# Patient Record
Sex: FEMALE | Race: WHITE | Employment: OTHER | URBAN - METROPOLITAN AREA
[De-identification: names, ages, dates, MRNs, and addresses within clinical notes are randomized per-mention and may not be internally consistent; named-entity substitution may affect disease eponyms.]

---

## 2018-05-08 ENCOUNTER — DOCTOR'S OFFICE (OUTPATIENT)
Dept: URBAN - METROPOLITAN AREA CLINIC 136 | Facility: CLINIC | Age: 78
Setting detail: OPHTHALMOLOGY
End: 2018-05-08
Payer: MEDICARE

## 2018-05-08 ENCOUNTER — RX ONLY (RX ONLY)
Age: 78
End: 2018-05-08

## 2018-05-08 DIAGNOSIS — H43.811: ICD-10-CM

## 2018-05-08 DIAGNOSIS — H35.363: ICD-10-CM

## 2018-05-08 DIAGNOSIS — H00.11: ICD-10-CM

## 2018-05-08 DIAGNOSIS — H25.13: ICD-10-CM

## 2018-05-08 PROCEDURE — 92004 COMPRE OPH EXAM NEW PT 1/>: CPT | Performed by: OPHTHALMOLOGY

## 2018-05-08 PROCEDURE — 92134 CPTRZ OPH DX IMG PST SGM RTA: CPT | Performed by: OPHTHALMOLOGY

## 2018-05-08 ASSESSMENT — CONFRONTATIONAL VISUAL FIELD TEST (CVF)
OS_FINDINGS: FULL
OD_FINDINGS: FULL

## 2018-05-08 ASSESSMENT — LID EXAM ASSESSMENTS
OS_COMMENTS: MGD
OD_COMMENTS: MGD

## 2018-05-09 ASSESSMENT — REFRACTION_MANIFEST
OD_VA2: 20/
OU_VA: 20/
OS_VA1: 20/
OS_VA2: 20/
OU_VA: 20/
OD_VA2: 20/
OD_VA1: 20/
OS_VA1: 20/
OD_VA3: 20/
OS_VA3: 20/
OD_VA1: 20/
OD_VA1: 20/
OS_VA3: 20/
OS_VA2: 20/
OD_VA3: 20/
OS_VA1: 20/
OD_VA2: 20/
OD_VA3: 20/
OS_VA3: 20/
OS_VA2: 20/
OU_VA: 20/

## 2018-05-09 ASSESSMENT — REFRACTION_CURRENTRX
OD_OVR_VA: 20/
OS_OVR_VA: 20/
OS_OVR_VA: 20/
OD_OVR_VA: 20/
OD_OVR_VA: 20/
OS_OVR_VA: 20/

## 2018-05-09 ASSESSMENT — REFRACTION_AUTOREFRACTION
OD_CYLINDER: -0.50
OD_AXIS: 73
OD_SPHERE: +1.00
OS_CYLINDER: -0.50
OS_SPHERE: +0.50
OS_AXIS: 163

## 2018-05-09 ASSESSMENT — VISUAL ACUITY
OS_BCVA: 20/30-1
OD_BCVA: 20/30-2

## 2018-05-09 ASSESSMENT — SPHEQUIV_DERIVED
OD_SPHEQUIV: 0.75
OS_SPHEQUIV: 0.25

## 2019-01-28 ENCOUNTER — ANNUAL EXAM (OUTPATIENT)
Dept: OBGYN CLINIC | Facility: CLINIC | Age: 79
End: 2019-01-28
Payer: COMMERCIAL

## 2019-01-28 VITALS
BODY MASS INDEX: 21.34 KG/M2 | SYSTOLIC BLOOD PRESSURE: 130 MMHG | WEIGHT: 125 LBS | HEIGHT: 64 IN | DIASTOLIC BLOOD PRESSURE: 92 MMHG

## 2019-01-28 DIAGNOSIS — N39.46 MIXED INCONTINENCE: ICD-10-CM

## 2019-01-28 DIAGNOSIS — M85.80 OSTEOPENIA, UNSPECIFIED LOCATION: ICD-10-CM

## 2019-01-28 DIAGNOSIS — Z12.31 ENCOUNTER FOR SCREENING MAMMOGRAM FOR BREAST CANCER: ICD-10-CM

## 2019-01-28 DIAGNOSIS — Z01.419 ENCOUNTER FOR GYNECOLOGICAL EXAMINATION (GENERAL) (ROUTINE) WITHOUT ABNORMAL FINDINGS: Primary | ICD-10-CM

## 2019-01-28 DIAGNOSIS — Z78.0 MENOPAUSE: ICD-10-CM

## 2019-01-28 DIAGNOSIS — N95.2 VAGINAL ATROPHY: ICD-10-CM

## 2019-01-28 PROCEDURE — G0101 CA SCREEN;PELVIC/BREAST EXAM: HCPCS | Performed by: OBSTETRICS & GYNECOLOGY

## 2019-01-28 PROCEDURE — G0145 SCR C/V CYTO,THINLAYER,RESCR: HCPCS | Performed by: OBSTETRICS & GYNECOLOGY

## 2019-01-28 RX ORDER — LISINOPRIL 20 MG/1
40 TABLET ORAL DAILY
Refills: 3 | COMMUNITY
Start: 2018-12-12 | End: 2020-05-07

## 2019-01-28 NOTE — PATIENT INSTRUCTIONS
Vaginal Atrophy   AMBULATORY CARE:   Vaginal atrophy  is a condition that causes thinning, drying, and inflammation of vaginal tissue  This condition is caused by decreased levels of estrogen (a female sex hormone)  Vaginal atrophy can increase your risk for vaginal and urinary tract infections  Vaginal atrophy can worsen over time if not treated  Common signs and symptoms include the following:   · Vaginal dryness, itching, and burning    · Vaginal discharge    · Pain or discomfort during sex    · Light bleeding after sex    · Burning during urination    · Frequent, sudden, strong urges to urinate    · Urinary incontinence (loss of control of your bladder)  Contact your healthcare provider if:   · You have a foul-smelling odor coming from your vagina  · You have a thick, cheese-like discharge from your vagina  · You have itching, swelling, or redness in your vagina  · You have pain or burning when you urinate  · Your urine smells bad  · Your symptoms do not improve, or they get worse  · You have questions or concerns about your condition or care  Treatment:   · Over-the counter vaginal moisturizers  (REPLENS) can help reduce dryness  Your healthcare provider may recommend that you use a vaginal moisturizer several times each week and during sex  Only use creams that are made for vaginal use  Do  not  use petroleum jelly  Lubricants can be used during sex to decrease pain and discomfort  · Estrogen (PREMARIN, ESTRACE, VAGIFEM) may help decrease dryness  It may also lower your risk of vaginal infections if you are going through menopause  It can also help to relieve urinary symptoms  Estrogen may be prescribed in the form of a cream, tablet, or ring  These medicines can be applied or inserted into the vagina  Estrogen can also be prescribed in the form of a pill    Follow up with your healthcare provider as directed:  Write down your questions so you remember to ask them during your visits  © 2017 [facility] Information is for End User's use only and may not be sold, redistributed or otherwise used for commercial purposes  All illustrations and images included in CareNotes® are the copyrighted property of A D A M , Inc  or Timo Eliozndo  The above information is an  only  It is not intended as medical advice for individual conditions or treatments  Talk to your doctor, nurse or pharmacist before following any medical regimen to see if it is safe and effective for you  Kegel Exercises for Women   AMBULATORY CARE:   Kegel exercises  help strengthen your pelvic muscles  Pelvic muscles hold your pelvic organs, such as your bladder and uterus, in place  Kegel exercises help prevent or control problems with urine incontinence (leakage)  Incontinence may be caused by pregnancy, childbirth, or menopause  Contact your healthcare provider if:   · You cannot feel your muscles tighten or relax  · You continue to leak urine  · You have questions or concerns about your condition or care  Use the correct muscles:  Pelvic muscles are the muscles you use to control urine flow  To target these muscles, stop and start the flow of urine several times  This will help you become familiar with how it feels to tighten and relax these muscles  How to do Kegel exercises:   · Empty your bladder  You may lie down, stand up, or sit down to do these exercises  When you first try to do these exercises, it may be easier if you lie down  Tighten or squeeze your pelvic muscles slowly  It may feel like you are trying to hold back urine or gas  Hold this position for 3 seconds  Relax for 3 seconds  Repeat this cycle 10 times  · Do 10 sets of Kegel exercises, at least 3 times a day  Do not hold your breath when you do Kegel exercises  Keep your stomach, back, and leg muscles relaxed  · As your muscles get stronger, you will be able to hold the squeeze longer   Your healthcare provider may ask that you increase your pelvic muscle squeeze to 10 seconds  After you squeeze for 10 seconds, relax for 10 seconds  What else you should know:   · Once you know how to do Kegel exercises, use different positions  You can do these exercises while you lie on the floor, sit at your desk or watch TV, and while you stand  · You may notice improved bladder control within about 6 weeks  · Tighten your pelvic muscles before you sneeze, cough, or lift to prevent urine leakage  Follow up with your healthcare provider as directed:  Write down your questions so you remember to ask them during your visits  © 2017 [facility] [facility] Information is for End User's use only and may not be sold, redistributed or otherwise used for commercial purposes  All illustrations and images included in CareNotes® are the copyrighted property of [employer]  or Timo Elizondo  The above information is an  only  It is not intended as medical advice for individual conditions or treatments  Talk to your doctor, nurse or pharmacist before following any medical regimen to see if it is safe and effective for you

## 2019-01-28 NOTE — PROGRESS NOTES
Assessment        Diagnoses and all orders for this visit:    Encounter for gynecological examination (general) (routine) without abnormal findings    Encounter for screening mammogram for breast cancer  -     Mammo screening bilateral w cad; Future    Menopause  -     DXA bone density spine hip and pelvis; Future    Osteopenia, unspecified location  -     DXA bone density spine hip and pelvis; Future    Vaginal atrophy  -     conjugated estrogens (PREMARIN) vaginal cream; Insert 0 5 g into the vagina 2 (two) times a week    Other orders  -     lisinopril (ZESTRIL) 20 mg tablet; Take 20 mg by mouth daily                  Plan      Breast self exam technique reviewed and patient encouraged to perform self-exam monthly  Educational material distributed  Follow up in 2 years  Follow up as needed  Mammogram   Pap smear  Discussed with patient mixed incontinence  Discussed with patient continuation of Kegel's exercises as well as bladder diet as well as bladder training  She does have vaginal atrophy and I offered her topical estrogen for now  She would like to continue using vaginal moisturizers more consistently and she will then try a small amount of Premarin cream as needed for vaginal atrophy  Discussed with patient also referral to urogynecologist should these measures not help her  Subjective      Waldo Beau is a 70 y o  female who presents for annual exam     Pt complaining of leaking urine, mainly in am after coffee  She has no leaking with laughing or sneezing but does have leaking with lifting heavy  Pt wears panty liner  She goes to the bathroom about 6 times a night  She has no nocturia  She drinks 2 cups of coffee in the am     She does no Kegels' exercises        Last PAP: 2017  Last Mammo: 2018  Colonoscopy:  Due in 3 years  DEXA: none in the past 2 years    Current contraception: tubal ligation        Menstrual History:  OB History      Para Term  AB Living    2 2 2 0 0 2    SAB TAB Ectopic Multiple Live Births    0 0 0 0 2           No LMP recorded  Patient is postmenopausal          Past Medical History:   Diagnosis Date    Osteoarthritis      Past Surgical History:   Procedure Laterality Date    CHOLECYSTECTOMY LAPAROSCOPIC      HIP SURGERY      LAPAROSCOPIC CHOLECYSTECTOMY      LAPAROSCOPY      TUBAL LIGATION       Social History     Social History    Marital status: /Civil Union     Spouse name: N/A    Number of children: N/A    Years of education: N/A     Occupational History    Not on file  Social History Main Topics    Smoking status: Former Smoker    Smokeless tobacco: Never Used    Alcohol use Yes    Drug use: No    Sexual activity: Yes     Birth control/ protection: Female Sterilization     Other Topics Concern    Not on file     Social History Narrative    No narrative on file         Current Outpatient Prescriptions:     conjugated estrogens (PREMARIN) vaginal cream, Insert 0 5 g into the vagina 2 (two) times a week, Disp: 30 g, Rfl: 6    lisinopril (ZESTRIL) 20 mg tablet, Take 20 mg by mouth daily, Disp: , Rfl: 3    Allergies no known allergies      The following portions of the patient's history were reviewed and updated as appropriate: allergies, current medications, past family history, past medical history, past social history, past surgical history and problem list         Review of Systems   Constitutional: Negative  HENT: Negative  Eyes: Negative  Respiratory: Negative  Cardiovascular: Negative  Gastrointestinal: Negative  Endocrine: Negative  Genitourinary:        As noted in HPI   Musculoskeletal: Negative  Skin: Negative  Allergic/Immunologic: Negative  Neurological: Negative  Hematological: Negative  Psychiatric/Behavioral: Negative           Vitals:    01/28/19 1012   BP: 130/92     Weight (last 2 days)     Date/Time   Weight    01/28/19 1012  56 7 (125)            Body mass index is 21 46 kg/m²  Physical Exam   Constitutional: She is oriented to person, place, and time  She appears well-developed and well-nourished  Genitourinary: There is no rash or lesion on the right labia  There is no rash or lesion on the left labia  No bleeding in the vagina  No vaginal discharge found  Right adnexum does not display mass, does not display tenderness and does not display fullness  Left adnexum does not display mass, does not display tenderness and does not display fullness  Cervix does not exhibit motion tenderness, lesion or polyp  Uterus is not enlarged or tender  HENT:   Head: Normocephalic  Neck: No thyromegaly present  Cardiovascular: Normal rate, regular rhythm and normal heart sounds  No murmur heard  Pulmonary/Chest: Effort normal and breath sounds normal  No respiratory distress  She has no wheezes  She has no rales  Right breast exhibits no mass, no nipple discharge, no skin change and no tenderness  Left breast exhibits no mass, no nipple discharge, no skin change and no tenderness  Abdominal: Soft  She exhibits no distension and no mass  There is no tenderness  There is no rebound and no guarding  Musculoskeletal: She exhibits no edema or tenderness  Neurological: She is alert and oriented to person, place, and time  Skin: Skin is warm and dry  Psychiatric: She has a normal mood and affect       Severe vaginal atrophy cervical stenosis

## 2019-01-31 ENCOUNTER — TELEPHONE (OUTPATIENT)
Dept: OBGYN CLINIC | Facility: CLINIC | Age: 79
End: 2019-01-31

## 2019-01-31 LAB
LAB AP GYN PRIMARY INTERPRETATION: NORMAL
Lab: NORMAL

## 2019-01-31 NOTE — TELEPHONE ENCOUNTER
----- Message from Johanna Mejia MD sent at 1/31/2019  2:44 PM EST -----  Please call patient and notify her that her PAP smear was normal

## 2019-03-12 ENCOUNTER — RX ONLY (RX ONLY)
Age: 79
End: 2019-03-12

## 2019-03-12 ENCOUNTER — DOCTOR'S OFFICE (OUTPATIENT)
Dept: URBAN - METROPOLITAN AREA CLINIC 136 | Facility: CLINIC | Age: 79
Setting detail: OPHTHALMOLOGY
End: 2019-03-12
Payer: MEDICARE

## 2019-03-12 DIAGNOSIS — H25.13: ICD-10-CM

## 2019-03-12 DIAGNOSIS — H35.363: ICD-10-CM

## 2019-03-12 DIAGNOSIS — H43.811: ICD-10-CM

## 2019-03-12 DIAGNOSIS — H00.11: ICD-10-CM

## 2019-03-12 PROCEDURE — 92134 CPTRZ OPH DX IMG PST SGM RTA: CPT | Performed by: OPHTHALMOLOGY

## 2019-03-12 PROCEDURE — 92014 COMPRE OPH EXAM EST PT 1/>: CPT | Performed by: OPHTHALMOLOGY

## 2019-03-12 ASSESSMENT — REFRACTION_MANIFEST
OS_VA2: 20/
OD_VA1: 20/
OS_VA1: 20/
OD_VA3: 20/
OS_VA1: 20/
OD_VA3: 20/
OU_VA: 20/
OD_VA2: 20/
OD_VA2: 20/
OS_VA2: 20/
OU_VA: 20/
OD_VA1: 20/
OS_VA3: 20/
OS_VA3: 20/

## 2019-03-12 ASSESSMENT — REFRACTION_AUTOREFRACTION
OS_CYLINDER: -0.50
OD_CYLINDER: -0.50
OS_SPHERE: +0.50
OD_SPHERE: +1.00
OD_AXIS: 73
OS_AXIS: 163

## 2019-03-12 ASSESSMENT — REFRACTION_CURRENTRX
OD_OVR_VA: 20/
OS_OVR_VA: 20/
OD_OVR_VA: 20/
OS_OVR_VA: 20/
OS_OVR_VA: 20/
OD_OVR_VA: 20/

## 2019-03-12 ASSESSMENT — LID EXAM ASSESSMENTS
OD_COMMENTS: MGD
OS_COMMENTS: MGD

## 2019-03-12 ASSESSMENT — SPHEQUIV_DERIVED
OD_SPHEQUIV: 0.75
OS_SPHEQUIV: 0.25

## 2019-03-12 ASSESSMENT — VISUAL ACUITY
OD_BCVA: 20/30-2
OS_BCVA: 20/50-1

## 2019-03-12 ASSESSMENT — CONFRONTATIONAL VISUAL FIELD TEST (CVF)
OD_FINDINGS: FULL
OS_FINDINGS: FULL

## 2019-03-13 PROBLEM — H35.363 DRUSEN; BOTH EYES: Status: ACTIVE | Noted: 2018-05-08

## 2019-03-13 PROBLEM — H43.811 POST VITREOUS DETACHMENT; RIGHT EYE: Status: ACTIVE | Noted: 2018-05-08

## 2019-03-13 PROBLEM — H25.13 CATARACT NUCLEAR SCLEROSIS ; BOTH EYES: Status: ACTIVE | Noted: 2018-05-08

## 2019-03-13 PROBLEM — H00.11 CHALAZION; RIGHT UPPER LID: Status: ACTIVE | Noted: 2018-05-08

## 2019-04-26 ENCOUNTER — APPOINTMENT (OUTPATIENT)
Dept: RADIOLOGY | Facility: CLINIC | Age: 79
End: 2019-04-26
Payer: COMMERCIAL

## 2019-04-26 ENCOUNTER — OFFICE VISIT (OUTPATIENT)
Dept: OBGYN CLINIC | Facility: CLINIC | Age: 79
End: 2019-04-26
Payer: COMMERCIAL

## 2019-04-26 VITALS
HEIGHT: 65 IN | BODY MASS INDEX: 20.33 KG/M2 | SYSTOLIC BLOOD PRESSURE: 141 MMHG | WEIGHT: 122 LBS | DIASTOLIC BLOOD PRESSURE: 90 MMHG | HEART RATE: 81 BPM

## 2019-04-26 DIAGNOSIS — M25.511 RIGHT SHOULDER PAIN, UNSPECIFIED CHRONICITY: ICD-10-CM

## 2019-04-26 DIAGNOSIS — M75.101 RIGHT ROTATOR CUFF TEAR ARTHROPATHY: Primary | ICD-10-CM

## 2019-04-26 DIAGNOSIS — M12.811 RIGHT ROTATOR CUFF TEAR ARTHROPATHY: Primary | ICD-10-CM

## 2019-04-26 PROCEDURE — 73030 X-RAY EXAM OF SHOULDER: CPT

## 2019-04-26 PROCEDURE — 20610 DRAIN/INJ JOINT/BURSA W/O US: CPT | Performed by: ORTHOPAEDIC SURGERY

## 2019-04-26 PROCEDURE — 99203 OFFICE O/P NEW LOW 30 MIN: CPT | Performed by: ORTHOPAEDIC SURGERY

## 2019-04-26 RX ORDER — METHYLPREDNISOLONE ACETATE 40 MG/ML
1 INJECTION, SUSPENSION INTRA-ARTICULAR; INTRALESIONAL; INTRAMUSCULAR; SOFT TISSUE
Status: COMPLETED | OUTPATIENT
Start: 2019-04-26 | End: 2019-04-26

## 2019-04-26 RX ORDER — LIDOCAINE HYDROCHLORIDE 10 MG/ML
3 INJECTION, SOLUTION EPIDURAL; INFILTRATION; INTRACAUDAL; PERINEURAL
Status: COMPLETED | OUTPATIENT
Start: 2019-04-26 | End: 2019-04-26

## 2019-04-26 RX ADMIN — LIDOCAINE HYDROCHLORIDE 3 ML: 10 INJECTION, SOLUTION EPIDURAL; INFILTRATION; INTRACAUDAL; PERINEURAL at 11:05

## 2019-04-26 RX ADMIN — METHYLPREDNISOLONE ACETATE 1 ML: 40 INJECTION, SUSPENSION INTRA-ARTICULAR; INTRALESIONAL; INTRAMUSCULAR; SOFT TISSUE at 11:05

## 2019-04-29 ENCOUNTER — EVALUATION (OUTPATIENT)
Dept: PHYSICAL THERAPY | Facility: CLINIC | Age: 79
End: 2019-04-29
Payer: COMMERCIAL

## 2019-04-29 DIAGNOSIS — M75.101 RIGHT ROTATOR CUFF TEAR ARTHROPATHY: Primary | ICD-10-CM

## 2019-04-29 DIAGNOSIS — M12.811 RIGHT ROTATOR CUFF TEAR ARTHROPATHY: Primary | ICD-10-CM

## 2019-04-29 PROCEDURE — 97161 PT EVAL LOW COMPLEX 20 MIN: CPT | Performed by: PHYSICAL THERAPIST

## 2019-04-29 PROCEDURE — 97112 NEUROMUSCULAR REEDUCATION: CPT | Performed by: PHYSICAL THERAPIST

## 2019-05-01 ENCOUNTER — OFFICE VISIT (OUTPATIENT)
Dept: PHYSICAL THERAPY | Facility: CLINIC | Age: 79
End: 2019-05-01
Payer: COMMERCIAL

## 2019-05-01 DIAGNOSIS — M12.811 RIGHT ROTATOR CUFF TEAR ARTHROPATHY: Primary | ICD-10-CM

## 2019-05-01 DIAGNOSIS — M75.101 RIGHT ROTATOR CUFF TEAR ARTHROPATHY: Primary | ICD-10-CM

## 2019-05-01 PROCEDURE — 97110 THERAPEUTIC EXERCISES: CPT

## 2019-05-01 PROCEDURE — 97112 NEUROMUSCULAR REEDUCATION: CPT

## 2019-05-06 ENCOUNTER — OFFICE VISIT (OUTPATIENT)
Dept: PHYSICAL THERAPY | Facility: CLINIC | Age: 79
End: 2019-05-06
Payer: COMMERCIAL

## 2019-05-06 DIAGNOSIS — M75.101 RIGHT ROTATOR CUFF TEAR ARTHROPATHY: Primary | ICD-10-CM

## 2019-05-06 DIAGNOSIS — M12.811 RIGHT ROTATOR CUFF TEAR ARTHROPATHY: Primary | ICD-10-CM

## 2019-05-06 PROCEDURE — 97112 NEUROMUSCULAR REEDUCATION: CPT

## 2019-05-06 PROCEDURE — 97110 THERAPEUTIC EXERCISES: CPT

## 2019-05-09 ENCOUNTER — OFFICE VISIT (OUTPATIENT)
Dept: PHYSICAL THERAPY | Facility: CLINIC | Age: 79
End: 2019-05-09
Payer: COMMERCIAL

## 2019-05-09 DIAGNOSIS — M12.811 RIGHT ROTATOR CUFF TEAR ARTHROPATHY: Primary | ICD-10-CM

## 2019-05-09 DIAGNOSIS — M75.101 RIGHT ROTATOR CUFF TEAR ARTHROPATHY: Primary | ICD-10-CM

## 2019-05-09 PROCEDURE — 97112 NEUROMUSCULAR REEDUCATION: CPT

## 2019-05-13 ENCOUNTER — OFFICE VISIT (OUTPATIENT)
Dept: PHYSICAL THERAPY | Facility: CLINIC | Age: 79
End: 2019-05-13
Payer: COMMERCIAL

## 2019-05-13 DIAGNOSIS — M75.101 RIGHT ROTATOR CUFF TEAR ARTHROPATHY: Primary | ICD-10-CM

## 2019-05-13 DIAGNOSIS — M12.811 RIGHT ROTATOR CUFF TEAR ARTHROPATHY: Primary | ICD-10-CM

## 2019-05-13 PROCEDURE — 97112 NEUROMUSCULAR REEDUCATION: CPT

## 2019-05-13 PROCEDURE — 97110 THERAPEUTIC EXERCISES: CPT

## 2019-05-16 ENCOUNTER — OFFICE VISIT (OUTPATIENT)
Dept: PHYSICAL THERAPY | Facility: CLINIC | Age: 79
End: 2019-05-16
Payer: COMMERCIAL

## 2019-05-16 DIAGNOSIS — M12.811 RIGHT ROTATOR CUFF TEAR ARTHROPATHY: Primary | ICD-10-CM

## 2019-05-16 DIAGNOSIS — M75.101 RIGHT ROTATOR CUFF TEAR ARTHROPATHY: Primary | ICD-10-CM

## 2019-05-16 PROCEDURE — 97112 NEUROMUSCULAR REEDUCATION: CPT | Performed by: PHYSICAL THERAPIST

## 2019-05-16 PROCEDURE — 97110 THERAPEUTIC EXERCISES: CPT | Performed by: PHYSICAL THERAPIST

## 2019-05-20 ENCOUNTER — EVALUATION (OUTPATIENT)
Dept: PHYSICAL THERAPY | Facility: CLINIC | Age: 79
End: 2019-05-20
Payer: COMMERCIAL

## 2019-05-20 DIAGNOSIS — M75.101 RIGHT ROTATOR CUFF TEAR ARTHROPATHY: Primary | ICD-10-CM

## 2019-05-20 DIAGNOSIS — M12.811 RIGHT ROTATOR CUFF TEAR ARTHROPATHY: Primary | ICD-10-CM

## 2019-05-20 PROCEDURE — 97110 THERAPEUTIC EXERCISES: CPT

## 2019-05-20 PROCEDURE — 97112 NEUROMUSCULAR REEDUCATION: CPT

## 2019-06-14 ENCOUNTER — OFFICE VISIT (OUTPATIENT)
Dept: OBGYN CLINIC | Facility: CLINIC | Age: 79
End: 2019-06-14
Payer: COMMERCIAL

## 2019-06-14 VITALS
DIASTOLIC BLOOD PRESSURE: 80 MMHG | HEIGHT: 65 IN | HEART RATE: 79 BPM | SYSTOLIC BLOOD PRESSURE: 136 MMHG | BODY MASS INDEX: 20.66 KG/M2 | WEIGHT: 124 LBS

## 2019-06-14 DIAGNOSIS — M75.101 RIGHT ROTATOR CUFF TEAR ARTHROPATHY: Primary | ICD-10-CM

## 2019-06-14 DIAGNOSIS — M25.511 CHRONIC RIGHT SHOULDER PAIN: ICD-10-CM

## 2019-06-14 DIAGNOSIS — M12.811 RIGHT ROTATOR CUFF TEAR ARTHROPATHY: Primary | ICD-10-CM

## 2019-06-14 DIAGNOSIS — G89.29 CHRONIC RIGHT SHOULDER PAIN: ICD-10-CM

## 2019-06-14 PROCEDURE — 99213 OFFICE O/P EST LOW 20 MIN: CPT | Performed by: ORTHOPAEDIC SURGERY

## 2019-06-14 RX ORDER — DOXYCYCLINE HYCLATE 100 MG/1
100 TABLET, DELAYED RELEASE ORAL WEEKLY
COMMUNITY
End: 2021-02-23 | Stop reason: ALTCHOICE

## 2019-11-15 ENCOUNTER — OFFICE VISIT (OUTPATIENT)
Dept: OBGYN CLINIC | Facility: CLINIC | Age: 79
End: 2019-11-15
Payer: COMMERCIAL

## 2019-11-15 VITALS
DIASTOLIC BLOOD PRESSURE: 84 MMHG | WEIGHT: 123 LBS | HEART RATE: 81 BPM | HEIGHT: 66 IN | BODY MASS INDEX: 19.77 KG/M2 | SYSTOLIC BLOOD PRESSURE: 151 MMHG

## 2019-11-15 DIAGNOSIS — M19.011 PRIMARY OSTEOARTHRITIS OF RIGHT SHOULDER: Primary | ICD-10-CM

## 2019-11-15 PROCEDURE — 20610 DRAIN/INJ JOINT/BURSA W/O US: CPT | Performed by: ORTHOPAEDIC SURGERY

## 2019-11-15 PROCEDURE — 99213 OFFICE O/P EST LOW 20 MIN: CPT | Performed by: ORTHOPAEDIC SURGERY

## 2019-11-15 RX ORDER — LIDOCAINE HYDROCHLORIDE 10 MG/ML
3 INJECTION, SOLUTION INFILTRATION; PERINEURAL
Status: COMPLETED | OUTPATIENT
Start: 2019-11-15 | End: 2019-11-15

## 2019-11-15 RX ORDER — LISINOPRIL 40 MG/1
40 TABLET ORAL DAILY
Refills: 1 | COMMUNITY
Start: 2019-09-20 | End: 2020-12-18 | Stop reason: SDUPTHER

## 2019-11-15 RX ORDER — METHYLPREDNISOLONE ACETATE 40 MG/ML
1 INJECTION, SUSPENSION INTRA-ARTICULAR; INTRALESIONAL; INTRAMUSCULAR; SOFT TISSUE
Status: COMPLETED | OUTPATIENT
Start: 2019-11-15 | End: 2019-11-15

## 2019-11-15 RX ADMIN — LIDOCAINE HYDROCHLORIDE 3 ML: 10 INJECTION, SOLUTION INFILTRATION; PERINEURAL at 10:14

## 2019-11-15 RX ADMIN — METHYLPREDNISOLONE ACETATE 1 ML: 40 INJECTION, SUSPENSION INTRA-ARTICULAR; INTRALESIONAL; INTRAMUSCULAR; SOFT TISSUE at 10:14

## 2019-11-15 NOTE — PROGRESS NOTES
Ortho Sports Medicine Shoulder Follow Up Visit     Assesment:   70year old female with right shoulder rotator cuff arthropathy  Repeat cortisone injection done today    Plan:    Conservative treatment:    Ice to shoulder 1-2 times daily, for 20 minutes at a time  Home exercise program for shoulder, including ROM and strenthening  Instructions given to patient of what exercises to perform  Imaging: All imaging from today was reviewed by myself and explained to the patient  Injection:    The risks and benefits of the injection (which include but are not limited to: infection, bleeding,damage to nerve/artery, need for further intervention), as well as the risks and benefits of all alternative treatments were explained and understood  The patient elected to proceed with injection  The procedure was done with aseptic technique, and the patient tolerated the procedure well with no complications  A corticosteroid injection of the subacromial space was performed  The patient should take 1-2 days off of activity, with gradual return to activity as tolerated  Ice to the shoulder 1-2 times daily for 20 minutes, for next 24-48 hrs  Surgery:     No surgery is recommended at this point, continue with conservative treatment plan as noted  Possible referral for to a joint replacement specialist for reverse total shoulder in the future if conservative treatments fail  Follow up:    Return if symptoms worsen or fail to improve  Chief Complaint   Patient presents with    Right Shoulder - Follow-up     History of Present Illness: The patient is returns for follow up of right shoulder  Since the prior visit, She reports significant improvement  Pain is improved by rest, ice, NSAIDS, physical therapy and injection  Pain is aggravated by overhead activity  Symptoms include clicking and catching  The patient denies weakness         The patient has tried rest, ice, NSAIDS, physical therapy and injection  I have reviewed the past medical, surgical, social and family history, medications and allergies as documented in the EMR  Review of systems: ROS is negative other than that noted in the HPI  Constitutional: Negative for fatigue and fever  Physical Exam:    Blood pressure 151/84, pulse 81, height 5' 5 5" (1 664 m), weight 55 8 kg (123 lb)      General/Constitutional: NAD, well developed, well nourished  HENT: Normocephalic, atraumatic  CV: Intact distal pulses, regular rate  Resp: No respiratory distress or labored breathing  Lymphatic: No lymphadenopathy palpated  Neuro: Alert and Oriented x 3, no focal deficits  Psych: Normal mood, normal affect, normal judgement, normal behavior  Skin: Warm, dry, no rashes, no erythema      Shoulder focused exam:       RIGHT LEFT    Scapula Atrophy Negative Negative     Winging Negative Negative     Protraction Negative Negative    Rotator cuff SS 5/5 5/5     IS 5/5 5/5     SubS 5/5 5/5    ROM     170     ER0 60 60     ER90 90    90     IR90 T6    T6     IRb T6    T6    TTP: AC Negative Negative     Biceps Negative Negative     Coracoid Negative Negative    Special Tests: O'Briens Positive Negative     Brandt-shear Negative Negative     Cross body Adduction Negative Negative     Speeds  Negative Negative     Db's Negative Negative     Whipple Positive Negative       Neer Negative Negative     Combs Negative Negative    Instability: Apprehension & relocation not tested not tested     Load & shift not tested not tested    Other: Crank Negative Negative             Large joint arthrocentesis: R subacromial bursa  Date/Time: 11/15/2019 10:14 AM  Consent given by: patient and parent  Site marked: site marked  Timeout: Immediately prior to procedure a time out was called to verify the correct patient, procedure, equipment, support staff and site/side marked as required   Supporting Documentation  Indications: pain and joint swelling   Procedure Details  Location: shoulder - R subacromial bursa  Needle size: 22 G  Ultrasound guidance: no  Approach: posterolateral  Medications administered: 3 mL lidocaine 1 %; 1 mL methylPREDNISolone acetate 40 mg/mL    Patient tolerance: patient tolerated the procedure well with no immediate complications  Dressing:  Sterile dressing applied        UE NV Exam: +2 Radial pulses bilaterally  Sensation intact to light touch C5-T1 bilaterally, Radial/median/ulnar nerve motor intact    Cervical ROM is full without pain, numbness or tingling    Shoulder Imaging    Xrays of the RIGHT shoulder from the prior visit were reviewed again with the patient today    Scribe Attestation    I,:   Kieran Ugarte am acting as a scribe while in the presence of the attending physician :        I,:   Ryan Reyes, DO personally performed the services described in this documentation    as scribed in my presence :

## 2020-05-04 ENCOUNTER — HOSPITAL ENCOUNTER (OUTPATIENT)
Facility: HOSPITAL | Age: 80
Setting detail: SURGERY ADMIT
End: 2020-05-04
Attending: ORTHOPAEDIC SURGERY | Admitting: ORTHOPAEDIC SURGERY
Payer: COMMERCIAL

## 2020-05-06 ENCOUNTER — TRANSCRIBE ORDERS (OUTPATIENT)
Dept: ADMINISTRATIVE | Facility: HOSPITAL | Age: 80
End: 2020-05-06

## 2020-05-06 ENCOUNTER — HOSPITAL ENCOUNTER (OUTPATIENT)
Dept: RADIOLOGY | Facility: HOSPITAL | Age: 80
Discharge: HOME | End: 2020-05-06
Payer: COMMERCIAL

## 2020-05-06 ENCOUNTER — APPOINTMENT (OUTPATIENT)
Dept: LAB | Facility: HOSPITAL | Age: 80
End: 2020-05-06
Payer: COMMERCIAL

## 2020-05-06 DIAGNOSIS — M19.011 ARTHRITIS OF RIGHT SHOULDER REGION: ICD-10-CM

## 2020-05-06 DIAGNOSIS — M25.511 RIGHT SHOULDER PAIN, UNSPECIFIED CHRONICITY: ICD-10-CM

## 2020-05-06 DIAGNOSIS — M25.511 RIGHT SHOULDER PAIN, UNSPECIFIED CHRONICITY: Primary | ICD-10-CM

## 2020-05-06 LAB
ALBUMIN SERPL BCP-MCNC: 4 G/DL (ref 3.5–5)
ALP SERPL-CCNC: 78 U/L (ref 46–116)
ALT SERPL W P-5'-P-CCNC: 26 U/L (ref 12–78)
ANION GAP SERPL CALCULATED.3IONS-SCNC: 9 MMOL/L (ref 4–13)
APTT PPP: 26 SECONDS (ref 23–37)
AST SERPL W P-5'-P-CCNC: 19 U/L (ref 5–45)
BACTERIA UR QL AUTO: ABNORMAL /HPF
BASOPHILS # BLD AUTO: 0.03 THOUSANDS/ΜL (ref 0–0.1)
BASOPHILS NFR BLD AUTO: 1 % (ref 0–1)
BILIRUB SERPL-MCNC: 0.48 MG/DL (ref 0.2–1)
BILIRUB UR QL STRIP: NEGATIVE
BUN SERPL-MCNC: 18 MG/DL (ref 5–25)
CALCIUM SERPL-MCNC: 9.3 MG/DL (ref 8.3–10.1)
CHLORIDE SERPL-SCNC: 100 MMOL/L (ref 100–108)
CLARITY UR: CLEAR
CO2 SERPL-SCNC: 26 MMOL/L (ref 21–32)
COLOR UR: YELLOW
CREAT SERPL-MCNC: 0.54 MG/DL (ref 0.6–1.3)
CRP SERPL QL: <3 MG/L
EOSINOPHIL # BLD AUTO: 0.11 THOUSAND/ΜL (ref 0–0.61)
EOSINOPHIL NFR BLD AUTO: 2 % (ref 0–6)
ERYTHROCYTE [DISTWIDTH] IN BLOOD BY AUTOMATED COUNT: 12.4 % (ref 11.6–15.1)
EST. AVERAGE GLUCOSE BLD GHB EST-MCNC: 105 MG/DL
FERRITIN SERPL-MCNC: 61 NG/ML (ref 8–388)
GFR SERPL CREATININE-BSD FRML MDRD: 91 ML/MIN/1.73SQ M
GLUCOSE P FAST SERPL-MCNC: 95 MG/DL (ref 65–99)
GLUCOSE UR STRIP-MCNC: NEGATIVE MG/DL
HBA1C MFR BLD: 5.3 %
HCT VFR BLD AUTO: 40.9 % (ref 34.8–46.1)
HGB BLD-MCNC: 13.6 G/DL (ref 11.5–15.4)
HGB UR QL STRIP.AUTO: ABNORMAL
HYALINE CASTS #/AREA URNS LPF: ABNORMAL /LPF
IMM GRANULOCYTES # BLD AUTO: 0.01 THOUSAND/UL (ref 0–0.2)
IMM GRANULOCYTES NFR BLD AUTO: 0 % (ref 0–2)
INR PPP: 0.94 (ref 0.84–1.19)
IRON SATN MFR SERPL: 21 %
IRON SERPL-MCNC: 85 UG/DL (ref 50–170)
KETONES UR STRIP-MCNC: NEGATIVE MG/DL
LEUKOCYTE ESTERASE UR QL STRIP: ABNORMAL
LYMPHOCYTES # BLD AUTO: 1.54 THOUSANDS/ΜL (ref 0.6–4.47)
LYMPHOCYTES NFR BLD AUTO: 34 % (ref 14–44)
MCH RBC QN AUTO: 31.4 PG (ref 26.8–34.3)
MCHC RBC AUTO-ENTMCNC: 33.3 G/DL (ref 31.4–37.4)
MCV RBC AUTO: 95 FL (ref 82–98)
MONOCYTES # BLD AUTO: 0.5 THOUSAND/ΜL (ref 0.17–1.22)
MONOCYTES NFR BLD AUTO: 11 % (ref 4–12)
NEUTROPHILS # BLD AUTO: 2.31 THOUSANDS/ΜL (ref 1.85–7.62)
NEUTS SEG NFR BLD AUTO: 52 % (ref 43–75)
NITRITE UR QL STRIP: NEGATIVE
NON-SQ EPI CELLS URNS QL MICRO: ABNORMAL /HPF
NRBC BLD AUTO-RTO: 0 /100 WBCS
PH UR STRIP.AUTO: 6.5 [PH]
PLATELET # BLD AUTO: 347 THOUSANDS/UL (ref 149–390)
PMV BLD AUTO: 9.1 FL (ref 8.9–12.7)
POTASSIUM SERPL-SCNC: 4.2 MMOL/L (ref 3.5–5.3)
PROT SERPL-MCNC: 6.8 G/DL (ref 6.4–8.2)
PROT UR STRIP-MCNC: NEGATIVE MG/DL
PROTHROMBIN TIME: 12.2 SECONDS (ref 11.6–14.5)
RBC # BLD AUTO: 4.33 MILLION/UL (ref 3.81–5.12)
RBC #/AREA URNS AUTO: ABNORMAL /HPF
SODIUM SERPL-SCNC: 135 MMOL/L (ref 136–145)
SP GR UR STRIP.AUTO: 1.02 (ref 1–1.03)
TIBC SERPL-MCNC: 401 UG/DL (ref 250–450)
UROBILINOGEN UR QL STRIP.AUTO: 0.2 E.U./DL
WBC # BLD AUTO: 4.5 THOUSAND/UL (ref 4.31–10.16)
WBC #/AREA URNS AUTO: ABNORMAL /HPF

## 2020-05-06 PROCEDURE — 82728 ASSAY OF FERRITIN: CPT

## 2020-05-06 PROCEDURE — 80053 COMPREHEN METABOLIC PANEL: CPT

## 2020-05-06 PROCEDURE — 83550 IRON BINDING TEST: CPT

## 2020-05-06 PROCEDURE — 81001 URINALYSIS AUTO W/SCOPE: CPT | Performed by: ORTHOPAEDIC SURGERY

## 2020-05-06 PROCEDURE — 85730 THROMBOPLASTIN TIME PARTIAL: CPT

## 2020-05-06 PROCEDURE — 85025 COMPLETE CBC W/AUTO DIFF WBC: CPT

## 2020-05-06 PROCEDURE — 83540 ASSAY OF IRON: CPT

## 2020-05-06 PROCEDURE — 36415 COLL VENOUS BLD VENIPUNCTURE: CPT

## 2020-05-06 PROCEDURE — 71046 X-RAY EXAM CHEST 2 VIEWS: CPT

## 2020-05-06 PROCEDURE — 85610 PROTHROMBIN TIME: CPT

## 2020-05-06 PROCEDURE — 86140 C-REACTIVE PROTEIN: CPT

## 2020-05-06 PROCEDURE — 83036 HEMOGLOBIN GLYCOSYLATED A1C: CPT

## 2020-05-07 VITALS — BODY MASS INDEX: 19.77 KG/M2 | WEIGHT: 123 LBS | HEIGHT: 66 IN

## 2020-05-07 RX ORDER — CHOLECALCIFEROL (VITAMIN D3) 50 MCG
2000 TABLET ORAL DAILY
COMMUNITY

## 2020-05-07 RX ORDER — NAPROXEN SODIUM 220 MG
220 TABLET ORAL
COMMUNITY
End: 2020-08-18

## 2020-05-08 DIAGNOSIS — Z01.810 PREOP CARDIOVASCULAR EXAM: Primary | ICD-10-CM

## 2020-05-13 DIAGNOSIS — Z01.810 PREOP CARDIOVASCULAR EXAM: ICD-10-CM

## 2020-05-13 PROCEDURE — U0003 INFECTIOUS AGENT DETECTION BY NUCLEIC ACID (DNA OR RNA); SEVERE ACUTE RESPIRATORY SYNDROME CORONAVIRUS 2 (SARS-COV-2) (CORONAVIRUS DISEASE [COVID-19]), AMPLIFIED PROBE TECHNIQUE, MAKING USE OF HIGH THROUGHPUT TECHNOLOGIES AS DESCRIBED BY CMS-2020-01-R: HCPCS

## 2020-05-15 ENCOUNTER — APPOINTMENT (OUTPATIENT)
Dept: LAB | Facility: HOSPITAL | Age: 80
End: 2020-05-15
Attending: INTERNAL MEDICINE
Payer: COMMERCIAL

## 2020-05-15 ENCOUNTER — TRANSCRIBE ORDERS (OUTPATIENT)
Dept: ADMINISTRATIVE | Facility: HOSPITAL | Age: 80
End: 2020-05-15

## 2020-05-15 DIAGNOSIS — R31.9 HEMATURIA SYNDROME: Primary | ICD-10-CM

## 2020-05-15 LAB
BACTERIA UR QL AUTO: ABNORMAL /HPF
BILIRUB UR QL STRIP: NEGATIVE
CLARITY UR: ABNORMAL
COLOR UR: YELLOW
GLUCOSE UR STRIP-MCNC: NEGATIVE MG/DL
HGB UR QL STRIP.AUTO: ABNORMAL
HYALINE CASTS #/AREA URNS LPF: ABNORMAL /LPF
KETONES UR STRIP-MCNC: NEGATIVE MG/DL
LEUKOCYTE ESTERASE UR QL STRIP: NEGATIVE
NITRITE UR QL STRIP: NEGATIVE
NON-SQ EPI CELLS URNS QL MICRO: ABNORMAL /HPF
PH UR STRIP.AUTO: 7.5 [PH]
PROT UR STRIP-MCNC: NEGATIVE MG/DL
RBC #/AREA URNS AUTO: ABNORMAL /HPF
SARS-COV-2 RNA SPEC QL NAA+PROBE: NOT DETECTED
SP GR UR STRIP.AUTO: 1.02 (ref 1–1.03)
UROBILINOGEN UR QL STRIP.AUTO: 0.2 E.U./DL
WBC #/AREA URNS AUTO: ABNORMAL /HPF

## 2020-05-15 PROCEDURE — 81001 URINALYSIS AUTO W/SCOPE: CPT | Performed by: INTERNAL MEDICINE

## 2020-05-18 ENCOUNTER — CLINICAL SUPPORT (OUTPATIENT)
Dept: URGENT CARE | Facility: CLINIC | Age: 80
End: 2020-05-18
Payer: COMMERCIAL

## 2020-05-18 ENCOUNTER — TRANSCRIBE ORDERS (OUTPATIENT)
Dept: URGENT CARE | Facility: CLINIC | Age: 80
End: 2020-05-18

## 2020-05-18 DIAGNOSIS — Z01.810 PREOP CARDIOVASCULAR EXAM: Primary | ICD-10-CM

## 2020-05-18 DIAGNOSIS — M25.511 RIGHT SHOULDER PAIN, UNSPECIFIED CHRONICITY: Primary | ICD-10-CM

## 2020-05-18 LAB
ATRIAL RATE: 87 BPM
P AXIS: -13 DEGREES
PR INTERVAL: 146 MS
QRS AXIS: 5 DEGREES
QRSD INTERVAL: 82 MS
QT INTERVAL: 368 MS
QTC INTERVAL: 442 MS
T WAVE AXIS: 5 DEGREES
VENTRICULAR RATE: 87 BPM

## 2020-05-18 PROCEDURE — 93005 ELECTROCARDIOGRAM TRACING: CPT

## 2020-05-18 PROCEDURE — 93010 ELECTROCARDIOGRAM REPORT: CPT | Performed by: INTERNAL MEDICINE

## 2020-05-19 RX ORDER — SODIUM CHLORIDE 9 MG/ML
125 INJECTION, SOLUTION INTRAVENOUS CONTINUOUS
Status: CANCELLED | OUTPATIENT
Start: 2020-05-19

## 2020-05-19 RX ORDER — FENTANYL CITRATE/PF 50 MCG/ML
25 SYRINGE (ML) INJECTION
Status: CANCELLED | OUTPATIENT
Start: 2020-05-19

## 2020-05-19 RX ORDER — ONDANSETRON 2 MG/ML
4 INJECTION INTRAMUSCULAR; INTRAVENOUS ONCE AS NEEDED
Status: CANCELLED | OUTPATIENT
Start: 2020-05-19

## 2020-05-28 DIAGNOSIS — N95.2 VAGINAL ATROPHY: Primary | ICD-10-CM

## 2020-07-21 DIAGNOSIS — Z01.818 PREOP TESTING: Primary | ICD-10-CM

## 2020-07-27 ENCOUNTER — OFFICE VISIT (OUTPATIENT)
Dept: FAMILY MEDICINE CLINIC | Facility: CLINIC | Age: 80
End: 2020-07-27
Payer: COMMERCIAL

## 2020-07-27 VITALS
HEART RATE: 79 BPM | SYSTOLIC BLOOD PRESSURE: 140 MMHG | WEIGHT: 122.4 LBS | RESPIRATION RATE: 16 BRPM | HEIGHT: 63 IN | BODY MASS INDEX: 21.69 KG/M2 | TEMPERATURE: 97.6 F | DIASTOLIC BLOOD PRESSURE: 78 MMHG

## 2020-07-27 DIAGNOSIS — M25.511 RIGHT SHOULDER PAIN, UNSPECIFIED CHRONICITY: ICD-10-CM

## 2020-07-27 DIAGNOSIS — Z13.820 SCREENING FOR OSTEOPOROSIS: ICD-10-CM

## 2020-07-27 DIAGNOSIS — E04.1 THYROID NODULE: ICD-10-CM

## 2020-07-27 DIAGNOSIS — I10 ESSENTIAL HYPERTENSION: Primary | ICD-10-CM

## 2020-07-27 DIAGNOSIS — Z78.0 POSTMENOPAUSAL: ICD-10-CM

## 2020-07-27 PROCEDURE — 99203 OFFICE O/P NEW LOW 30 MIN: CPT | Performed by: FAMILY MEDICINE

## 2020-07-27 PROCEDURE — 1160F RVW MEDS BY RX/DR IN RCRD: CPT | Performed by: FAMILY MEDICINE

## 2020-07-27 PROCEDURE — 3008F BODY MASS INDEX DOCD: CPT | Performed by: FAMILY MEDICINE

## 2020-07-27 PROCEDURE — 3288F FALL RISK ASSESSMENT DOCD: CPT | Performed by: FAMILY MEDICINE

## 2020-07-27 PROCEDURE — 1036F TOBACCO NON-USER: CPT | Performed by: FAMILY MEDICINE

## 2020-07-27 PROCEDURE — 1101F PT FALLS ASSESS-DOCD LE1/YR: CPT | Performed by: FAMILY MEDICINE

## 2020-07-27 NOTE — PROGRESS NOTES
Assessment/Plan:    1  Essential hypertension  - stable, will continue patient on Lisinopril 40 mg daily  - Lipid Panel with Direct LDL reflex; Future    2  Right shoulder pain  - following with Ortho    3  Thyroid nodule  - due for TSH and thyroid US this fall  - following with Endocrine    4  Microscopic hematuria  - following with Urology    5  Screening for osteoporosis  - will continue calcium/ vitamin D supplements, discussed weight bearing exercises, due for DEXA scan  - DXA bone density spine hip and pelvis; Future    Follow up in 3-4 months or sooner as needed  Will obtain old records  The patient understands and agrees with the treatment plan  Subjective:   Chief Complaint   Patient presents with   [facility] Establish Care    Shoulder Pain     R      Patient ID: Tim Marquez is a 68 y o  female with history of hypertension who presents today for her initial visit as a new patient to be established  Patient is following with Ortho Dr Renetta Shannon at [facility] [place] for right shoulder pain, she was scheduled for shoulder replacement surgery on 05/20/20 which was cancelled due to abnormal labs/ microscopic hematuria  She was seen by [facility] Dr Gerhardt Hedger and had CT abdomen/ pelvis done on 06/25/2020 which was negative  Patient also reports history of thyroid nodules and negative FNA in 2013, she is following with Endocrine Dr Mellisa Cerda at [facility]     She had her last colonoscopy 7 years ago which was negative and advised repeat in 10 years  Her last mammogram in 10/2019 was negative  Patient is UTD on Pneumovax and Prevnar 13, she had her Shingrix #1 4 months ago and is scheduled for her 2nd dose this Thursday         The following portions of the patient's history were reviewed and updated as appropriate: allergies, current medications, past family history, past medical history, past social history, past surgical history and problem list     Past Medical History:   Diagnosis Date    Disease of thyroid gland     thyroid nodules    Dry eye     Hypertension     Osteoarthritis     Wears partial dentures     upper     Past Surgical History:   Procedure Laterality Date    APPENDECTOMY      CHOLECYSTECTOMY LAPAROSCOPIC      HIP SURGERY      JOINT REPLACEMENT Right     hip    LAPAROSCOPIC CHOLECYSTECTOMY      LAPAROSCOPY      TONSILLECTOMY      TUBAL LIGATION      WISDOM TOOTH EXTRACTION       Family History   Problem Relation Age of Onset    Hypertension Mother     Coronary artery disease Father     Hypertension Father     No Known Problems Brother     Alcohol abuse Neg Hx     Substance Abuse Neg Hx     Mental illness Neg Hx      Social History     Socioeconomic History    Marital status: /Civil Union     Spouse name: Not on file    Number of children: Not on file    Years of education: Not on file    Highest education level: Not on file   Occupational History    Not on file   Social Needs    Financial resource strain: Not on file    Food insecurity:     Worry: Not on file     Inability: Not on file    Transportation needs:     Medical: Not on file     Non-medical: Not on file   Tobacco Use    Smoking status: Former Smoker     Last attempt to quit:      Years since quittin 5    Smokeless tobacco: Never Used   Substance and Sexual Activity    Alcohol use: Yes     Frequency: 2-4 times a month     Drinks per session: 1 or 2     Comment:  Occ    Drug use: No    Sexual activity: Yes     Birth control/protection: Female Sterilization   Lifestyle    Physical activity:     Days per week: Not on file     Minutes per session: Not on file    Stress: Not on file   Relationships    Social connections:     Talks on phone: Not on file     Gets together: Not on file     Attends [employer] service: Not on file     Active member of club or organization: Not on file     Attends meetings of clubs or organizations: Not on file     Relationship status: Not on file    Intimate partner violence:     Fear of current or ex partner: Not on file     Emotionally abused: Not on file     Physically abused: Not on file     Forced sexual activity: Not on file   Other Topics Concern    Not on file   Social History Narrative    Not on file       Current Outpatient Medications:     Cholecalciferol (VITAMIN D) 50 MCG (2000 UT) tablet, Take 2,000 Units by mouth daily, Disp: , Rfl:     conjugated estrogens (PREMARIN) vaginal cream, Insert 0 5 g into the vagina 2 (two) times a week, Disp: 30 g, Rfl: 6    doxycycline (DORYX) 100 MG EC tablet, Take 100 mg by mouth once a week , Disp: , Rfl:     lisinopril (ZESTRIL) 40 mg tablet, Take 40 mg by mouth daily, Disp: , Rfl: 1    Multiple Vitamins-Minerals (MULTIVITAMIN WOMEN PO), Take by mouth, Disp: , Rfl:     naproxen sodium (Aleve) 220 MG tablet, Take 220 mg by mouth daily at bedtime, Disp: , Rfl:     Review of Systems   Constitutional: Negative for appetite change, chills, fatigue, fever and unexpected weight change  HENT: Negative for congestion, rhinorrhea, sore throat and trouble swallowing  Respiratory: Negative for cough, shortness of breath and wheezing  Cardiovascular: Negative for chest pain, palpitations and leg swelling  Gastrointestinal: Negative for abdominal pain, blood in stool, constipation, diarrhea, nausea and vomiting  Genitourinary: Negative for difficulty urinating, dysuria, flank pain, frequency, hematuria, pelvic pain and urgency  Musculoskeletal: Negative for myalgias  Right shoulder pain   Neurological: Negative for dizziness, syncope, weakness, numbness and headaches  Hematological: Negative for adenopathy  Psychiatric/Behavioral: Negative for dysphoric mood and sleep disturbance  The patient is not nervous/anxious            Objective:    Vitals:    07/27/20 1546   BP: 144/84   BP Location: Left arm   Patient Position: Sitting   Cuff Size: Standard   Pulse: 79   Resp: 16   Temp: 97 6 °F (36 4 °C) Weight: 55 5 kg (122 lb 6 4 oz)   Height: 5' 3" (1 6 m)        Physical Exam   Constitutional: She is oriented to person, place, and time  She appears well-developed and well-nourished  No distress  HENT:   Nose: Nose normal    Mouth/Throat: Oropharynx is clear and moist    Eyes: Pupils are equal, round, and reactive to light  EOM are normal    Neck: Neck supple  No thyromegaly present  Cardiovascular: Normal rate, regular rhythm and normal heart sounds  No murmur heard  Pulmonary/Chest: Effort normal  No respiratory distress  She has no wheezes  She has no rhonchi  She has no rales  Abdominal: Soft  She exhibits no distension and no mass  There is no tenderness  Musculoskeletal: She exhibits no edema  Lymphadenopathy:     She has no cervical adenopathy  Neurological: She is alert and oriented to person, place, and time  Psychiatric: She has a normal mood and affect   Her behavior is normal  Thought content normal         Lab Results   Component Value Date    WBC 4 50 05/06/2020    HGB 13 6 05/06/2020    HCT 40 9 05/06/2020    MCV 95 05/06/2020     05/06/2020     Lab Results   Component Value Date    SODIUM 135 (L) 05/06/2020    K 4 2 05/06/2020     05/06/2020    CO2 26 05/06/2020    AGAP 9 05/06/2020    BUN 18 05/06/2020    CREATININE 0 54 (L) 05/06/2020    GLUF 95 05/06/2020    CALCIUM 9 3 05/06/2020    AST 19 05/06/2020    ALT 26 05/06/2020    ALKPHOS 78 05/06/2020    TP 6 8 05/06/2020    TBILI 0 48 05/06/2020    EGFR 91 05/06/2020

## 2020-08-11 ENCOUNTER — TRANSCRIBE ORDERS (OUTPATIENT)
Dept: ADMINISTRATIVE | Facility: HOSPITAL | Age: 80
End: 2020-08-11

## 2020-08-11 ENCOUNTER — APPOINTMENT (OUTPATIENT)
Dept: LAB | Facility: HOSPITAL | Age: 80
End: 2020-08-11
Payer: COMMERCIAL

## 2020-08-11 DIAGNOSIS — M19.011 ARTHRITIS OF RIGHT SHOULDER REGION: ICD-10-CM

## 2020-08-11 DIAGNOSIS — M25.511 RIGHT SHOULDER PAIN, UNSPECIFIED CHRONICITY: Primary | ICD-10-CM

## 2020-08-11 DIAGNOSIS — M25.511 RIGHT SHOULDER PAIN, UNSPECIFIED CHRONICITY: ICD-10-CM

## 2020-08-11 LAB
ALBUMIN SERPL BCP-MCNC: 3.7 G/DL (ref 3.5–5)
ALP SERPL-CCNC: 81 U/L (ref 46–116)
ALT SERPL W P-5'-P-CCNC: 23 U/L (ref 12–78)
ANION GAP SERPL CALCULATED.3IONS-SCNC: 4 MMOL/L (ref 4–13)
APTT PPP: 26 SECONDS (ref 23–37)
AST SERPL W P-5'-P-CCNC: 16 U/L (ref 5–45)
BACTERIA UR QL AUTO: ABNORMAL /HPF
BASOPHILS # BLD AUTO: 0.03 THOUSANDS/ΜL (ref 0–0.1)
BASOPHILS NFR BLD AUTO: 1 % (ref 0–1)
BILIRUB SERPL-MCNC: 0.66 MG/DL (ref 0.2–1)
BILIRUB UR QL STRIP: NEGATIVE
BUN SERPL-MCNC: 16 MG/DL (ref 5–25)
CALCIUM SERPL-MCNC: 9 MG/DL (ref 8.3–10.1)
CHLORIDE SERPL-SCNC: 101 MMOL/L (ref 100–108)
CLARITY UR: CLEAR
CO2 SERPL-SCNC: 27 MMOL/L (ref 21–32)
COLOR UR: YELLOW
CREAT SERPL-MCNC: 0.54 MG/DL (ref 0.6–1.3)
CRP SERPL QL: <3 MG/L
EOSINOPHIL # BLD AUTO: 0.1 THOUSAND/ΜL (ref 0–0.61)
EOSINOPHIL NFR BLD AUTO: 2 % (ref 0–6)
ERYTHROCYTE [DISTWIDTH] IN BLOOD BY AUTOMATED COUNT: 12.8 % (ref 11.6–15.1)
EST. AVERAGE GLUCOSE BLD GHB EST-MCNC: 108 MG/DL
FERRITIN SERPL-MCNC: 67 NG/ML (ref 8–388)
GFR SERPL CREATININE-BSD FRML MDRD: 91 ML/MIN/1.73SQ M
GLUCOSE P FAST SERPL-MCNC: 98 MG/DL (ref 65–99)
GLUCOSE UR STRIP-MCNC: NEGATIVE MG/DL
HBA1C MFR BLD: 5.4 %
HCT VFR BLD AUTO: 39.6 % (ref 34.8–46.1)
HGB BLD-MCNC: 13.5 G/DL (ref 11.5–15.4)
HGB UR QL STRIP.AUTO: ABNORMAL
IMM GRANULOCYTES # BLD AUTO: 0.01 THOUSAND/UL (ref 0–0.2)
IMM GRANULOCYTES NFR BLD AUTO: 0 % (ref 0–2)
INR PPP: 0.93 (ref 0.84–1.19)
IRON SATN MFR SERPL: 40 %
IRON SERPL-MCNC: 154 UG/DL (ref 50–170)
KETONES UR STRIP-MCNC: NEGATIVE MG/DL
LEUKOCYTE ESTERASE UR QL STRIP: NEGATIVE
LYMPHOCYTES # BLD AUTO: 2.02 THOUSANDS/ΜL (ref 0.6–4.47)
LYMPHOCYTES NFR BLD AUTO: 40 % (ref 14–44)
MCH RBC QN AUTO: 32.4 PG (ref 26.8–34.3)
MCHC RBC AUTO-ENTMCNC: 34.1 G/DL (ref 31.4–37.4)
MCV RBC AUTO: 95 FL (ref 82–98)
MONOCYTES # BLD AUTO: 0.47 THOUSAND/ΜL (ref 0.17–1.22)
MONOCYTES NFR BLD AUTO: 9 % (ref 4–12)
NEUTROPHILS # BLD AUTO: 2.46 THOUSANDS/ΜL (ref 1.85–7.62)
NEUTS SEG NFR BLD AUTO: 48 % (ref 43–75)
NITRITE UR QL STRIP: NEGATIVE
NON-SQ EPI CELLS URNS QL MICRO: ABNORMAL /HPF
NRBC BLD AUTO-RTO: 0 /100 WBCS
PH UR STRIP.AUTO: 7 [PH]
PLATELET # BLD AUTO: 302 THOUSANDS/UL (ref 149–390)
PMV BLD AUTO: 8.9 FL (ref 8.9–12.7)
POTASSIUM SERPL-SCNC: 4.6 MMOL/L (ref 3.5–5.3)
PROT SERPL-MCNC: 6.9 G/DL (ref 6.4–8.2)
PROT UR STRIP-MCNC: NEGATIVE MG/DL
PROTHROMBIN TIME: 12.4 SECONDS (ref 11.6–14.5)
RBC # BLD AUTO: 4.17 MILLION/UL (ref 3.81–5.12)
RBC #/AREA URNS AUTO: ABNORMAL /HPF
SODIUM SERPL-SCNC: 132 MMOL/L (ref 136–145)
SP GR UR STRIP.AUTO: 1.01 (ref 1–1.03)
TIBC SERPL-MCNC: 386 UG/DL (ref 250–450)
UROBILINOGEN UR QL STRIP.AUTO: 0.2 E.U./DL
WBC # BLD AUTO: 5.09 THOUSAND/UL (ref 4.31–10.16)
WBC #/AREA URNS AUTO: ABNORMAL /HPF

## 2020-08-11 PROCEDURE — 81001 URINALYSIS AUTO W/SCOPE: CPT | Performed by: ORTHOPAEDIC SURGERY

## 2020-08-11 PROCEDURE — 83550 IRON BINDING TEST: CPT

## 2020-08-11 PROCEDURE — 86140 C-REACTIVE PROTEIN: CPT

## 2020-08-11 PROCEDURE — 83036 HEMOGLOBIN GLYCOSYLATED A1C: CPT

## 2020-08-11 PROCEDURE — 83540 ASSAY OF IRON: CPT

## 2020-08-11 PROCEDURE — 85730 THROMBOPLASTIN TIME PARTIAL: CPT

## 2020-08-11 PROCEDURE — 82728 ASSAY OF FERRITIN: CPT

## 2020-08-11 PROCEDURE — 36415 COLL VENOUS BLD VENIPUNCTURE: CPT

## 2020-08-11 PROCEDURE — 85025 COMPLETE CBC W/AUTO DIFF WBC: CPT

## 2020-08-11 PROCEDURE — 85610 PROTHROMBIN TIME: CPT

## 2020-08-11 PROCEDURE — 80053 COMPREHEN METABOLIC PANEL: CPT

## 2020-08-17 DIAGNOSIS — Z01.818 PREOP TESTING: ICD-10-CM

## 2020-08-17 PROCEDURE — U0003 INFECTIOUS AGENT DETECTION BY NUCLEIC ACID (DNA OR RNA); SEVERE ACUTE RESPIRATORY SYNDROME CORONAVIRUS 2 (SARS-COV-2) (CORONAVIRUS DISEASE [COVID-19]), AMPLIFIED PROBE TECHNIQUE, MAKING USE OF HIGH THROUGHPUT TECHNOLOGIES AS DESCRIBED BY CMS-2020-01-R: HCPCS

## 2020-08-18 ENCOUNTER — OFFICE VISIT (OUTPATIENT)
Dept: FAMILY MEDICINE CLINIC | Facility: CLINIC | Age: 80
End: 2020-08-18
Payer: COMMERCIAL

## 2020-08-18 VITALS
WEIGHT: 118.8 LBS | TEMPERATURE: 98.2 F | SYSTOLIC BLOOD PRESSURE: 136 MMHG | HEART RATE: 99 BPM | RESPIRATION RATE: 16 BRPM | HEIGHT: 63 IN | DIASTOLIC BLOOD PRESSURE: 80 MMHG | BODY MASS INDEX: 21.05 KG/M2

## 2020-08-18 DIAGNOSIS — M19.011 PRIMARY OSTEOARTHRITIS OF RIGHT SHOULDER: ICD-10-CM

## 2020-08-18 DIAGNOSIS — Z01.818 VISIT FOR PRE-OPERATIVE EXAMINATION: Primary | ICD-10-CM

## 2020-08-18 DIAGNOSIS — E87.1 HYPONATREMIA: ICD-10-CM

## 2020-08-18 DIAGNOSIS — I10 ESSENTIAL HYPERTENSION: ICD-10-CM

## 2020-08-18 DIAGNOSIS — M25.511 RIGHT SHOULDER PAIN, UNSPECIFIED CHRONICITY: ICD-10-CM

## 2020-08-18 DIAGNOSIS — E04.1 THYROID NODULE: ICD-10-CM

## 2020-08-18 LAB — SARS-COV-2 RNA SPEC QL NAA+PROBE: NOT DETECTED

## 2020-08-18 PROCEDURE — 3075F SYST BP GE 130 - 139MM HG: CPT | Performed by: FAMILY MEDICINE

## 2020-08-18 PROCEDURE — 1170F FXNL STATUS ASSESSED: CPT | Performed by: FAMILY MEDICINE

## 2020-08-18 PROCEDURE — 3725F SCREEN DEPRESSION PERFORMED: CPT | Performed by: FAMILY MEDICINE

## 2020-08-18 PROCEDURE — 1125F AMNT PAIN NOTED PAIN PRSNT: CPT | Performed by: FAMILY MEDICINE

## 2020-08-18 PROCEDURE — 3079F DIAST BP 80-89 MM HG: CPT | Performed by: FAMILY MEDICINE

## 2020-08-18 PROCEDURE — 1160F RVW MEDS BY RX/DR IN RCRD: CPT | Performed by: FAMILY MEDICINE

## 2020-08-18 PROCEDURE — 99214 OFFICE O/P EST MOD 30 MIN: CPT | Performed by: FAMILY MEDICINE

## 2020-08-18 PROCEDURE — 1036F TOBACCO NON-USER: CPT | Performed by: FAMILY MEDICINE

## 2020-08-18 PROCEDURE — 3008F BODY MASS INDEX DOCD: CPT | Performed by: FAMILY MEDICINE

## 2020-08-18 NOTE — PROGRESS NOTES
FAMILY [facility] PRE-OPERATIVE EVALUATION  [facility] PHYSICIAN [facility]           NAME: Nasir Silverman  AGE: 68 y o  SEX: female  : 1940     DATE: 2020    Family Practice Pre-Operative Evaluation      Chief Complaint: Pre-operative Evaluation     Surgery: right shoulder arthroplasty  Anticipated Date of Surgery: 2020  Referring Provider: Dr Rajendra Ordonez       History of Present Illness: Nasir Silverman is a 68 y o  female who presents to the office today for a preoperative consultation at the request of surgeon, Dr Rajendra Ordonez, who plans on performing right shoulder arthroplasty on 2020  Planned anesthesia is general  Patient has a bleeding risk of: no recent abnormal bleeding  Patient does not have objections to receiving blood products if needed  Current anti-platelet/anti-coagulation medications that the patient is prescribed includes: none  Assessment of Chronic Conditions:   1  Essential hypertension  - stable on Lisinopril 40 mg daily    2  Thyroid nodule  - will obtain TSH and thyroid US     3   Hyponatremia  - mild, will recheck BMP In 3 months    Assessment of Cardiac Risk:  · Denies unstable or severe angina or MI in the last 6 weeks or history of stent placement in the last year   · Denies decompensated heart failure (e g  New onset heart failure, NYHA functional class IV heart failure, or worsening existing heart failure)  · Denies significant arrhythmias such as high grade AV block, symptomatic ventricular arrhythmia, newly recognized ventricular tachycardia, supraventricular tachycardia with resting heart rate >100, or symptomatic bradycardia  · Denies severe heart valve disease including aortic stenosis or symptomatic mitral stenosis     Exercise Capacity:  · Able to walk 4 blocks without symptoms?: Yes  · Able to walk 2 flights without symptoms?: Yes    Prior Anesthesia Reactions: No     Personal history of venous thromboembolic disease? No    History of steroid use for >2 weeks within last year? No         Review of Systems:     Review of Systems   Constitutional: Negative for appetite change, chills, fatigue, fever and unexpected weight change  HENT: Negative for congestion, ear pain, rhinorrhea, sore throat and trouble swallowing  Eyes: Negative for pain, discharge, redness, itching and visual disturbance  Respiratory: Negative for cough, shortness of breath and wheezing  Cardiovascular: Negative for chest pain, palpitations and leg swelling  Gastrointestinal: Negative for abdominal pain, blood in stool, constipation, diarrhea, nausea and vomiting  Genitourinary: Negative for difficulty urinating, dysuria, flank pain, frequency, hematuria, pelvic pain and urgency  Musculoskeletal: Negative for myalgias  Right shoulder pain   Skin: Negative for rash  Neurological: Negative for dizziness, syncope, weakness, numbness and headaches  Hematological: Negative for adenopathy  Psychiatric/Behavioral: Negative for dysphoric mood and sleep disturbance  The patient is not nervous/anxious  Current Problem List:     There is no problem list on file for this patient        Allergies:     No Known Allergies    Current Medications:       Current Outpatient Medications:     Cholecalciferol (VITAMIN D) 50 MCG (2000 UT) tablet, Take 2,000 Units by mouth daily, Disp: , Rfl:     conjugated estrogens (PREMARIN) vaginal cream, Insert 0 5 g into the vagina 2 (two) times a week, Disp: 30 g, Rfl: 6    doxycycline (DORYX) 100 MG EC tablet, Take 100 mg by mouth once a week , Disp: , Rfl:     lisinopril (ZESTRIL) 40 mg tablet, Take 40 mg by mouth daily, Disp: , Rfl: 1    Multiple Vitamins-Minerals (MULTIVITAMIN WOMEN PO), Take by mouth, Disp: , Rfl:     Past Medical History:       Past Medical History:   Diagnosis Date    Disease of thyroid gland     thyroid nodules    Dry eye     Hypertension     Osteoarthritis     Wears partial dentures     upper        Past Surgical History:   Procedure Laterality Date    APPENDECTOMY      CHOLECYSTECTOMY LAPAROSCOPIC      HIP SURGERY      JOINT REPLACEMENT Right     hip    LAPAROSCOPIC CHOLECYSTECTOMY      LAPAROSCOPY      TONSILLECTOMY      TUBAL LIGATION      WISDOM TOOTH EXTRACTION          Family History   Problem Relation Age of Onset    Hypertension Mother     Coronary artery disease Father     Hypertension Father     No Known Problems Brother     Alcohol abuse Neg Hx     Substance Abuse Neg Hx     Mental illness Neg Hx         Social History     Socioeconomic History    Marital status: /Civil Union     Spouse name: Not on file    Number of children: Not on file    Years of education: Not on file    Highest education level: Not on file   Occupational History    Not on file   Social Needs    Financial resource strain: Not on file    Food insecurity     Worry: Not on file     Inability: Not on file   [employer] needs     Medical: Not on file     Non-medical: Not on file   Tobacco Use    Smoking status: Former Smoker     Last attempt to quit:      Years since quittin 6    Smokeless tobacco: Never Used   Substance and Sexual Activity    Alcohol use: Yes     Frequency: 2-4 times a month     Drinks per session: 1 or 2     Comment:  Occ    Drug use: No    Sexual activity: Yes     Birth control/protection: Female Sterilization   Lifestyle    Physical activity     Days per week: Not on file     Minutes per session: Not on file    Stress: Not on file   Relationships    Social connections     Talks on phone: Not on file     Gets together: Not on file     Attends Latter-day service: Not on file     Active member of club or organization: Not on file     Attends meetings of clubs or organizations: Not on file     Relationship status: Not on file    Intimate partner violence     Fear of current or ex partner: Not on file     Emotionally abused: Not on file     Physically abused: Not on file     Forced sexual activity: Not on file   Other Topics Concern    Not on file   Social History Narrative    Not on file        Physical Exam:     /80 (BP Location: Left arm, Patient Position: Sitting, Cuff Size: Standard)   Pulse 99   Temp 98 2 °F (36 8 °C)   Resp 16   Ht 5' 3" (1 6 m)   Wt 53 9 kg (118 lb 12 8 oz)   BMI 21 04 kg/m²     Physical Exam  Constitutional:       General: She is not in acute distress  Appearance: Normal appearance  She is well-developed  HENT:      Right Ear: Tympanic membrane, ear canal and external ear normal       Left Ear: Tympanic membrane, ear canal and external ear normal       Nose: Nose normal       Mouth/Throat:      Mouth: Mucous membranes are moist       Pharynx: Oropharynx is clear  Eyes:      Extraocular Movements: Extraocular movements intact  Conjunctiva/sclera: Conjunctivae normal       Pupils: Pupils are equal, round, and reactive to light  Neck:      Musculoskeletal: Neck supple  Thyroid: No thyromegaly  Cardiovascular:      Rate and Rhythm: Normal rate and regular rhythm  Heart sounds: Normal heart sounds  No murmur  Pulmonary:      Effort: Pulmonary effort is normal  No respiratory distress  Breath sounds: No wheezing, rhonchi or rales  Abdominal:      General: There is no distension  Palpations: Abdomen is soft  There is no mass  Tenderness: There is no abdominal tenderness  Musculoskeletal:      Right lower leg: No edema  Left lower leg: No edema  Lymphadenopathy:      Cervical: No cervical adenopathy  Neurological:      General: No focal deficit present  Mental Status: She is alert and oriented to person, place, and time  Cranial Nerves: No cranial nerve deficit  Psychiatric:         Mood and Affect: Mood normal          Behavior: Behavior normal          Thought Content:  Thought content normal           Data:     Pre-operative work-up    Laboratory Results: I have personally reviewed the pertinent laboratory results/reports      EKG: I have personally reviewed pertinent reports  Chest x-ray: I have personally reviewed pertinent reports  Assessment & Recommendations:     1  Visit for pre-operative examination     2  Right shoulder pain, unspecified chronicity     3  Primary osteoarthritis of right shoulder     4  Essential hypertension  Lipid Panel with Direct LDL reflex    Comprehensive metabolic panel   5  Hyponatremia  Comprehensive metabolic panel   6  Thyroid nodule  TSH, 3rd generation with Free T4 reflex       Pre-Op Evaluation Assessment  68 y o  female with planned surgery: right shoulder arthroplasty   Known risk factors for perioperative complications: None  Current medications which may produce withdrawal symptoms if withheld perioperatively: none    Pre-Op Evaluation Plan  1  Further preoperative workup as follows:   - None; no further preoperative work-up is required    2  Medication Management/Recommendations:   - Patient has been instructed to avoid herbs or non-directed vitamins the week prior to surgery to ensure no drug interactions with perioperative surgical and anesthetic medications  - Patient should continue antihypertensive medications up through and including the day of surgery  - Patient has been instructed to avoid aspirin containing medications or non-steroidal anti-inflammatory drugs for the week preceding surgery  3  Prophylaxis for cardiac events with perioperative beta-blockers: not indicated  4  Patient requires further consultation with: None    Clearance  Patient is CLEARED for surgery without any additional cardiac testing       Dimitris Holder MD  [place] [address]  [address] [address]  Phone#  [phone]  Fax#  [phone]

## 2020-08-18 NOTE — PROGRESS NOTES
Assessment and Plan:     Problem List Items Addressed This Visit     None           Preventive health issues were discussed with patient, and age appropriate screening tests were ordered as noted in patient's After Visit Summary  Personalized health advice and appropriate referrals for health education or preventive services given if needed, as noted in patient's After Visit Summary  History of Present Illness:     Patient presents for Medicare Annual Wellness visit    Patient Care Team:  Garfield Mcclelland MD as PCP - General (Family Medicine)     Problem List:     There is no problem list on file for this patient       Past Medical and Surgical History:     Past Medical History:   Diagnosis Date    Disease of thyroid gland     thyroid nodules    Dry eye     Hypertension     Osteoarthritis     Wears partial dentures     upper     Past Surgical History:   Procedure Laterality Date    APPENDECTOMY      CHOLECYSTECTOMY LAPAROSCOPIC      HIP SURGERY      JOINT REPLACEMENT Right     hip    LAPAROSCOPIC CHOLECYSTECTOMY      LAPAROSCOPY      TONSILLECTOMY      TUBAL LIGATION      WISDOM TOOTH EXTRACTION        Family History:     Family History   Problem Relation Age of Onset    Hypertension Mother     Coronary artery disease Father     Hypertension Father     No Known Problems Brother     Alcohol abuse Neg Hx     Substance Abuse Neg Hx     Mental illness Neg Hx       Social History:        Social History     Socioeconomic History    Marital status: /Civil Union     Spouse name: None    Number of children: None    Years of education: None    Highest education level: None   Occupational History    None   Social Needs    Financial resource strain: None    Food insecurity     Worry: None     Inability: None    Transportation needs     Medical: None     Non-medical: None   Tobacco Use    Smoking status: Former Smoker     Last attempt to quit:      Years since quittin 6    Smokeless tobacco: Never Used   Substance and Sexual Activity    Alcohol use: Yes     Frequency: 2-4 times a month     Drinks per session: 1 or 2     Comment: Occ    Drug use: No    Sexual activity: Yes     Birth control/protection: Female Sterilization   Lifestyle    Physical activity     Days per week: None     Minutes per session: None    Stress: None   Relationships    Social connections     Talks on phone: None     Gets together: None     Attends Taoism service: None     Active member of club or organization: None     Attends meetings of clubs or organizations: None     Relationship status: None    Intimate partner violence     Fear of current or ex partner: None     Emotionally abused: None     Physically abused: None     Forced sexual activity: None   Other Topics Concern    None   Social History Narrative    None      Medications and Allergies:     Current Outpatient Medications   Medication Sig Dispense Refill    Cholecalciferol (VITAMIN D) 50 MCG (2000 UT) tablet Take 2,000 Units by mouth daily      conjugated estrogens (PREMARIN) vaginal cream Insert 0 5 g into the vagina 2 (two) times a week 30 g 6    doxycycline (DORYX) 100 MG EC tablet Take 100 mg by mouth once a week       lisinopril (ZESTRIL) 40 mg tablet Take 40 mg by mouth daily  1    Multiple Vitamins-Minerals (MULTIVITAMIN WOMEN PO) Take by mouth      naproxen sodium (Aleve) 220 MG tablet Take 220 mg by mouth daily at bedtime       No current facility-administered medications for this visit        No Known Allergies   Immunizations:     Immunization History   Administered Date(s) Administered    INFLUENZA 09/24/2014, 10/02/2017    Influenza Split High Dose Preservative Free IM 10/11/2018, 09/30/2019    Zoster Vaccine Recombinant 07/28/2020      Health Maintenance:         Topic Date Due    DXA SCAN  1940    Cervical Cancer Screening  01/28/2021         Topic Date Due    DTaP,Tdap,and Td Vaccines (1 - Tdap) 04/16/1963  Pneumococcal Vaccine: 65+ Years (1 of 1 - PPSV23) 04/16/2007    Influenza Vaccine  07/01/2020      Medicare Health Risk Assessment:     /80 (BP Location: Left arm, Patient Position: Sitting, Cuff Size: Standard)   Pulse 99   Temp 98 2 °F (36 8 °C)   Resp 16   Ht 5' 3" (1 6 m)   Wt 53 9 kg (118 lb 12 8 oz)   BMI 21 04 kg/m²          Health Risk Assessment:   Patient rates overall health as fair  Patient feels that their physical health rating is slightly worse  Eyesight was rated as same  Hearing was rated as slightly worse  Patient feels that their emotional and mental health rating is same  Pain experienced in the last 7 days has been a lot  Patient's pain rating has been 8/10  Patient states that she has experienced no weight loss or gain in last 6 months  Depression Screening:   PHQ-2 Score: 0      Fall Risk Screening: In the past year, patient has experienced: no history of falling in past year      Urinary Incontinence Screening:   Patient has leaked urine accidently in the last six months  Home Safety:  Patient does not have trouble with stairs inside or outside of their home  Patient has working smoke alarms and has working carbon monoxide detector  Home safety hazards include: loose rugs on the floor  No stairs    Nutrition:   Current diet is Regular and Limited junk food  Medications:   Patient is currently taking over-the-counter supplements  OTC medications include: see medication list  Patient is able to manage medications  Activities of Daily Living (ADLs)/Instrumental Activities of Daily Living (IADLs):   Walk and transfer into and out of bed and chair?: Yes  Dress and groom yourself?: Yes    Bathe or shower yourself?: Yes    Feed yourself?  Yes  Do your laundry/housekeeping?: Yes  Manage your money, pay your bills and track your expenses?: Yes  Make your own meals?: Yes    Do your own shopping?: Yes    Previous Hospitalizations:   Any hospitalizations or ED visits within the last 12 months?: No      Advance Care Planning:   Living will: Yes    Advanced directive: Yes      PREVENTIVE SCREENINGS        Diabetes Screening:     General: Screening Current      Cervical Cancer Screening:    General: Screening Not Indicated      Lung Cancer Screening:     General: Screening Not Indicated      Patricia Landers MD

## 2020-08-18 NOTE — PATIENT INSTRUCTIONS
Medicare Preventive Visit Patient Instructions  Thank you for completing your Welcome to Medicare Visit or Medicare Annual Wellness Visit today  Your next wellness visit will be due in one year (8/18/2021)  The screening/preventive services that you may require over the next 5-10 years are detailed below  Some tests may not apply to you based off risk factors and/or age  Screening tests ordered at today's visit but not completed yet may show as past due  Also, please note that scanned in results may not display below  Preventive Screenings:  Service Recommendations Previous Testing/Comments   Colorectal Cancer Screening  * Colonoscopy    * Fecal Occult Blood Test (FOBT)/Fecal Immunochemical Test (FIT)  * Fecal DNA/Cologuard Test  * Flexible Sigmoidoscopy Age: 54-65 years old   Colonoscopy: every 10 years (may be performed more frequently if at higher risk)  OR  FOBT/FIT: every 1 year  OR  Cologuard: every 3 years  OR  Sigmoidoscopy: every 5 years  Screening may be recommended earlier than age 48 if at higher risk for colorectal cancer  Also, an individualized decision between you and your healthcare provider will decide whether screening between the ages of 74-80 would be appropriate  Colonoscopy: Not on file  FOBT/FIT: Not on file  Cologuard: Not on file  Sigmoidoscopy: Not on file         Breast Cancer Screening Age: 36 years old  Frequency: every 1-2 years  Not required if history of left and right mastectomy Mammogram: Not on file       Cervical Cancer Screening Between the ages of 21-29, pap smear recommended once every 3 years  Between the ages of 33-67, can perform pap smear with HPV co-testing every 5 years     Recommendations may differ for women with a history of total hysterectomy, cervical cancer, or abnormal pap smears in past  Pap Smear: 01/28/2019    Screening Not Indicated   Hepatitis C Screening Once for adults born between 1945 and 1965  More frequently in patients at high risk for Hepatitis C Hep C Antibody: Not on file       Diabetes Screening 1-2 times per year if you're at risk for diabetes or have pre-diabetes Fasting glucose: 98 mg/dL   A1C: 5 4 %    Screening Current   Cholesterol Screening Once every 5 years if you don't have a lipid disorder  May order more often based on risk factors  Lipid panel: Not on file         Other Preventive Screenings Covered by Medicare:  1  Abdominal Aortic Aneurysm (AAA) Screening: covered once if your at risk  You're considered to be at risk if you have a family history of AAA  2  Lung Cancer Screening: covers low dose CT scan once per year if you meet all of the following conditions: (1) Age 50-69; (2) No signs or symptoms of lung cancer; (3) Current smoker or have quit smoking within the last 15 years; (4) You have a tobacco smoking history of at least 30 pack years (packs per day multiplied by number of years you smoked); (5) You get a written order from a healthcare provider  3  Glaucoma Screening: covered annually if you're considered high risk: (1) You have diabetes OR (2) Family history of glaucoma OR (3)  aged 48 and older OR (3)  American aged 72 and older  3  Osteoporosis Screening: covered every 2 years if you meet one of the following conditions: (1) You're estrogen deficient and at risk for osteoporosis based off medical history and other findings; (2) Have a vertebral abnormality; (3) On glucocorticoid therapy for more than 3 months; (4) Have primary hyperparathyroidism; (5) On osteoporosis medications and need to assess response to drug therapy  · Last bone density test (DXA Scan): Not on file  5  HIV Screening: covered annually if you're between the age of 12-76  Also covered annually if you are younger than 13 and older than 72 with risk factors for HIV infection  For pregnant patients, it is covered up to 3 times per pregnancy      Immunizations:  Immunization Recommendations   Influenza Vaccine Annual influenza vaccination during flu season is recommended for all persons aged >= 6 months who do not have contraindications   Pneumococcal Vaccine (Prevnar and Pneumovax)  * Prevnar = PCV13  * Pneumovax = PPSV23   Adults 25-60 years old: 1-3 doses may be recommended based on certain risk factors  Adults 72 years old: Prevnar (PCV13) vaccine recommended followed by Pneumovax (PPSV23) vaccine  If already received PPSV23 since turning 65, then PCV13 recommended at least one year after PPSV23 dose  Hepatitis B Vaccine 3 dose series if at intermediate or high risk (ex: diabetes, end stage renal disease, liver disease)   Tetanus (Td) Vaccine - COST NOT COVERED BY MEDICARE PART B Following completion of primary series, a booster dose should be given every 10 years to maintain immunity against tetanus  Td may also be given as tetanus wound prophylaxis  Tdap Vaccine - COST NOT COVERED BY MEDICARE PART B Recommended at least once for all adults  For pregnant patients, recommended with each pregnancy  Shingles Vaccine (Shingrix) - COST NOT COVERED BY MEDICARE PART B  2 shot series recommended in those aged 48 and above     Health Maintenance Due:      Topic Date Due    DXA SCAN  1940    Cervical Cancer Screening  01/28/2021     Immunizations Due:      Topic Date Due    DTaP,Tdap,and Td Vaccines (1 - Tdap) 04/16/1963    Pneumococcal Vaccine: 65+ Years (1 of 1 - PPSV23) 04/16/2007    Influenza Vaccine  07/01/2020     Advance Directives   What are advance directives? Advance directives are legal documents that state your wishes and plans for medical care  These plans are made ahead of time in case you lose your ability to make decisions for yourself  Advance directives can apply to any medical decision, such as the treatments you want, and if you want to donate organs  What are the types of advance directives? There are many types of advance directives, and each state has rules about how to use them   You may choose a combination of any of the following:  · Living will: This is a written record of the treatment you want  You can also choose which treatments you do not want, which to limit, and which to stop at a certain time  This includes surgery, medicine, IV fluid, and tube feedings  · Durable power of  for healthcare [place] [facility]): This is a written record that states who you want to make healthcare choices for you when you are unable to make them for yourself  This person, called a proxy, is usually a family member or a friend  You may choose more than 1 proxy  · Do not resuscitate (DNR) order:  A DNR order is used in case your heart stops beating or you stop breathing  It is a request not to have certain forms of treatment, such as CPR  A DNR order may be included in other types of advance directives  · Medical directive: This covers the care that you want if you are in a coma, near death, or unable to make decisions for yourself  You can list the treatments you want for each condition  Treatment may include pain medicine, surgery, blood transfusions, dialysis, IV or tube feedings, and a ventilator (breathing machine)  · Values history: This document has questions about your views, beliefs, and how you feel and think about life  This information can help others choose the care that you would choose  Why are advance directives important? An advance directive helps you control your care  Although spoken wishes may be used, it is better to have your wishes written down  Spoken wishes can be misunderstood, or not followed  Treatments may be given even if you do not want them  An advance directive may make it easier for your family to make difficult choices about your care  Urinary Incontinence   Urinary incontinence (UI)  is when you lose control of your bladder  UI develops because your bladder cannot store or empty urine properly   The 3 most common types of UI are stress incontinence, urge incontinence, or both   Medicines:   · May be given to help strengthen your bladder control  Report any side effects of medication to your healthcare provider  Do pelvic muscle exercises often:  Your pelvic muscles help you stop urinating  Squeeze these muscles tight for 5 seconds, then relax for 5 seconds  Gradually work up to squeezing for 10 seconds  Do 3 sets of 15 repetitions a day, or as directed  This will help strengthen your pelvic muscles and improve bladder control  Train your bladder:  Go to the bathroom at set times, such as every 2 hours, even if you do not feel the urge to go  You can also try to hold your urine when you feel the urge to go  For example, hold your urine for 5 minutes when you feel the urge to go  As that becomes easier, hold your urine for 10 minutes  Self-care:   · Keep a UI record  Write down how often you leak urine and how much you leak  Make a note of what you were doing when you leaked urine  · Drink liquids as directed  You may need to limit the amount of liquid you drink to help control your urine leakage  Do not drink any liquid right before you go to bed  Limit or do not have drinks that contain caffeine or alcohol  · Prevent constipation  Eat a variety of high-fiber foods  Good examples are high-fiber cereals, beans, vegetables, and whole-grain breads  Walking is the best way to trigger your intestines to have a bowel movement  · Exercise regularly and maintain a healthy weight  Weight loss and exercise will decrease pressure on your bladder and help you control your leakage  · Use a catheter as directed  to help empty your bladder  A catheter is a tiny, plastic tube that is put into your bladder to drain your urine  · Go to behavior therapy as directed  Behavior therapy may be used to help you learn to control your urge to urinate         © Copyright [employer] 2018 Information is for End User's use only and may not be sold, redistributed or otherwise used for commercial purposes   All illustrations and images included in CareNotes® are the copyrighted property of A D A M , Inc  or [facility] [place]

## 2020-08-24 ENCOUNTER — ANESTHESIA EVENT (OUTPATIENT)
Dept: PERIOP | Facility: HOSPITAL | Age: 80
DRG: 483 | End: 2020-08-24
Payer: COMMERCIAL

## 2020-08-25 ENCOUNTER — HOSPITAL ENCOUNTER (INPATIENT)
Facility: HOSPITAL | Age: 80
LOS: 1 days | Discharge: HOME | DRG: 483 | End: 2020-08-26
Attending: ORTHOPAEDIC SURGERY | Admitting: ORTHOPAEDIC SURGERY
Payer: COMMERCIAL

## 2020-08-25 ENCOUNTER — ANESTHESIA (OUTPATIENT)
Dept: PERIOP | Facility: HOSPITAL | Age: 80
DRG: 483 | End: 2020-08-25
Payer: COMMERCIAL

## 2020-08-25 ENCOUNTER — APPOINTMENT (INPATIENT)
Dept: RADIOLOGY | Facility: HOSPITAL | Age: 80
DRG: 483 | End: 2020-08-25
Payer: COMMERCIAL

## 2020-08-25 DIAGNOSIS — M12.811 ROTATOR CUFF TEAR ARTHROPATHY OF RIGHT SHOULDER: Primary | ICD-10-CM

## 2020-08-25 DIAGNOSIS — M75.101 ROTATOR CUFF TEAR ARTHROPATHY OF RIGHT SHOULDER: Primary | ICD-10-CM

## 2020-08-25 LAB
ABO GROUP BLD: NORMAL
ABO GROUP BLD: NORMAL
BLD GP AB SCN SERPL QL: NEGATIVE
RH BLD: NEGATIVE
RH BLD: NEGATIVE
SPECIMEN EXPIRATION DATE: NORMAL

## 2020-08-25 PROCEDURE — C1776 JOINT DEVICE (IMPLANTABLE): HCPCS | Performed by: ORTHOPAEDIC SURGERY

## 2020-08-25 PROCEDURE — 86850 RBC ANTIBODY SCREEN: CPT | Performed by: ORTHOPAEDIC SURGERY

## 2020-08-25 PROCEDURE — C1713 ANCHOR/SCREW BN/BN,TIS/BN: HCPCS | Performed by: ORTHOPAEDIC SURGERY

## 2020-08-25 PROCEDURE — 86900 BLOOD TYPING SEROLOGIC ABO: CPT | Performed by: ORTHOPAEDIC SURGERY

## 2020-08-25 PROCEDURE — 0RRJ00Z REPLACEMENT OF RIGHT SHOULDER JOINT WITH REVERSE BALL AND SOCKET SYNTHETIC SUBSTITUTE, OPEN APPROACH: ICD-10-PCS | Performed by: ORTHOPAEDIC SURGERY

## 2020-08-25 PROCEDURE — 73020 X-RAY EXAM OF SHOULDER: CPT

## 2020-08-25 PROCEDURE — 86901 BLOOD TYPING SEROLOGIC RH(D): CPT | Performed by: ORTHOPAEDIC SURGERY

## 2020-08-25 DEVICE — 36 +3 / 33 COMBO HUMERAL INSERT
Type: IMPLANTABLE DEVICE | Site: SHOULDER | Status: FUNCTIONAL
Brand: ARTHREX®

## 2020-08-25 DEVICE — BASEPLATE 24MM MONOBLOCK POST: Type: IMPLANTABLE DEVICE | Site: SHOULDER | Status: FUNCTIONAL

## 2020-08-25 DEVICE — IMPLANTABLE DEVICE: Type: IMPLANTABLE DEVICE | Site: SHOULDER | Status: FUNCTIONAL

## 2020-08-25 DEVICE — 5.5X16MM PERIPHERAL SCREW, LOCKING
Type: IMPLANTABLE DEVICE | Site: SHOULDER | Status: FUNCTIONAL
Brand: ARTHREX®

## 2020-08-25 DEVICE — 5.5X32MM PERIPHERAL SCREW, LOCKING
Type: IMPLANTABLE DEVICE | Site: SHOULDER | Status: FUNCTIONAL
Brand: ARTHREX®

## 2020-08-25 DEVICE — UNIVERS REVERS SUTURE CUP, 36 (NEUTRAL)
Type: IMPLANTABLE DEVICE | Site: SHOULDER | Status: FUNCTIONAL
Brand: ARTHREX®

## 2020-08-25 DEVICE — 33 +4 LAT/24 GLENOSPHERE
Type: IMPLANTABLE DEVICE | Site: SHOULDER | Status: FUNCTIONAL
Brand: ARTHREX®

## 2020-08-25 RX ORDER — MIDAZOLAM HYDROCHLORIDE 2 MG/2ML
INJECTION, SOLUTION INTRAMUSCULAR; INTRAVENOUS AS NEEDED
Status: DISCONTINUED | OUTPATIENT
Start: 2020-08-25 | End: 2020-08-25

## 2020-08-25 RX ORDER — OXYCODONE HYDROCHLORIDE 5 MG/1
5 TABLET ORAL EVERY 4 HOURS PRN
Status: DISCONTINUED | OUTPATIENT
Start: 2020-08-25 | End: 2020-08-26 | Stop reason: HOSPADM

## 2020-08-25 RX ORDER — ONDANSETRON 2 MG/ML
4 INJECTION INTRAMUSCULAR; INTRAVENOUS ONCE AS NEEDED
Status: DISCONTINUED | OUTPATIENT
Start: 2020-08-25 | End: 2020-08-25 | Stop reason: HOSPADM

## 2020-08-25 RX ORDER — SENNOSIDES 8.6 MG
1 TABLET ORAL DAILY
Status: DISCONTINUED | OUTPATIENT
Start: 2020-08-25 | End: 2020-08-26 | Stop reason: HOSPADM

## 2020-08-25 RX ORDER — MAGNESIUM HYDROXIDE 1200 MG/15ML
LIQUID ORAL AS NEEDED
Status: DISCONTINUED | OUTPATIENT
Start: 2020-08-25 | End: 2020-08-25 | Stop reason: HOSPADM

## 2020-08-25 RX ORDER — ONDANSETRON 2 MG/ML
4 INJECTION INTRAMUSCULAR; INTRAVENOUS EVERY 6 HOURS PRN
Status: DISCONTINUED | OUTPATIENT
Start: 2020-08-25 | End: 2020-08-26 | Stop reason: HOSPADM

## 2020-08-25 RX ORDER — ACETAMINOPHEN 325 MG/1
650 TABLET ORAL EVERY 6 HOURS PRN
Status: DISCONTINUED | OUTPATIENT
Start: 2020-08-25 | End: 2020-08-26 | Stop reason: HOSPADM

## 2020-08-25 RX ORDER — SODIUM CHLORIDE, SODIUM LACTATE, POTASSIUM CHLORIDE, CALCIUM CHLORIDE 600; 310; 30; 20 MG/100ML; MG/100ML; MG/100ML; MG/100ML
100 INJECTION, SOLUTION INTRAVENOUS CONTINUOUS
Status: DISCONTINUED | OUTPATIENT
Start: 2020-08-25 | End: 2020-08-26 | Stop reason: HOSPADM

## 2020-08-25 RX ORDER — CEFAZOLIN SODIUM 2 G/50ML
2000 SOLUTION INTRAVENOUS ONCE
Status: COMPLETED | OUTPATIENT
Start: 2020-08-25 | End: 2020-08-25

## 2020-08-25 RX ORDER — SENNOSIDES 8.6 MG
1 TABLET ORAL DAILY
Qty: 120 EACH | Refills: 0
Start: 2020-08-26 | End: 2021-01-26

## 2020-08-25 RX ORDER — FENTANYL CITRATE/PF 50 MCG/ML
25 SYRINGE (ML) INJECTION
Status: DISCONTINUED | OUTPATIENT
Start: 2020-08-25 | End: 2020-08-25 | Stop reason: HOSPADM

## 2020-08-25 RX ORDER — METOCLOPRAMIDE HYDROCHLORIDE 5 MG/ML
INJECTION INTRAMUSCULAR; INTRAVENOUS AS NEEDED
Status: DISCONTINUED | OUTPATIENT
Start: 2020-08-25 | End: 2020-08-25

## 2020-08-25 RX ORDER — CEFAZOLIN SODIUM 1 G/50ML
1000 SOLUTION INTRAVENOUS EVERY 8 HOURS
Status: COMPLETED | OUTPATIENT
Start: 2020-08-25 | End: 2020-08-26

## 2020-08-25 RX ORDER — DEXAMETHASONE SODIUM PHOSPHATE 4 MG/ML
INJECTION, SOLUTION INTRA-ARTICULAR; INTRALESIONAL; INTRAMUSCULAR; INTRAVENOUS; SOFT TISSUE AS NEEDED
Status: DISCONTINUED | OUTPATIENT
Start: 2020-08-25 | End: 2020-08-25

## 2020-08-25 RX ORDER — ASPIRIN 325 MG
325 TABLET ORAL DAILY
Status: DISCONTINUED | OUTPATIENT
Start: 2020-08-25 | End: 2020-08-26 | Stop reason: HOSPADM

## 2020-08-25 RX ORDER — LISINOPRIL 20 MG/1
40 TABLET ORAL DAILY
Status: DISCONTINUED | OUTPATIENT
Start: 2020-08-26 | End: 2020-08-26 | Stop reason: HOSPADM

## 2020-08-25 RX ORDER — OXYCODONE HYDROCHLORIDE 10 MG/1
10 TABLET ORAL EVERY 6 HOURS PRN
Status: DISCONTINUED | OUTPATIENT
Start: 2020-08-25 | End: 2020-08-26 | Stop reason: HOSPADM

## 2020-08-25 RX ORDER — DOCUSATE SODIUM 100 MG/1
100 CAPSULE, LIQUID FILLED ORAL 2 TIMES DAILY
Status: DISCONTINUED | OUTPATIENT
Start: 2020-08-25 | End: 2020-08-26 | Stop reason: HOSPADM

## 2020-08-25 RX ORDER — OXYCODONE HYDROCHLORIDE 5 MG/1
5 TABLET ORAL EVERY 4 HOURS PRN
Qty: 30 TABLET | Refills: 0
Start: 2020-08-25 | End: 2020-09-04

## 2020-08-25 RX ORDER — PROPOFOL 10 MG/ML
INJECTION, EMULSION INTRAVENOUS AS NEEDED
Status: DISCONTINUED | OUTPATIENT
Start: 2020-08-25 | End: 2020-08-25

## 2020-08-25 RX ORDER — COVID-19 ANTIGEN TEST
1 KIT MISCELLANEOUS 2 TIMES DAILY
COMMUNITY
End: 2020-08-26 | Stop reason: HOSPADM

## 2020-08-25 RX ORDER — LIDOCAINE HYDROCHLORIDE 20 MG/ML
INJECTION, SOLUTION EPIDURAL; INFILTRATION; INTRACAUDAL; PERINEURAL AS NEEDED
Status: DISCONTINUED | OUTPATIENT
Start: 2020-08-25 | End: 2020-08-25

## 2020-08-25 RX ORDER — ACETAMINOPHEN 325 MG/1
650 TABLET ORAL EVERY 6 HOURS PRN
Qty: 30 TABLET | Refills: 0
Start: 2020-08-25 | End: 2021-01-26

## 2020-08-25 RX ORDER — HYDROMORPHONE HCL/PF 1 MG/ML
0.5 SYRINGE (ML) INJECTION EVERY 2 HOUR PRN
Status: DISCONTINUED | OUTPATIENT
Start: 2020-08-25 | End: 2020-08-26

## 2020-08-25 RX ORDER — ROPIVACAINE HYDROCHLORIDE 5 MG/ML
INJECTION, SOLUTION EPIDURAL; INFILTRATION; PERINEURAL AS NEEDED
Status: DISCONTINUED | OUTPATIENT
Start: 2020-08-25 | End: 2020-08-25

## 2020-08-25 RX ORDER — FENTANYL CITRATE 50 UG/ML
INJECTION, SOLUTION INTRAMUSCULAR; INTRAVENOUS AS NEEDED
Status: DISCONTINUED | OUTPATIENT
Start: 2020-08-25 | End: 2020-08-25

## 2020-08-25 RX ORDER — DOCUSATE SODIUM 100 MG/1
100 CAPSULE, LIQUID FILLED ORAL 2 TIMES DAILY
Qty: 10 CAPSULE | Refills: 0
Start: 2020-08-25 | End: 2021-01-26

## 2020-08-25 RX ORDER — SODIUM CHLORIDE 9 MG/ML
125 INJECTION, SOLUTION INTRAVENOUS CONTINUOUS
Status: DISCONTINUED | OUTPATIENT
Start: 2020-08-25 | End: 2020-08-26 | Stop reason: HOSPADM

## 2020-08-25 RX ORDER — ASPIRIN 325 MG
325 TABLET ORAL DAILY
Refills: 0
Start: 2020-08-26 | End: 2021-01-26

## 2020-08-25 RX ORDER — GLYCOPYRROLATE 0.2 MG/ML
INJECTION INTRAMUSCULAR; INTRAVENOUS AS NEEDED
Status: DISCONTINUED | OUTPATIENT
Start: 2020-08-25 | End: 2020-08-25

## 2020-08-25 RX ORDER — ONDANSETRON 2 MG/ML
INJECTION INTRAMUSCULAR; INTRAVENOUS AS NEEDED
Status: DISCONTINUED | OUTPATIENT
Start: 2020-08-25 | End: 2020-08-25

## 2020-08-25 RX ORDER — KETOROLAC TROMETHAMINE 30 MG/ML
15 INJECTION, SOLUTION INTRAMUSCULAR; INTRAVENOUS EVERY 6 HOURS PRN
Status: DISCONTINUED | OUTPATIENT
Start: 2020-08-25 | End: 2020-08-26 | Stop reason: HOSPADM

## 2020-08-25 RX ADMIN — CEFAZOLIN SODIUM 2000 MG: 2 SOLUTION INTRAVENOUS at 11:19

## 2020-08-25 RX ADMIN — MIDAZOLAM 2 MG: 1 INJECTION INTRAMUSCULAR; INTRAVENOUS at 11:06

## 2020-08-25 RX ADMIN — GLYCOPYRROLATE 0.1 MG: 0.2 INJECTION, SOLUTION INTRAMUSCULAR; INTRAVENOUS at 11:38

## 2020-08-25 RX ADMIN — OXYCODONE HYDROCHLORIDE 10 MG: 10 TABLET ORAL at 22:45

## 2020-08-25 RX ADMIN — ROPIVACAINE HYDROCHLORIDE 30 ML: 5 INJECTION, SOLUTION EPIDURAL; INFILTRATION; PERINEURAL at 11:10

## 2020-08-25 RX ADMIN — FENTANYL CITRATE 100 MCG: 50 INJECTION, SOLUTION INTRAMUSCULAR; INTRAVENOUS at 11:06

## 2020-08-25 RX ADMIN — ASPIRIN 325 MG ORAL TABLET 325 MG: 325 PILL ORAL at 15:55

## 2020-08-25 RX ADMIN — SODIUM CHLORIDE, SODIUM LACTATE, POTASSIUM CHLORIDE, AND CALCIUM CHLORIDE 100 ML/HR: .6; .31; .03; .02 INJECTION, SOLUTION INTRAVENOUS at 15:55

## 2020-08-25 RX ADMIN — SENNOSIDES 8.6 MG: 8.6 TABLET, FILM COATED ORAL at 15:55

## 2020-08-25 RX ADMIN — PROPOFOL 120 MG: 10 INJECTION, EMULSION INTRAVENOUS at 11:40

## 2020-08-25 RX ADMIN — SODIUM CHLORIDE 125 ML/HR: 0.9 INJECTION, SOLUTION INTRAVENOUS at 09:48

## 2020-08-25 RX ADMIN — LIDOCAINE HYDROCHLORIDE 40 MG: 20 INJECTION, SOLUTION EPIDURAL; INFILTRATION; INTRACAUDAL at 11:40

## 2020-08-25 RX ADMIN — DEXAMETHASONE SODIUM PHOSPHATE 4 MG: 4 INJECTION, SOLUTION INTRAMUSCULAR; INTRAVENOUS at 12:11

## 2020-08-25 RX ADMIN — CEFAZOLIN SODIUM 1000 MG: 1 SOLUTION INTRAVENOUS at 19:35

## 2020-08-25 RX ADMIN — METOCLOPRAMIDE 5 MG: 5 INJECTION, SOLUTION INTRAMUSCULAR; INTRAVENOUS at 11:38

## 2020-08-25 RX ADMIN — ONDANSETRON 4 MG: 2 INJECTION INTRAMUSCULAR; INTRAVENOUS at 13:13

## 2020-08-25 RX ADMIN — SODIUM CHLORIDE: 0.9 INJECTION, SOLUTION INTRAVENOUS at 13:11

## 2020-08-25 RX ADMIN — DOCUSATE SODIUM 100 MG: 100 CAPSULE, LIQUID FILLED ORAL at 17:17

## 2020-08-25 NOTE — ANESTHESIA PROCEDURE NOTES
Peripheral Block    Patient location during procedure: holding area  Start time: 8/25/2020 11:06 AM  Reason for block: at surgeon's request and post-op pain management  Staffing  Anesthesiologist: Lindsey Nayak,   Performed: anesthesiologist   Preanesthetic Checklist  Completed: patient identified, site marked, surgical consent, pre-op evaluation, timeout performed, IV checked, risks and benefits discussed and monitors and equipment checked  Peripheral Block  Patient position: supine  Prep: ChloraPrep  Patient monitoring: heart rate, continuous pulse ox and frequent blood pressure checks  Block type: interscalene  Laterality: right  Injection technique: single-shot  Procedures: ultrasound guided, Ultrasound guidance required for the procedure to increase accuracy and safety of medication placement and decrease risk of complications   and nerve stimulator  Ultrasound permanent image saved  Needle  Needle type: Stimuplex   Needle gauge: 22 G  Needle length: 5 cm  Needle localization: ultrasound guidance and nerve stimulator  Assessment  Injection assessment: incremental injection, local visualized surrounding nerve on ultrasound, negative aspiration for CSF, negative aspiration for heme and no paresthesia on injection  Paresthesia pain: none  Heart rate change: no  Slow fractionated injection: yes  Post-procedure:  site cleaned  patient tolerated the procedure well with no immediate complications

## 2020-08-25 NOTE — ANESTHESIA POSTPROCEDURE EVALUATION
Post-Op Assessment Note    CV Status:  Stable    Pain management: adequate     Mental Status:  Alert and awake   Hydration Status:  Euvolemic   PONV Controlled:  Controlled   Airway Patency:  Patent      Post Op Vitals Reviewed: Yes      Staff: Anesthesiologist         No complications documented      /70 (08/25/20 1357)    Temp      Pulse 64 (08/25/20 1357)   Resp 14 (08/25/20 1357)    SpO2 98 % (08/25/20 1357)

## 2020-08-25 NOTE — OP NOTE
Right Reverse Total Shoulder Replacement    Patient Name: Scott Padilla  MRN: [id number]  Date of Surgery 8/25/2020    Surgeon: Maribell Francois MD  Assistant: Jt [facility]      Anesthesia: Scalene block plus general anesthesia  Preoperative diagnosis: Right shoulder degenerative joint disease  Unrepairable rotator cuff tear      Postoperative diagnosis: Right shoulder degenerative joint disease  Unrepairable rotator cuff tear    Operative procedure: Right Reverse total shoulder arthroplasty    Implants:   Implant Name Type Inv  Item Serial No   Lot No  LRB No  Used Action   BASEPLATE 77UI MONOBLOCK POST - [id number]  BASEPLATE 52WW MONOBLOCK POST  [employer] [id number] Right 1 Implanted   SCREW PERIPHERAL 4 5 X 16MM NON LCK - [id number]  SCREW PERIPHERAL 4 5 X 16MM NON LCK  [employer] [id number] Right 1 Implanted   SCREW PERIPHERAL 5 5 X 32MM LCK - [id number]  SCREW PERIPHERAL 5 5 X 32MM LCK  [employer] [id number] Right 1 Implanted   Arthrex univers revers modular glenoid system glenosphere, 33+4 LAT/24    [employer] [id number] Right 1 Implanted   SCREW PERIPHERAL 5 5 X 16MM LCK - [id number]  SCREW PERIPHERAL 5 5 X 16MM LCK  [employer] [id number] Right 1 Implanted   SCREW PERIPHERAL 5 5 X 40MM LCK - [id number]  SCREW PERIPHERAL 5 5 X 40MM LCK  [employer] [id number] Right 1 Implanted   CUP SUTURE 36 NEUTRAL UNIVERS REVERS - [id number]  CUP SUTURE 36 NEUTRAL UNIVERS REVERS  [employer] [id number] Right 1 Implanted   STEM HUM SZ 5 UNIVERS REVERS - [id number]  STEM HUM SZ 5 UNIVERS REVERS  [employer] [id number] Right 1 Implanted   UNIVERS MODULAR GLENOID COMBO HUMERAL INSERT 36 +3 / 33    [employer] [id number] Right 1 Implanted       Drains: None    Estimated blood loss: 100 cc  Antibiotics: Given preoperatively    Clinical note: Scott Padilla is a 68 y o  female who presents with severe right shoulder pain and disability  Physical exam investigations was consistent with degenerative joint disease  The patient had been treated nonoperatively and failed to improve  Nonoperative versus operative options reviewed  The patient did understand the situation and did wish to proceed with operative management    Description of procedure: The patient was identified as [place] and the right shoulder as the surgical site  Anesthesia was administered  The patient was placed in modified beachchair position with the right shoulder and upper extremity prepped and draped in usual fashion for shoulder surgery  Utilizing a deltopectoral approach, sharp dissection was carried down through skin  Hemostasis was obtained using electrocautery  The deltoid and cephalic vein were then identified and retracted laterally with the pectoralis muscle retracted medially  The conjoined tendon was identified and retracted medially  The bicipital groove was then identified and incised  The biceps tendon was elevated from the wound and was found to have degenerative changes with partial tearing and inflammation  The biceps tendon was then divided and tenodesed to the pectoralis major utilizing #2 FiberWire  Appropriate retractors were placed and The subscapularis tendon was lifted off the humerus and tagged  Extensive bursitis was excised  A complete tear of the supraspinatus and infraspinous tendinosis identified  End-stage arthritis of the humeral head was noted  The proximal cutting guide was then utilized to create a 30° retroverted cut  Retractors were then placed about the glenoid  The glenoid was found to be in satisfactory condition for placement of the glenosphere  Utilizing the cannulated system, the glenoid was prepared with appropriate soft tissue releases  The baseplate was impacted and 2 locking 2 nonlocking screws were utilized to fix it in place  A 36 mm glenosphere was then selected and impacted and secured with the locking screw    The proximal humerus was machined up to accept the trial stem selecting the 135° angle  A trial reduction was carried out during appropriate soft tissue tension range of motion and stability  The trial humeral stem was then removed and after placement of 2 #5 Ethibond sutures through drill holes and the final stem and cup was impacted into the proximal humerus  The shoulder was reduced for the last time and found to be satisfactory for range of motion and stability  The wound was copiously irrigated with pulse lavage followed by repair of the subscapularis utilizing the previously placed #5 Ethibond sutures  The deltoid and pectoralis was allowed to fall closed  Subcutaneous closure was then carried out utilizing interrupted 2-0 Vicryl sutures followed by 3-0 Monocryl as a subcuticular stitch and Steri-Strips  A soft absorbent bandage and shoulder immobilizer was then applied  The patient was then transferred to a stretcher in the supine position  There were no complications  Throughout the procedure, assistance by Tolu Will PA-C was required  She was required to aid in positioning the patient preoperatively and intraoperatively she was required to manipulate the patient's right upper extremity as well as various retractors and other equipment under my guidance  On completion of procedure, she was required to aid in closure of the wound and application of bandage  She was also required to aid in transfer the patient to recovery  X-ray: An AP view of the right shoulder was obtained in recovery  This demonstrated appropriate positioning of the total shoulder arthroplasty components  There was no evidence loosening or failure  There was air in the soft tissues consistent with immediate postoperative status the radiographs         Naif Morris MD    Date: 8/25/2020  Time: 1:14 PM

## 2020-08-25 NOTE — ANESTHESIA PREPROCEDURE EVALUATION
Procedure:  ARTHROPLASTY SHOULDER REVERSE (Right Shoulder)    Relevant Problems   CARDIO   (+) Hypertension      Other   (+) Disease of thyroid gland   (+) Dry eye   (+) Wears partial dentures        Physical Exam    Airway    Mallampati score: II  TM Distance: >3 FB  Neck ROM: full     Dental   Comment: Partial upper (out),     Cardiovascular  Rhythm: regular, Rate: normal, Cardiovascular exam normal    Pulmonary  Pulmonary exam normal Breath sounds clear to auscultation,     Other Findings        Anesthesia Plan  ASA Score- 2     Anesthesia Type- general with ASA Monitors  Additional Monitors:   Airway Plan:     Comment: ISB preop for postop pain control          Plan Factors-    Chart reviewed  Patient summary reviewed  Patient is not a current smoker  Patient not instructed to abstain from smoking on day of procedure  Patient did not smoke on day of surgery  Induction- intravenous  Postoperative Plan- Plan for postoperative opioid use  Informed Consent- Anesthetic plan and risks discussed with spouse and patient

## 2020-08-25 NOTE — INTERVAL H&P NOTE
H&P reviewed  After examining the patient I find no changes in the patients condition since the H&P had been written      Vitals:    08/25/20 0927   BP: 166/81   Pulse: 98   Resp: 16   Temp: 97 6 °F (36 4 °C)   SpO2: 96%

## 2020-08-26 VITALS
RESPIRATION RATE: 18 BRPM | SYSTOLIC BLOOD PRESSURE: 124 MMHG | DIASTOLIC BLOOD PRESSURE: 60 MMHG | WEIGHT: 119 LBS | OXYGEN SATURATION: 98 % | TEMPERATURE: 98.7 F | HEART RATE: 76 BPM | HEIGHT: 63 IN | BODY MASS INDEX: 21.09 KG/M2

## 2020-08-26 LAB
ANION GAP SERPL CALCULATED.3IONS-SCNC: 7 MMOL/L (ref 4–13)
BUN SERPL-MCNC: 6 MG/DL (ref 5–25)
CALCIUM SERPL-MCNC: 8.5 MG/DL (ref 8.3–10.1)
CHLORIDE SERPL-SCNC: 98 MMOL/L (ref 100–108)
CO2 SERPL-SCNC: 27 MMOL/L (ref 21–32)
CREAT SERPL-MCNC: 0.67 MG/DL (ref 0.6–1.3)
GFR SERPL CREATININE-BSD FRML MDRD: 84 ML/MIN/1.73SQ M
GLUCOSE SERPL-MCNC: 117 MG/DL (ref 65–140)
POTASSIUM SERPL-SCNC: 3.6 MMOL/L (ref 3.5–5.3)
SODIUM SERPL-SCNC: 132 MMOL/L (ref 136–145)

## 2020-08-26 PROCEDURE — 97530 THERAPEUTIC ACTIVITIES: CPT

## 2020-08-26 PROCEDURE — 97166 OT EVAL MOD COMPLEX 45 MIN: CPT

## 2020-08-26 PROCEDURE — 97535 SELF CARE MNGMENT TRAINING: CPT

## 2020-08-26 PROCEDURE — 80048 BASIC METABOLIC PNL TOTAL CA: CPT | Performed by: PHYSICIAN ASSISTANT

## 2020-08-26 PROCEDURE — 97163 PT EVAL HIGH COMPLEX 45 MIN: CPT

## 2020-08-26 RX ORDER — HYDROMORPHONE HCL/PF 1 MG/ML
0.5 SYRINGE (ML) INJECTION EVERY 2 HOUR PRN
Status: DISCONTINUED | OUTPATIENT
Start: 2020-08-26 | End: 2020-08-26 | Stop reason: HOSPADM

## 2020-08-26 RX ADMIN — HYDROMORPHONE HYDROCHLORIDE 0.5 MG: 1 INJECTION, SOLUTION INTRAMUSCULAR; INTRAVENOUS; SUBCUTANEOUS at 05:33

## 2020-08-26 RX ADMIN — ACETAMINOPHEN 650 MG: 325 TABLET ORAL at 08:53

## 2020-08-26 RX ADMIN — ASPIRIN 325 MG ORAL TABLET 325 MG: 325 PILL ORAL at 08:53

## 2020-08-26 RX ADMIN — SODIUM CHLORIDE, SODIUM LACTATE, POTASSIUM CHLORIDE, AND CALCIUM CHLORIDE 100 ML/HR: .6; .31; .03; .02 INJECTION, SOLUTION INTRAVENOUS at 04:18

## 2020-08-26 RX ADMIN — HYDROMORPHONE HYDROCHLORIDE 0.5 MG: 1 INJECTION, SOLUTION INTRAMUSCULAR; INTRAVENOUS; SUBCUTANEOUS at 01:43

## 2020-08-26 RX ADMIN — OXYCODONE HYDROCHLORIDE 10 MG: 10 TABLET ORAL at 09:57

## 2020-08-26 RX ADMIN — LISINOPRIL 40 MG: 20 TABLET ORAL at 08:53

## 2020-08-26 RX ADMIN — CEFAZOLIN SODIUM 1000 MG: 1 SOLUTION INTRAVENOUS at 02:45

## 2020-08-26 RX ADMIN — SENNOSIDES 8.6 MG: 8.6 TABLET, FILM COATED ORAL at 08:53

## 2020-08-26 RX ADMIN — OXYCODONE HYDROCHLORIDE 10 MG: 10 TABLET ORAL at 04:13

## 2020-08-26 RX ADMIN — DOCUSATE SODIUM 100 MG: 100 CAPSULE, LIQUID FILLED ORAL at 08:53

## 2020-08-26 RX ADMIN — ACETAMINOPHEN 650 MG: 325 TABLET ORAL at 14:29

## 2020-08-26 NOTE — SOCIAL WORK
LOS: 1 GMLOS: N/A RISK OF READMISSION SCORE: 6     CM met with pt at bedside to discuss discharge needs  Pt lives in a one level house with her spouse  ADL's are completed independently with no DME use  Pt does own a RW and has a bench in her shower  Pt requesting a BSC to use as bars around her toilet  CM will place an order with [facility]  Pt prefers to start with home therapy  CM will place a referral to [facility]  Spouse will transport home at discharge  No other needs expressed or identified  CM will continue to follow as needed  A post acute care recommendation was made by your care team for [facility]  Discussed [place] of Choice with patient  List of agencies given to patient via in person  patient aware the list is custom filtered for them by preference  and that [facility] post acute providers are designated

## 2020-08-26 NOTE — UTILIZATION REVIEW
Initial Clinical Review    Elective Inpatient surgical procedure    Age/Sex: 68 y o  female     Surgery Date: 8/25/20      Procedure: Right Reverse Total Shoulder Replacement       Anesthesia: General and peripheral block      Operative Findings:     Implants:   Implant Name Type Inv  Item Serial No   Lot No  LRB No  Used Action   BASEPLATE 88ER MONOBLOCK POST - [id number]   BASEPLATE 63FT MONOBLOCK POST   [employer] [id number] Right 1 Implanted   SCREW PERIPHERAL 4 5 X 16MM NON LCK - [id number]   SCREW PERIPHERAL 4 5 X 16MM NON LCK   [employer] [id number] Right 1 Implanted   SCREW PERIPHERAL 5 5 X 32MM LCK - [id number]   SCREW PERIPHERAL 5 5 X 32MM LCK   [employer] [id number] Right 1 Implanted   Arthrex univers revers modular glenoid system glenosphere, 33+4 LAT/24       [employer] [id number] Right 1 Implanted   SCREW PERIPHERAL 5 5 X 16MM LCK - [id number]   SCREW PERIPHERAL 5 5 X 16MM LCK   [employer] [id number] Right 1 Implanted   SCREW PERIPHERAL 5 5 X 40MM LCK - [id number]   SCREW PERIPHERAL 5 5 X 40MM LCK   [employer] [id number] Right 1 Implanted   CUP SUTURE 36 NEUTRAL UNIVERS REVERS - [id number]   CUP SUTURE 36 NEUTRAL UNIVERS REVERS   [employer] [id number] Right 1 Implanted   STEM HUM SZ 5 UNIVERS REVERS - [id number]   STEM HUM SZ 5 UNIVERS REVERS   [employer] [id number] Right 1 Implanted   UNIVERS MODULAR GLENOID COMBO HUMERAL INSERT 36 +3 / 33       [employer] [id number] Right 1 Implanted         8/26 POD#1 RIGHT total Reverse shoulder arthroplasty:  Neurvascular intact Axillary, Radial, Ulnar and Median Nerves  Radial pulse Plus 2  Sling well fitting  Pain will controlled with oral medications  OOB as tolerated  Physical Therapy       Admission Orders: Date/Time/Statement:   Admission Orders (From admission, onward)     Ordered        08/25/20 1154  Inpatient Admission  Once                   Orders Placed This Encounter   Procedures    Inpatient Admission          Standing Status:   Standing Number of Occurrences:   1     Order Specific Question:   Admitting Physician     Answer:   Yvonne Street     Order Specific Question:   Level of Care     Answer:   Med Surg [16]     Order Specific Question:   Estimated length of stay     Answer:   Inpatient Only Surgery     Vital Signs:      Time   Temp   Pulse   Resp   BP   SpO2   Calculated FIO2 (%) - Nasal Cannula   Nasal Cannula O2 Flow Rate (L/min)   O2 Device   Cardiac (WDL)   Patient Position - Orthostatic VS    08/26/20 1115   98 7 °F (37 1 °C)   76   18   124/60   98 %         None (Room air)      Lying    08/26/20 0825   98 3 °F (36 8 °C)   76   18   141/74   99 %         None (Room air)      Lying    08/26/20 0239   97 8 °F (36 6 °C)   75   20   126/60   98 %         None (Room air)      Lying    08/25/20 2300   97 6 °F (36 4 °C)   78   18   142/78   97 %         None (Room air)      Lying    08/25/20 1900   97 7 °F (36 5 °C)   73   18   142/78   97 %         None (Room air)      Lying    08/25/20 1700      66      144/68   96 %                   08/25/20 1633   97 6 °F (36 4 °C)   68      145/78   96 %                   08/25/20 1556            178/84                     08/25/20 1521   97 4 °F (36 3 °C)Abnormal     69   19   186/60  99 %         None (Room air)      Lying    08/25/20 1445      68   18   150/80   96 %         None (Room air)          08/25/20 1427   97 5 °F (36 4 °C)   70   22   153/82   99 %         None (Room air)          08/25/20 1422      74   18   162/79   99 %   28   2 L/min   Nasal cannula          08/25/20 1357      64   14   150/70   98 %   28   2 L/min   Nasal cannula          08/25/20 1342   97 5 °F (36 4 °C)   66   21   132/71   99 %   28   2 L/min   Nasal cannula   WDL       08/25/20 1118      66   16   137/63   100 %   28   2 L/min   Nasal cannula      Lying    08/25/20 1057      81   16   172/83  99 %         None (Room air)      Lying    08/25/20 1005    78      154/76                      08/25/20 0927   97 6 °F (36 4 °C)   98   16   166/81   96 %         None (Room air)                        Results from last 7 days   Lab Units 08/26/20  0424   SODIUM mmol/L 132*   POTASSIUM mmol/L 3 6   CHLORIDE mmol/L 98*   CO2 mmol/L 27   ANION GAP mmol/L 7   BUN mg/dL 6   CREATININE mg/dL 0 67   EGFR ml/min/1 73sq m 84   CALCIUM mg/dL 8 5             Results from last 7 days   Lab Units 08/26/20  0424   GLUCOSE RANDOM mg/dL 117               Diet: Regular      Mobility: OOB      DVT Prophylaxis: SCD      Medications/Pain Control:     Scheduled Medications:  aspirin, 325 mg, Oral, Daily  docusate sodium, 100 mg, Oral, BID  lisinopril, 40 mg, Oral, Daily  senna, 1 tablet, Oral, Daily      Continuous IV Infusions:    lactated ringers, 100 mL/hr, Intravenous, Continuous        PRN Meds:      acetaminophen, 650 mg, Oral, Q6H PRN  HYDROmorphone, 0 5 mg, Intravenous, Q2H PRN  HYDROmorphone, 0 5 mg, Intravenous, Q2H PRN  ketorolac, 15 mg, Intravenous, Q6H PRN  ondansetron, 4 mg, Intravenous, Q6H PRN  oxyCODONE, 10 mg, Oral, Q6H PRN  oxyCODONE, 5 mg, Oral, Q4H PRN            Network Utilization Review Department  [email or web address]  ATTENTION: Please call with any questions or concerns to [phone] and carefully listen to the prompts so that you are directed to the right person  All voicemails are confidential   [facility] all requests for admission clinical reviews, approved or denied determinations and any other requests to dedicated fax number below belonging to the campus where the patient is receiving treatment   List of dedicated fax numbers for the Facilities:  FACILITY NAME UR FAX NUMBER   ADMISSION DENIALS (Administrative/Medical Necessity) [phone]   [address] [place] [address] (Maternity/NICU/Pediatrics) [phone]   [facility] [address] [facility] [address] [facility] [phone]   [address] [facility] [address] [facility] [phone]   [address] [phone]   [address] [facility], [facility]  [phone] [address] [facility] 830 [facility] [phone]

## 2020-08-26 NOTE — PLAN OF CARE
Problem: PAIN - ADULT  Goal: Verbalizes/displays adequate comfort level or baseline comfort level  Description: Interventions:  - Encourage patient to monitor pain and request assistance  - Assess pain using appropriate pain scale  - Administer analgesics based on type and severity of pain and evaluate response  - Implement non-pharmacological measures as appropriate and evaluate response  - Consider cultural and social influences on pain and pain management  - Notify physician/advanced practitioner if interventions unsuccessful or patient reports new pain  8/26/2020 1441 by Nicolas Sesay RN  Outcome: Progressing  8/26/2020 1049 by Nicolas Sesay RN  Outcome: Progressing     Problem: INFECTION - ADULT  Goal: Absence or prevention of progression during hospitalization  Description: INTERVENTIONS:  - Assess and monitor for signs and symptoms of infection  - Monitor lab/diagnostic results  - Monitor all insertion sites, i e  indwelling lines, tubes, and drains  - Monitor endotracheal if appropriate and nasal secretions for changes in amount and color  - [place] appropriate cooling/warming therapies per order  - Administer medications as ordered  - Instruct and encourage patient and family to use good hand hygiene technique  - Identify and instruct in appropriate isolation precautions for identified infection/condition  8/26/2020 1441 by Nicolas Sesay RN  Outcome: Progressing  8/26/2020 1049 by Nicolas Sesay RN  Outcome: Progressing     Problem: SAFETY ADULT  Goal: Patient will remain free of falls  Description: INTERVENTIONS:  - Assess patient frequently for physical needs  -  Identify cognitive and physical deficits and behaviors that affect risk of falls    -  [place] fall precautions as indicated by assessment   - Educate patient/family on patient safety including physical limitations  - Instruct patient to call for assistance with activity based on assessment  - Modify environment to reduce risk of injury  - Consider OT/PT consult to assist with strengthening/mobility  8/26/2020 1441 by China Flores RN  Outcome: Progressing  8/26/2020 1049 by China Flores RN  Outcome: Progressing  Goal: Maintain or return to baseline ADL function  Description: INTERVENTIONS:  -  Assess patient's ability to carry out ADLs; assess patient's baseline for ADL function and identify physical deficits which impact ability to perform ADLs (bathing, care of mouth/teeth, toileting, grooming, dressing, etc )  - Assess/evaluate cause of self-care deficits   - Assess range of motion  - Assess patient's mobility; develop plan if impaired  - Assess patient's need for assistive devices and provide as appropriate  - Encourage maximum independence but intervene and supervise when necessary  - Involve family in performance of ADLs  - Assess for home care needs following discharge   - Consider OT consult to assist with ADL evaluation and planning for discharge  - Provide patient education as appropriate  8/26/2020 1441 by China Flores RN  Outcome: Progressing  8/26/2020 1049 by China Flores RN  Outcome: Progressing  Goal: Maintain or return mobility status to optimal level  Description: INTERVENTIONS:  - Assess patient's baseline mobility status (ambulation, transfers, stairs, etc )    - Identify cognitive and physical deficits and behaviors that affect mobility  - Identify mobility aids required to assist with transfers and/or ambulation (gait belt, sit-to-stand, lift, walker, cane, etc )  - [place] fall precautions as indicated by assessment  - Record patient progress and toleration of activity level on Mobility SBAR; progress patient to next Phase/Stage  - Instruct patient to call for assistance with activity based on assessment  - Consider rehabilitation consult to assist with strengthening/weightbearing, etc   8/26/2020 1441 by China Flores RN  Outcome: Progressing  8/26/2020 1049 by China Flores RN  Outcome: Progressing     Problem: DISCHARGE PLANNING  Goal: Discharge to home or other facility with appropriate resources  Description: INTERVENTIONS:  - Identify barriers to discharge w/patient and caregiver  - Arrange for needed discharge resources and transportation as appropriate  - Identify discharge learning needs (meds, wound care, etc )  - Arrange for interpretive services to assist at discharge as needed  - Refer to Case Management Department for coordinating discharge planning if the patient needs post-hospital services based on physician/advanced practitioner order or complex needs related to functional status, cognitive ability, or social support system  8/26/2020 1441 by Salvador Javed RN  Outcome: Progressing  8/26/2020 1049 by Salvador Javed RN  Outcome: Progressing     Problem: Knowledge Deficit  Goal: Patient/family/caregiver demonstrates understanding of disease process, treatment plan, medications, and discharge instructions  Description: Complete learning assessment and assess knowledge base    Interventions:  - Provide teaching at level of understanding  - Provide teaching via preferred learning methods  8/26/2020 1441 by Salvador Javed RN  Outcome: Progressing  8/26/2020 1049 by Salvador Javed RN  Outcome: Progressing

## 2020-08-26 NOTE — PHYSICAL THERAPY NOTE
PT EVALUATION (09:05-09:28= 23mins)    Pt  Name: Shreyas Paniagua  Pt  Age: 68 y o  MRN: [id number]  LENGTH OF STAY: 1    Patient Active Problem List   Diagnosis    Wears partial dentures    Hypertension    Dry eye    Disease of thyroid gland    Rotator cuff tear arthropathy, right       Admitting Diagnoses:   Primary osteoarthritis, right shoulder [M19 011]  Pain in right shoulder [M25 511]    Past Medical History:   Diagnosis Date    Arthritis     Disease of thyroid gland     thyroid nodules    Dry eye     Hard of hearing     Hematuria     Hypertension     Leaking of urine     Osteoarthritis     Peritonitis ([address] )     in age 44's due to ruptured apendix    Shoulder joint pain     right- reverse arthroplasty today 8/25/2020    Wears glasses     Wears partial dentures     upper       Past Surgical History:   Procedure Laterality Date    APPENDECTOMY      CHOLECYSTECTOMY LAPAROSCOPIC      COLONOSCOPY      HIP SURGERY      JOINT REPLACEMENT Right     hip    LAPAROSCOPIC CHOLECYSTECTOMY      LAPAROSCOPY      ME RECONSTR TOTAL SHOULDER IMPLANT Right 8/25/2020    Procedure: ARTHROPLASTY SHOULDER REVERSE;  Surgeon: Nereyda Sorensen MD;  Location: [facility];  Service: Orthopedics    TONSILLECTOMY      TUBAL LIGATION      WISDOM TOOTH EXTRACTION         Imaging Studies:  XR shoulder right 1 view   Final Result by Lizzie Monge MD (08/26 2013)      Unremarkable appearance of reverse total shoulder arthroplasty  Workstation performed: [id number]              08/26/20 0943   Note Type   Note type Eval/Treat   Pain Assessment   Pain Score 4   Pain Location/Orientation Orientation: Right;Location: Shoulder   Hospital Pain Intervention(s) Medication (See MAR); Cold applied;Repositioned; Ambulation/increased activity; Emotional support; Rest   Home Living   Type of [facility] [address] One level  (1STE)   Bathroom Shower/Tub Walk-in shower   Bathroom Toilet Raised   Bathroom Equipment Built-in shower seat   Home Equipment Walker;Cane  (does not use at baseline)   Prior Function   Level of [place] Independent with ADLs and functional mobility  (w/o AD)   Lives With Spouse   Receives Help From Family   ADL Assistance Independent   Falls in the last 6 months 0   Vocational Retired   Comments (+)    Restrictions/Precautions   Wells Trice Bearing Precautions Per Order Yes   RUE [place] Trice Bearing Per Order NWB   Braces or Orthoses Sling  (w/ abductor pillow)   Other Precautions Multiple lines; Fall Risk;Pain  (TSR protocol)   General   Family/Caregiver Present No   Cognition   Overall Cognitive Status WFL   Arousal/Participation Alert   Orientation Level Oriented X4   Following Commands Follows one step commands without difficulty   RUE Assessment   RUE Assessment   (refer to OT)   LUE Assessment   LUE Assessment   (refer to OT)   RLE Assessment   RLE Assessment WFL  (4/5 grossly)   LLE Assessment   LLE Assessment WFL  (4/5 grossly)   Coordination   Movements are Fluid and Coordinated 1   Sensation WFL   Bed Mobility   Supine to Sit 5  Supervision   Additional items HOB elevated; Increased time required;Verbal cues   Sit to Supine 5  Supervision   Additional items Increased time required;Verbal cues   Transfers   Sit to Stand 5  Supervision   Additional items Armrests; Increased time required;Verbal cues   Stand to Sit 5  Supervision   Additional items Armrests; Increased time required;Verbal cues   Additional Comments cues for techniques & safety   Ambulation/Elevation   Gait pattern Decreased foot clearance; Excessively slow;Narrow SHAHLA  (guarded)   Gait Assistance 4  Minimal assist  (CGAx1)   Additional items Assist x 1;Verbal cues; Tactile cues   Assistive Device None; Other (Comment)  (pt prefers to hold onto IV pole)   Distance 15'x2   Balance   Static Sitting Good   Static Standing Fair +   Ambulatory Fair   Endurance Deficit   Endurance Deficit Yes   Endurance Deficit Description pain; fatigue Activity Tolerance   Activity Tolerance Patient limited by fatigue;Patient limited by pain   Medical Staff Made Aware GIANLUCA Sykes   Nurse Made Aware DENISE Gallego   Assessment   Prognosis Good   Problem List Decreased strength;Decreased range of motion;Decreased endurance; Impaired balance;Decreased mobility;Pain;Orthopedic restrictions   Assessment Pt  68 y  o female admitted for elective R Reverse total shoulder arthroplasty on 8/25/20 2* to R shoulder DJD & unrepairable Rotator cuff tear arthropathy, right  Pt referred to PT for mobility assessment & D/C planning w/ orders  PTA, pt reports being I w/o AD  Per Ortho, (+) TSR protocol; Sling x 4 weeks, PT - Max ER 30 degrees, No Active IR, AAROM FF to 100 degrees  On eval, pt demonstrate dec mobility, balance, endurance & amb  Pt require S for bed mobility & transfers; CGAx1 for amb + cues for techniques  Gait deviations as above, slow & guarded but no gross LOB noted  Pt prefers to hold onto IV pole for support  No dizziness reported t/o session  Pt instructed on TSR protocol, sling application & RUE thera  ex w/ good understanding  Pt anxious t/o session  Please see PT progress note below for details  Nsg staff most recent vital signs as follows: /74 (BP Location: Left arm)   Pulse 76   Temp 98 3 °F (36 8 °C) (Temporal)   Resp 18   Ht 5' 3" (1 6 m)   Wt 54 kg (119 lb)   SpO2 99%   BMI 21 08 kg/m²   At end of session, pt back in bed w/o issues, call bell & phone in reach  Fall precautions reinforced w/ good understanding  Will continue skilled PT to improve function & safety  At this time, will anticipate good progress in PT for safe D/C to home  Will recommend HHPT (pt prefers HHPT over OPPT) & family support at D/C  CM to follow  Nsg staff to continue to mobilized pt (OOB in chair for all meals & ambulate in room/unit) as tolerated to prevent further decline in function  Nsg notified      Barriers to Discharge None   Goals   Patient Goals to have less pain STG Expiration Date 09/02/20   Short Term Goal #1 Goals to be met in 7 days; pt will be able to: 1) inc strength & balance by 1/2 grade to improve overall functional mobility & dec fall risk; 2) inc bed mobility to modified I for pt to be able to get in/OOB safely w/ proper techniques 100% of the time, to dec caregiver burden & safely function at home; 3) inc transfers to modified I for pt to transition safely from one surface to another w/o % of the time, to dec caregiver burden & safely function at home; 4) inc amb w/ appropriate AD approx  >80' w/ I/modified I for pt to ambulate household distances w/o any % of the time, to dec caregiver burden & safely function at home; 5) inc barthel score to 80 to decrease overall risk for falls; 6) negotiate stairs w/ modified I for inc safety during stair mgt inside/outside of home & dec caregiver burden; 7) pt/caregiver ed   PT Treatment Day 1   Plan   Treatment/Interventions Functional transfer training;LE strengthening/ROM; Therapeutic exercise;Elevations; Endurance training;Patient/family training;Bed mobility;Gait training;Spoke to nursing;OT   PT Frequency 5x/wk   Recommendation   PT Discharge Recommendation Home with skilled therapy  (HHPT (pt prefers HHPT over OPPT) w/ family support)   PT - OK to Discharge Yes  (when medically cleared)   Barthel Index   Feeding 5   Bathing 0   Grooming Score 5   Dressing Score 5   Bladder Score 10   Bowels Score 10   Toilet Use Score 5   Transfers (Bed/Chair) Score 10   Mobility (Level Surface) Score 0   Stairs Score 0   Barthel Index Score 50   Hx/personal factors: co-morbidities, advanced age, mutliple lines, pain, WB restrictions, fall risk and assist w/ ADL's  Examination: dec mobility, dec balance, dec endurance, dec amb, moderate fall risk, pain, WB restrictions  Clinical: unpredictable (ongoing medical status, abnormal lab values, moderate fall risk and pain mgt)  Complexity: high     PT TREATMENT NOTE:    TIME IN: 09:28  TIME OUT: 09:43  TOTAL TIME: 15mins    Pt instructed on sling application require minAx1 to don/doff sling  Pt instructed on RUE thera  ex including pendulum ex as well as R elbow/wrist/hand AROM ex  Pt tolerated thera  ex fairly  Noted slight lightheadedness w/ pendulum ex 2* to forward flexed position but relieved w/ rest  Pt instructed on general TSR protocol w/ good understanding  Written handout given  Pt able to ambulate further approx  40'x1 w/ CGAx1 w/o AD  Gait still slow & guarded but no gross LOB noted  Pt assisted back in bed at end of session per pt request  Will continue PT per POC  Will recommend HHPT & family support at D/C       Vijay Sanchez PT

## 2020-08-26 NOTE — PROGRESS NOTES
Orthopedic Reverse Total Shoulder Progress Note  ([facility] - Dr Augustin Otto)    SUBJECTIVE:  Post-Operative Day:  LOS: 1 day   RIGHT total Reverse shoulder arthroplasty  Systemic or Specific Complaints: Patient sitting up in bed  Medications covering pain  Pt has been OOB and to bathroom on own  OBJECTIVE:  Vital signs in last 24 hours:  Temp:  [97 4 °F (36 3 °C)-97 8 °F (36 6 °C)] 97 8 °F (36 6 °C)  HR:  [64-98] 75  Resp:  [14-22] 20  BP: (126-186)/(60-84) 126/60  General: Alert, appears stated age,cooperative   Neurovascular: Neurvascular intact Axillary, Radial, Ulnar and Median Nerves  Radial pulse Plus 2  Sling well fitting   Wound: Incision looks good, Minimal drainage from wound  Range of Motion: Patient in a sling and well fitting     Data Review:  CBC:   Lab Results   Component Value Date    WBC 5 09 08/11/2020    RBC 4 17 08/11/2020    HGB 13 5 08/11/2020    HCT 39 6 08/11/2020     08/11/2020       ASSESSMENT & PLAN:  Status post- RIGHT total shoulder arthroplasty:   Pain Relief: Pain well controled with oral medications  Continues current post-op course    Activity: OOB as tolerates  Follow TSR ROM protocol  Sling x 4 weeks  PT - Max ER 30 degrees, No Active IR, AAROM FF to 100 degrees  D/C Home with outpatient PT  Please help arrange if not already made  DVT prophylaxis  Follow up in office in 3 weeks  Appointment should already be made         Tanmay Sotelo PA-C  Date: 8/26/2020  Time: 6:39 AM

## 2020-08-26 NOTE — PLAN OF CARE
Problem: PAIN - ADULT  Goal: Verbalizes/displays adequate comfort level or baseline comfort level  Description: Interventions:  - Encourage patient to monitor pain and request assistance  - Assess pain using appropriate pain scale  - Administer analgesics based on type and severity of pain and evaluate response  - Implement non-pharmacological measures as appropriate and evaluate response  - Consider cultural and social influences on pain and pain management  - Notify physician/advanced practitioner if interventions unsuccessful or patient reports new pain  Outcome: Progressing     Problem: INFECTION - ADULT  Goal: Absence or prevention of progression during hospitalization  Description: INTERVENTIONS:  - Assess and monitor for signs and symptoms of infection  - Monitor lab/diagnostic results  - Monitor all insertion sites, i e  indwelling lines, tubes, and drains  - Monitor endotracheal if appropriate and nasal secretions for changes in amount and color  - [place] appropriate cooling/warming therapies per order  - Administer medications as ordered  - Instruct and encourage patient and family to use good hand hygiene technique  - Identify and instruct in appropriate isolation precautions for identified infection/condition  Outcome: Progressing     Problem: SAFETY ADULT  Goal: Patient will remain free of falls  Description: INTERVENTIONS:  - Assess patient frequently for physical needs  -  Identify cognitive and physical deficits and behaviors that affect risk of falls    -  [place] fall precautions as indicated by assessment   - Educate patient/family on patient safety including physical limitations  - Instruct patient to call for assistance with activity based on assessment  - Modify environment to reduce risk of injury  - Consider OT/PT consult to assist with strengthening/mobility  Outcome: Progressing  Goal: Maintain or return to baseline ADL function  Description: INTERVENTIONS:  -  Assess patient's ability to carry out ADLs; assess patient's baseline for ADL function and identify physical deficits which impact ability to perform ADLs (bathing, care of mouth/teeth, toileting, grooming, dressing, etc )  - Assess/evaluate cause of self-care deficits   - Assess range of motion  - Assess patient's mobility; develop plan if impaired  - Assess patient's need for assistive devices and provide as appropriate  - Encourage maximum independence but intervene and supervise when necessary  - Involve family in performance of ADLs  - Assess for home care needs following discharge   - Consider OT consult to assist with ADL evaluation and planning for discharge  - Provide patient education as appropriate  Outcome: Progressing  Goal: Maintain or return mobility status to optimal level  Description: INTERVENTIONS:  - Assess patient's baseline mobility status (ambulation, transfers, stairs, etc )    - Identify cognitive and physical deficits and behaviors that affect mobility  - Identify mobility aids required to assist with transfers and/or ambulation (gait belt, sit-to-stand, lift, walker, cane, etc )  - [place] fall precautions as indicated by assessment  - Record patient progress and toleration of activity level on Mobility SBAR; progress patient to next Phase/Stage  - Instruct patient to call for assistance with activity based on assessment  - Consider rehabilitation consult to assist with strengthening/weightbearing, etc   Outcome: Progressing     Problem: DISCHARGE PLANNING  Goal: Discharge to home or other facility with appropriate resources  Description: INTERVENTIONS:  - Identify barriers to discharge w/patient and caregiver  - Arrange for needed discharge resources and transportation as appropriate  - Identify discharge learning needs (meds, wound care, etc )  - Arrange for interpretive services to assist at discharge as needed  - Refer to Case Management Department for coordinating discharge planning if the patient needs post-hospital services based on physician/advanced practitioner order or complex needs related to functional status, cognitive ability, or social support system  Outcome: Progressing     Problem: Knowledge Deficit  Goal: Patient/family/caregiver demonstrates understanding of disease process, treatment plan, medications, and discharge instructions  Description: Complete learning assessment and assess knowledge base    Interventions:  - Provide teaching at level of understanding  - Provide teaching via preferred learning methods  Outcome: Progressing

## 2020-08-26 NOTE — PLAN OF CARE
Problem: OCCUPATIONAL THERAPY ADULT  Goal: Performs self-care activities at highest level of function for planned discharge setting  See evaluation for individualized goals  Description: Treatment Interventions: ADL retraining, Functional transfer training, UE strengthening/ROM, Endurance training, Patient/family training, Equipment evaluation/education, Compensatory technique education, Activityengagement          See flowsheet documentation for full assessment, interventions and recommendations  Note: Limitation: Decreased ADL status, Decreased UE ROM, Decreased UE strength, Decreased endurance, Decreased self-care trans, Decreased high-level ADLs  Prognosis: Good  Assessment: Pt is a 68 y o  female seen for OT evaluation s/p adm to [facility] on 8/25/2020 s/p R total reverse shoulder arthroplasty  Pt w/ the following post-op restrictions: NWB R UE, Sling x4 weeks, Max ER 30*, No Active IR, AAROM FF to 100*  Comorbidities affecting pts functional performance include a significant PMH of Arthritis, Hematuria, HTN, OA, and R total hip arthroplasty  Pt with active OT orders  Pt lives with spouse in a one level house with 1 RERE  At baseline, pt was I w/ ADLs, IADLs, and functional mobility w/o use of AD, (+) , and denies fall PTA  Upon evaluation, pt currently requires Min A for UB ADLs, Min A for LB ADLs, Min A for toileting, Supervision for bed mobility, Supervision for transfers, and Min A for functional mobility 2* the following deficits impacting occupational performance: weakness, decreased ROM, decreased strength, decreased balance, decreased tolerance and increased pain  These impairments, as well at pts steps to enter environment, difficulty performing ADLS and difficulty performing IADLS  limit pts ability to safely engage in all baseline areas of occupation  Pt scored overall 45/100 on the Barthel Index   Based on the aforementioned OT evaluation, functional performance deficits, and assessments, pt has been identified as a Moderate complexity evaluation  Pt to continue to benefit from continued acute OT services during hospital stay to address defined deficits and to maximize level of functional independence in the following Occupational Performance areas: grooming, bathing/shower, toilet hygiene, dressing, medication management, health maintenance, functional mobility, community mobility, clothing management, cleaning, meal prep and household maintenance  From OT standpoint, recommend Home OT upon D/C   OT will continue to follow pt 3-5x/wk to address the following goals to  w/in 7-10 days:     OT Discharge Recommendation: Home with skilled therapy  OT - OK to Discharge: Yes(when medically cleared)

## 2020-08-26 NOTE — OCCUPATIONAL THERAPY NOTE
Occupational Therapy Evaluation     Patient Name: Ila Gutierrez  DIPDB'U Date: 8/26/2020  Problem List  Principal Problem:    Rotator cuff tear arthropathy, right  Active Problems:    Wears partial dentures    Hypertension    Dry eye    Disease of thyroid gland    Past Medical History  Past Medical History:   Diagnosis Date    Arthritis     Disease of thyroid gland     thyroid nodules    Dry eye     Hard of hearing     Hematuria     Hypertension     Leaking of urine     Osteoarthritis     Peritonitis ([address] )     in age 44's due to ruptured apendix    Shoulder joint pain     right- reverse arthroplasty today 8/25/2020    Wears glasses     Wears partial dentures     upper     Past Surgical History  Past Surgical History:   Procedure Laterality Date    APPENDECTOMY      CHOLECYSTECTOMY LAPAROSCOPIC      COLONOSCOPY      HIP SURGERY      JOINT REPLACEMENT Right     hip    LAPAROSCOPIC CHOLECYSTECTOMY      LAPAROSCOPY      AR RECONSTR TOTAL SHOULDER IMPLANT Right 8/25/2020    Procedure: ARTHROPLASTY SHOULDER REVERSE;  Surgeon: Sukhi Hubbard MD;  Location: [facility];  Service: Orthopedics    TONSILLECTOMY      TUBAL LIGATION      WISDOM TOOTH EXTRACTION             08/26/20 0942   Note Type   Note type Eval/Treat   Restrictions/Precautions   Weight Bearing Precautions Per Order Yes   RUE Weight Bearing Per Order NWB   Braces or Orthoses Sling  (w/ abductor pillow; R UE)   Other Precautions Multiple lines;WBS;Fall Risk;Pain  (Max ER 30*, No Active IR, AAROM FF to 100*)   Pain Assessment   Pain Assessment Tool 0-10   Pain Score 4   Pain Location/Orientation Orientation: Right;Location: Shoulder   Hospital Pain Intervention(s) Repositioned; Ambulation/increased activity; Emotional support; Rest   Multiple Pain Sites No   Home Living   Type of Home House   Home Layout One level  (1 RERE)   Bathroom Shower/Tub Walk-in shower   Bathroom Toilet Raised   Bathroom Equipment Built-in shower seat   Bathroom Accessibility Accessible   Home Equipment Walker;Cane   Additional Comments Pt lives with spouse in a one level house with 1 RERE  Pt reports spouse will be home at D/C to assist as needed   Prior Function   Level of [place] Independent with ADLs and functional mobility   Lives With Spouse   Receives Help From Family   ADL Assistance Independent   IADLs Independent   Falls in the last 6 months 0   Vocational Retired   Comments At baseline, pt was I w/ ADLs, IADLs, and functional mobility w/o use of AD, (+) , and denies fall PTA   Lifestyle   Autonomy At baseline, pt was I w/ ADLs, IADLs, and functional mobility w/o use of AD, (+) , and denies fall PTA   Reciprocal Relationships Spouse   Service to Others Retired   Intrinsic Gratification Going for walks   Psychosocial   Psychosocial (WDL) WDL   ADL   Eating Assistance 5  Supervision/Setup   Grooming Assistance 4  [address] [place] [address] 4  Minimal Assistance   Bed Mobility   Supine to [address]   Additional items HOB elevated; Bedrails; Increased time required;Verbal cues   Sit to Supine 5  Supervision   Additional items Increased time required   Additional Comments Pt lying supine at end of session w/ call bell and phone within reach  All needs met and pt reports no further questions for OT at this time   Transfers   Sit to Stand 5  Supervision   Additional items Armrests; Increased time required;Verbal cues   Stand to Sit 5  Supervision   Additional items Armrests; Increased time required;Verbal cues   Toilet transfer 5  Supervision   Additional items Increased time required;Verbal cues;Standard toilet   Additional Comments Cues for safe technique and hand placement   Functional Mobility Functional Mobility 4  Minimal assistance   Additional Comments Assist x1 w/ use of IV pole for L UE support   Balance   Static Sitting Good   Dynamic Sitting Fair +   Static Standing Fair +   Dynamic Standing Fair   Ambulatory Fair   Activity Tolerance   Activity Tolerance Patient limited by fatigue;Patient limited by pain   Medical Staff Made Aware [place], PT   Nurse Made Aware yes; Brandi Vega RN   RUE Assessment   RUE Assessment X  (sling donned)   LUE Assessment   LUE Assessment WFL   LUE Strength   LUE Overall Strength Within Functional Limits - able to perform ADL tasks with strength  (4/5 throughout)   Hand Function   Gross Motor Coordination Functional   Fine Motor Coordination Functional   Sensation   Light Touch No apparent deficits   Proprioception   Proprioception No apparent deficits   Vision - Complex Assessment   Ocular Range of Motion WFL   Acuity Able to read clock/calendar on wall without difficulty   Perception   Inattention/Neglect Appears intact   Cognition   Overall Cognitive Status [facility]   Arousal/Participation Alert; Cooperative   Attention Within functional limits   Orientation Level Oriented X4   Memory Within functional limits   Following Commands Follows one step commands without difficulty   Comments Pleasant and cooperative   Assessment   Limitation Decreased ADL status; Decreased UE ROM; Decreased UE strength;Decreased endurance;Decreased self-care trans;Decreased high-level ADLs   Prognosis Good   Assessment Pt is a 68 y o  female seen for OT evaluation s/p adm to [facility] on 8/25/2020 s/p R total reverse shoulder arthroplasty  Pt w/ the following post-op restrictions: NWB R UE, Sling x4 weeks, Max ER 30*, No Active IR, AAROM FF to 100*  Comorbidities affecting pts functional performance include a significant PMH of Arthritis, Hematuria, HTN, OA, and R total hip arthroplasty  Pt with active OT orders  Pt lives with spouse in a one level house with 1 RERE   At baseline, pt was I w/ ADLs, IADLs, and functional mobility w/o use of AD, (+) , and denies fall PTA  Upon evaluation, pt currently requires Min A for UB ADLs, Min A for LB ADLs, Min A for toileting, Supervision for bed mobility, Supervision for transfers, and Min A for functional mobility 2* the following deficits impacting occupational performance: weakness, decreased ROM, decreased strength, decreased balance, decreased tolerance and increased pain  These impairments, as well at pts steps to enter environment, difficulty performing ADLS and difficulty performing IADLS  limit pts ability to safely engage in all baseline areas of occupation  Pt scored overall 45/100 on the Barthel Index  Based on the aforementioned OT evaluation, functional performance deficits, and assessments, pt has been identified as a Moderate complexity evaluation  Pt to continue to benefit from continued acute OT services during hospital stay to address defined deficits and to maximize level of functional independence in the following Occupational Performance areas: grooming, bathing/shower, toilet hygiene, dressing, medication management, health maintenance, functional mobility, community mobility, clothing management, cleaning, meal prep and household maintenance  From OT standpoint, recommend Home OT upon D/C  OT will continue to follow pt 3-5x/wk to address the following goals to  w/in 7-10 days:   Goals   Patient Goals To have less pain   LTG Time Frame 7-10   Long Term Goal Please refer to LTGs listed below   Plan   Treatment Interventions ADL retraining;Functional transfer training;UE strengthening/ROM; Endurance training;Patient/family training;Equipment evaluation/education; Compensatory technique education; Activityengagement   Goal Expiration Date 20   OT Treatment Day 1   OT Frequency 3-5x/wk   Additional Treatment Session   Start Time    End Time 582   Treatment Assessment Pt seen for OT treatment session focusing on functional activity tolerance, education on Total Shoulder post-op restrictions, one-handed dressing techniques, and donning/doffing R shoulder immobilizer  Pt alert and cooperative throughout session  Pt seated in chair at start of session  Educated pt on the following TSR ROM Protocol: Max External rotation (ER)- 30*, No active Internal rotation (IR), AAROM Forward flexion (FF)- 100* with pt verbalizing understanding of education  The following R UE exercises completed with AROM: Elbow flexion/extension, forearm pronation/supination, wrist flexion/extension, and hand squeezes  Pt able to don/doff R shoulder immobilizer with Min A after education from therapist with cues for safe technique  Educated pt on wearing sling x4 weeks with exception for hygiene and exercises  Educated pt on one-handed dressing techniques w/ pt verbalizing understanding of education  Transfers (sit<>stand) completed w/ Supervision w/ cues for safe technique  Additional functional mobility trial completed w/ Min A with CGA for balance/steadying  Bed mobility (sit>supine) completed w/ Supervision w/ increased time to complete  Pt lying supine at end of session with call bell and phone within reach  All needs met and pt reports no further questions for OT at this time  Continue to recommend Home OT when medically cleared  OT to follow pt on caseload  Additional Treatment Day 1   Recommendation   OT Discharge Recommendation Home with skilled therapy   OT - OK to Discharge Yes  (when medically cleared)   Additional Comments  Pt prefers home therapies over outpatient at this time   Barthel Index   Feeding 5   Bathing 0   Grooming Score 0   Dressing Score 5   Bladder Score 10   Bowels Score 10   Toilet Use Score 5   Transfers (Bed/Chair) Score 10   Mobility (Level Surface) Score 0   Stairs Score 0   Barthel Index Score 45   Modified Dunn Scale   Modified Dunn Scale 4     GOALS    1   Pt will improve activity tolerance to G for min 30 min txment sessions for increase engagement in functional tasks    2  Pt will complete bed mobility at a Mod I level w/ G balance/safety demonstrated to decrease caregiver assistance required     3  Pt will complete UB/LB dressing/self care w/ mod I using adaptive device, one-handed dressing techniques, and DME as needed    4  Pt will complete toileting w/ mod I w/ G hygiene/thoroughness using DME as needed    5  Pt will improve functional transfers to Mod I on/off all surfaces using DME as needed w/ G balance/safety     6  Pt will improve functional mobility during ADL/IADL/leisure tasks to Mod I using DME as needed w/ G balance/safety     7  Pt will be attentive 100% of the time during ongoing cognitive assessment w/ G participation to assist w/ safe d/c planning/recommendations    8  Pt will demonstrate G carryover of pt/caregiver education and training as appropriate w/o cues w/ good tolerance to increase safety during functional tasks    9  Pt will participate in simulated IADL management task to increase independence to Mod I w/ G safety and endurance    10  Pt will demonstrate 100% adherence to R TSR ROM protocol during all functional activities w/o cues from therapist     11  Pt will demonstrate ability to complete HEP (Pendulum exercises, AROM exercises as able within TSR ROM protocol) at a Mod I level after education from therapist    12   Pt will be able to don/doff R shoulder immobilizer at a Mod I level w/ one-handed techniques to decrease caregiver assistance required         Licha Hidalgo OTR/ELIER

## 2020-08-26 NOTE — PLAN OF CARE
Problem: PAIN - ADULT  Goal: Verbalizes/displays adequate comfort level or baseline comfort level  Description: Interventions:  - Encourage patient to monitor pain and request assistance  - Assess pain using appropriate pain scale  - Administer analgesics based on type and severity of pain and evaluate response  - Implement non-pharmacological measures as appropriate and evaluate response  - Consider cultural and social influences on pain and pain management  - Notify physician/advanced practitioner if interventions unsuccessful or patient reports new pain  Outcome: Progressing     Problem: INFECTION - ADULT  Goal: Absence or prevention of progression during hospitalization  Description: INTERVENTIONS:  - Assess and monitor for signs and symptoms of infection  - Monitor lab/diagnostic results  - Monitor all insertion sites, i e  indwelling lines, tubes, and drains  - Monitor endotracheal if appropriate and nasal secretions for changes in amount and color  - [place] appropriate cooling/warming therapies per order  - Administer medications as ordered  - Instruct and encourage patient and family to use good hand hygiene technique  - Identify and instruct in appropriate isolation precautions for identified infection/condition  Outcome: Progressing     Problem: SAFETY ADULT  Goal: Patient will remain free of falls  Description: INTERVENTIONS:  - Assess patient frequently for physical needs  -  Identify cognitive and physical deficits and behaviors that affect risk of falls    -  [place] fall precautions as indicated by assessment   - Educate patient/family on patient safety including physical limitations  - Instruct patient to call for assistance with activity based on assessment  - Modify environment to reduce risk of injury  - Consider OT/PT consult to assist with strengthening/mobility  Outcome: Progressing  Goal: Maintain or return to baseline ADL function  Description: INTERVENTIONS:  -  Assess patient's ability to carry out ADLs; assess patient's baseline for ADL function and identify physical deficits which impact ability to perform ADLs (bathing, care of mouth/teeth, toileting, grooming, dressing, etc )  - Assess/evaluate cause of self-care deficits   - Assess range of motion  - Assess patient's mobility; develop plan if impaired  - Assess patient's need for assistive devices and provide as appropriate  - Encourage maximum independence but intervene and supervise when necessary  - Involve family in performance of ADLs  - Assess for home care needs following discharge   - Consider OT consult to assist with ADL evaluation and planning for discharge  - Provide patient education as appropriate  Outcome: Progressing  Goal: Maintain or return mobility status to optimal level  Description: INTERVENTIONS:  - Assess patient's baseline mobility status (ambulation, transfers, stairs, etc )    - Identify cognitive and physical deficits and behaviors that affect mobility  - Identify mobility aids required to assist with transfers and/or ambulation (gait belt, sit-to-stand, lift, walker, cane, etc )  - [place] fall precautions as indicated by assessment  - Record patient progress and toleration of activity level on Mobility SBAR; progress patient to next Phase/Stage  - Instruct patient to call for assistance with activity based on assessment  - Consider rehabilitation consult to assist with strengthening/weightbearing, etc   Outcome: Progressing     Problem: DISCHARGE PLANNING  Goal: Discharge to home or other facility with appropriate resources  Description: INTERVENTIONS:  - Identify barriers to discharge w/patient and caregiver  - Arrange for needed discharge resources and transportation as appropriate  - Identify discharge learning needs (meds, wound care, etc )  - Arrange for interpretive services to assist at discharge as needed  - Refer to Case Management Department for coordinating discharge planning if the patient needs post-hospital services based on physician/advanced practitioner order or complex needs related to functional status, cognitive ability, or social support system  Outcome: Progressing     Problem: Knowledge Deficit  Goal: Patient/family/caregiver demonstrates understanding of disease process, treatment plan, medications, and discharge instructions  Description: Complete learning assessment and assess knowledge base    Interventions:  - Provide teaching at level of understanding  - Provide teaching via preferred learning methods  Outcome: Progressing

## 2020-08-26 NOTE — CONSULTS
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  Advanced end-stage glenohumeral osteoarthritis with unrepairable rotator cuff tear  Scalene nerve block/status post general anesthesia for surgery with residual weakness right arm  Thyroid nodules, chronic  History of pre operative hematuria worked up by Urology as outpatient  History of urinary incontinence  Chronic systemic hypertension  History of chronic doxycycline suppressive therapy etiology unclear  Risk for osteoporosis/preoperative chest x-ray 05/06/2020 with compression deformity of lower thoracic vertebral bodies     Recommendation: -    Patient is recuperating well as expected following her surgery  She still has residual no block from scalene block over her right arm  Reviewed and discussed with patient regarding postoperative course as expected with probable need for pain medications  I have reviewed patients medications as initiated on post operative orders by the primary team  Recommend  monitoring closely for any hypoxemia / respiratory insufficieny related to narcotics and to reduce doses and frequency of narcotics if necessary  Resume patients home medications and I have reviewed them  She has history of thyroid nodules but does not take any medications  Recommend close workup with OT and PT to ensure that she is stable prior to any discharge planning  Maintain Intravenous Fluids for next 24  hours, till patient able to reliably keep meals and meds in  Suggest  Zofran ODT 4 mg sublingually PRN for control of post operative nausea  Patient encouraged to  use Incentive spirometry q 15 minutes while awake to minimize risk of postoperative atelectasis and pneumonia  Patient verbalized to understand and fully comprehend      Recommend DVT prophylaxis with use of Venodyne compression boots  If any factor X A inhibitor,  low molecule weight heparin or Coumadin is planned by the primary team, we will be happy to dose that  drug based on patient's hemostasis  Maintain ASA as antiplatelet drug per Ortho  Team  Use Proton Pump inhibitor to minimize risk of gastritis  Monitor for any tinnitus as an early sign of salicylism and check salicylate levels in that situation  We will follow this pleasant patient with your service closely and recommend necessary changes based on  further hospital course and diagnostics  Patient advised to follow-up with her primary care physician and also be evaluated for osteoporosis  Thank you, Dr Arlette Lam , for your kind consultation  HISTORY OF PRESENT ILLNESS:    Reason for Consult:  Post operative management following elective right shoulder replacement  HPI: Dani Lomeli is a 68 y o  female , who is a very pleasant elderly retired RN, she has suffered with chronic pain in her right shoulder for long period of time and had tried various conservative treatments which had eventually failed  She was having severe pain rated 10/10 on simple activities of daily living and even taking pain medications intra-articular injections did not help her symptoms  Patient apparently had worsening of her shoulder pain around spring of 2019  At that time she had taken 2 injections in her shoulder joint and also had a course of physical therapy which only helped her slightly  She began to have swelling in her right shoulder with activities and that became more problematic for her  This is not associated with any injury fever any tick bites etc   Patient had x-rays done on 04/26/2019 where she was found to have a high riding humeral head, no bony lesions were noted, no significant glenoid wear was noted that time, rotator cuff tear arthropathy was diagnosed    MRI scan of the left shoulder obtained at [facility] on 02/21/2020 was consistent with rotator cuff tear arthropathy with a retracted rotator cuff tear with atrophy  This was not repairable per Dr Candis Sierra in his outpatient records  Glenohumeral loss with arthritis and high riding humeral head was noted  There was subluxation in the biceps tendon  Upon her most recent follow-up with Dr Candis Sierra as outpatient she discussed about shoulder replacement therapy and after being cleared by her primary care physician she proceeded with surgery earlier today at [facility]     At present she is recuperating well her pain is 5/10 on numeric pain scale and requested pain medications  She denied any bleeding from her surgical site and has been using her arm in a shoulder sling  Review of Systems   Constitutional:  No recent  appetite change, denies chills, diaphoresis, fatigue or fever  HEENT:  Negative for congestion, sore throat and tinnitus  No visual complaints  Respiratory:  Negative cough, chest tightness, shortness of breath and wheezing  Cardiovascular:  Negative for chest pain and palpitations  Gastrointestinal: Negative for abdominal distention or pain, constipation, diarrhea and nausea  Endocrine: Negative for cold intolerance, heat intolerance, polydipsia and polyuria  History of thyroid nodules being followed by her primary care physician   Genitourinary:                Negative for  dysuria, flank pain  History of microscopic hematuria worked up by Urology  Skin:   Negative for color change, pallor and rash  Neurological:   Negative for dizziness, tremors, seizures, syncope, facial asymmetry   Hematological:  Musculoskeletal:  Psychiatric:  No h/o spontaneous bruising/bleeding  Joint pains as above  Negative for agitation, behavioral problems, confusion      A complete system-based review of systems as discussed with patient is otherwise negative      PAST MEDICAL HISTORY:  Past Medical History:   Diagnosis Date    Arthritis  Disease of thyroid gland     thyroid nodules    Dry eye     Hard of hearing     Hematuria     Hypertension     Leaking of urine     Osteoarthritis     Peritonitis ([address] )     in age 44's due to ruptured apendix    Shoulder joint pain     right- reverse arthroplasty today 2020    Wears glasses     Wears partial dentures     upper     Past Surgical History:   Procedure Laterality Date    APPENDECTOMY      CHOLECYSTECTOMY LAPAROSCOPIC      COLONOSCOPY      HIP SURGERY      JOINT REPLACEMENT Right     hip    LAPAROSCOPIC CHOLECYSTECTOMY      LAPAROSCOPY      TONSILLECTOMY      TUBAL LIGATION      WISDOM TOOTH EXTRACTION         FAMILY HISTORY:  Family history significant for hypertension coronary artery disease  SOCIAL HISTORY:  Social History     Substance and Sexual Activity   Alcohol Use Yes    Alcohol/week: 1 0 standard drinks    Types: 1 Glasses of wine per week    Frequency: 2-3 times a week    Drinks per session: 1 or 2    Binge frequency: Never     Social History     Substance and Sexual Activity   Drug Use No     Social History     Tobacco Use   Smoking Status Former Smoker    Packs/day: 0 25    Years: 10     Pack years: 2 50    Last attempt to quit:     Years since quittin 6   Smokeless Tobacco Never Used       ALLERGIES:  No Known Allergies    MEDICATIONS:  All current active medications have been reviewed    Current Facility-Administered Medications   Medication Dose Route Frequency Provider Last Rate Last Dose    acetaminophen (TYLENOL) tablet 650 mg  650 mg Oral Q6H PRN Lidia [place], PA-C        aspirin tablet 325 mg  325 mg Oral Daily Lidia Wilhelm, PA-C   325 mg at 20 1555    ceFAZolin (ANCEF) IVPB (premix) 1,000 mg 50 mL  1,000 mg Intravenous Q8H Lidia Wilhelm, PA-C 100 mL/hr at 20 1935 1,000 mg at 20 1935    docusate sodium (COLACE) capsule 100 mg  100 mg Oral BID Lidia Wilhelm, PA-C   100 mg at 20 1717    HYDROmorphone (DILAUDID) injection 0 5 mg  0 5 mg Intravenous Q2H PRN Lidia Badger, PA-C        ketorolac (TORADOL) injection 15 mg  15 mg Intravenous Q6H PRN Lidia Badger, PA-C        lactated ringers bolus 1,000 mL  1,000 mL Intravenous Once PRN Lidia Badger, PA-C        And    lactated ringers bolus 1,000 mL  1,000 mL Intravenous Once PRN Lidia Melonie, PA-C        lactated ringers infusion  100 mL/hr Intravenous Continuous Lidia Badger, PA-C 100 mL/hr at 20 1555 100 mL/hr at 20 1555    [START ON 2020] lisinopril (ZESTRIL) tablet 40 mg  40 mg Oral Daily Lynsey Morgan MD        ondansetron [facility]) injection 4 mg  4 mg Intravenous Q6H PRN Lidia Melonie, PA-C        oxyCODONE (ROXICODONE) immediate release tablet 10 mg  10 mg Oral Q6H PRN Lidia Melonie, PA-C        oxyCODONE (ROXICODONE) IR tablet 5 mg  5 mg Oral Q4H PRN Lidia Melonie, PA-C        senna (SENOKOT) tablet 8 6 mg  1 tablet Oral Daily Lidia Melonie, PA-C   8 6 mg at 20 1555    sodium chloride 0 9 % bolus 1,000 mL  1,000 mL Intravenous Once PRN Lidia Melonie, PA-C        And    sodium chloride 0 9 % bolus 1,000 mL  1,000 mL Intravenous Once PRN Lidia Badger, PA-C        sodium chloride 0 9 % infusion  125 mL/hr Intravenous Continuous Laboni Lavern,  mL/hr at 20 5445         Medications Prior to Admission   Medication    Cholecalciferol (VITAMIN D) 50 MCG (2000) tablet    conjugated estrogens (PREMARIN) vaginal cream    doxycycline (DORYX) 100 MG EC tablet    lisinopril (ZESTRIL) 40 mg tablet    Multiple Vitamins-Minerals (MULTIVITAMIN WOMEN PO)    Naproxen Sodium (Aleve) 220 MG CAPS           PHYSICAL EXAM:    Vitals:  Temp:  [97 4 °F (36 3 °C)-97 7 °F (36 5 °C)] 97 7 °F (36 5 °C)  HR:  [64-98] 73  Resp:  [14-22] 18  BP: (132-186)/(60-84) 142/78  SpO2:  [96 %-100 %] 97 %  Temp (24hrs), Av 6 °F (36 4 °C), Min:97 4 °F (36 3 °C), Max:97 7 °F (36 5 °C)  Current: Temperature: 97 7 °F (36 5 °C)  Body mass index is 21 08 kg/m²  General Appearance:    Awake, alert, cooperative, appears of stated age,    Average build, average nutrition  Head:    Normocephalic, without obvious abnormality, atraumatic   Eyes:    Pupils equal in size,conjunctiva/corneas clear, EOM's intact  Ears:    External ear canals, both ears no drainage or redness  Nose:   No epistaxis/ discharge from nares  Throat:   Lips, mucosa, and tongue normal    Neck:   Supple, symmetrical, trachea midline, no JVP  Back:     Mild kyphosis  No tenderness   Lungs:     Bilateral air entry is broncho-alveolar and equal        No crepitation or rales  No pleural rub  Heart:    Regular rate and rhythm, S1S2 normal, no murmur  Abdomen:     Soft, non-tender, no masses, no organomegaly   Musculoskeletal:   Right shoulder clean bulky dressing    No discharge no drainage no evidence of bleeding    Arm in a shoulder sling    No swelling of the right arm  Numbness of distal tips of fingers  Vascular:   2+ in Posterior tibialis / dorsalis pedis/radial and ulnar arteries   Skin:   Skin color, texture, turgor normal, no rashes or lesions   Neurologic:   Oriented/ Awake/ facial symmetry maintained  Speech is intact  Muscle bulk and strength is equivocal in B/L Upper and lower extremities except on operated right upper limb  Operated right arm is still numb particularly distally  Light touch sensation is intact B/l LE  B/L Pl[place] flexion is WNL  LABS, IMAGING, & OTHER STUDIES:  Lab Results:  I have personally reviewed pertinent labs    Lab Results   Component Value Date     08/11/2020    CO2 27 08/11/2020    BUN 16 08/11/2020    CREATININE 0 54 (L) 08/11/2020    EGFR 91 08/11/2020    CALCIUM 9 0 08/11/2020    AST 16 08/11/2020    ALT 23 08/11/2020    ALKPHOS 81 08/11/2020     Lab Results   Component Value Date    WBC 5 09 08/11/2020    WBC 4 50 05/06/2020    HGB 13 5 08/11/2020    HGB 13 6 05/06/2020     08/11/2020  05/06/2020       Lab Results   Component Value Date    INR 0 93 08/11/2020    INR 0 94 05/06/2020    HGBA1C 5 4 08/11/2020    HGBA1C 5 3 05/06/2020         Imaging Studies:   I have personally reviewed pertinent imaging study reports  Imaging:    As reported on outpatient records were noted  05/06/2020 chest x-ray reportedly no acute cardiopulmonary disease, severe right glenohumeral joint arthritis to arthritis was noted with moderate compression deformity of the lower thoracic vertebra by the radiologist based on her chest x-ray itself  EKG, Pathology, and Other Studies:   I have personally reviewed pertinent reports  Preoperative electrocardiogram dated 05/18/2020 was noted  It was reported to have shown normal sinus rhythm with low-voltage QRS  Heart rate was 87 beats per minute, QTC interval 442 millisecond  EKG was reviewed  Portions of the record may have been created with voice recognition software  Occasional wrong word or "sound a like" substitutions may have occurred due to the inherent limitations of voice recognition software  Read the chart carefully and recognize, using context, where substitutions have occurred

## 2020-08-26 NOTE — PLAN OF CARE
Problem: PHYSICAL THERAPY ADULT  Goal: Performs mobility at highest level of function for planned discharge setting  See evaluation for individualized goals  Description: Treatment/Interventions: Functional transfer training, LE strengthening/ROM, Therapeutic exercise, Elevations, Endurance training, Patient/family training, Bed mobility, Gait training, Spoke to nursing, OT          See flowsheet documentation for full assessment, interventions and recommendations  Outcome: Progressing  Note: Prognosis: Good  Problem List: Decreased strength, Decreased range of motion, Decreased endurance, Impaired balance, Decreased mobility, Pain, Orthopedic restrictions  Assessment: Pt  68 y  o female admitted for elective R Reverse total shoulder arthroplasty on 8/25/20 2* to R shoulder DJD & unrepairable Rotator cuff tear arthropathy, right  Pt referred to PT for mobility assessment & D/C planning w/ orders  PTA, pt reports being I w/o AD  Per Ortho, (+) TSR protocol; Sling x 4 weeks, PT - Max ER 30 degrees, No Active IR, AAROM FF to 100 degrees  On eval, pt demonstrate dec mobility, balance, endurance & amb  Pt require S for bed mobility & transfers; CGAx1 for amb + cues for techniques  Gait deviations as above, slow & guarded but no gross LOB noted  Pt prefers to hold onto IV pole for support  No dizziness reported t/o session  Pt instructed on TSR protocol, sling application & RUE thera  ex w/ good understanding  Please see PT progress note below for details  Nsg staff most recent vital signs as follows: /74 (BP Location: Left arm)   Pulse 76   Temp 98 3 °F (36 8 °C) (Temporal)   Resp 18   Ht 5' 3" (1 6 m)   Wt 54 kg (119 lb)   SpO2 99%   BMI 21 08 kg/m²   At end of session, pt back in bed w/o issues, call bell & phone in reach  Fall precautions reinforced w/ good understanding  Will continue skilled PT to improve function & safety  At this time, will anticipate good progress in PT for safe D/C to home   Will recommend HHPT (pt prefers HHPT over OPPT) & family support at D/C  CM to follow  Nsg staff to continue to mobilized pt (OOB in chair for all meals & ambulate in room/unit) as tolerated to prevent further decline in function  Nsg notified  Barriers to Discharge: None     PT Discharge Recommendation: Home with skilled therapy(HHPT (pt prefers HHPT over OPPT) w/ family support)     PT - OK to Discharge: Yes(when medically cleared)    See flowsheet documentation for full assessment

## 2020-08-26 NOTE — PROGRESS NOTES
Progress Note - Internal Medicine   Jackeline Apodaca 68 y o  female MRN: [id number]  Unit/Bed#: E2 -01 Encounter: [id number]      Impression :       1  S/p POD #1,  elective right shoulder reverse replacement  2  Advanced end-stage glenohumeral osteoarthritis with unrepairable rotator cuff tear  3  Scalene nerve block/status post general anesthesia for surgery with residual weakness right arm  4  Thyroid nodules, chronic  5  History of pre operative hematuria worked up by Urology as outpatient  6  History of urinary incontinence  7  Chronic systemic hypertension  8  Chronic hyponatremia, possibly reset Osmo stat  9  History of chronic doxycycline suppressive therapy etiology unclear  10  Risk for osteoporosis/preoperative chest x-ray 05/06/2020 with compression deformity of lower thoracic vertebral bodies      Recommendation:    Continue OT PT early rehabilitation and mobilization  Full fall precautions and use of orthostatic reconditioning exercises  Bilateral Cassius stockings and compression boots while in bed and use of aspirin for DVT prophylaxis  Patient has chronic hyponatremia indicates has been extensively worked up as outpatient, possibly reset Osmo stat  Recommend maintaining her hydration with oral intake of fluids and discontinuing IV fluids  Patient has history of urinary incontinence should be worked up as outpatient for the same, likely avoiding to drink because of the same  Patient educated to minimize avoidance for that reason otherwise she can have complications of dizziness lightheadedness due to volume depletion as well  Subjective:     Patient seen and examined today  EMR and overnight events reviewed  Patient resting in bed not in any distress  States that she could not get very good sleep last night and was avoiding to take pain medications  She waited till the pain got really bad and then had to take Dilaudid  She did not have any side effects of nausea or mentation changes  Though she is little anxious  She talks about not drinking fluids as she has to go to the bathroom frequently which she does not like  She also wants to stop fluids  She was osteo about her low sodium state and she indicated that she has had low sodium for long period of time and has been worked up extensively by her outpatient physicians and has remained in the same range of 132 for long period of time  Review of Systems     Constitutional: No chills, diaphoresis, fatigue or fever  HEENT: No sore throat  No visual complaints  Respiratory: No cough, chest tightness, shortness of breath and wheezing  Cardiovascular: No chest pain and palpitations  No Syncope or grey out  GI: Negative for abdominal distention, pain, constipation, diarrhea or nausea  Genitourinary: Chronic urinary incontinence  Skin: Negative for rash  Neurological: Negative for dizziness, weakness, numbness and headaches  Hematological: Negative for spontaneous bruising or bleeding  Psychiatric: Negative for confusion, dysphoric mood, hallucinations  All other systems reviewed and are negative  OBJECTIVE:     Vitals:     Temp:  [97 4 °F (36 3 °C)-97 8 °F (36 6 °C)] 97 8 °F (36 6 °C)  HR:  [64-98] 75  Resp:  [14-22] 20  BP: (126-186)/(60-84) 126/60  SpO2:  [96 %-100 %] 98 %  Temp (24hrs), Av 6 °F (36 4 °C), Min:97 4 °F (36 3 °C), Max:97 8 °F (36 6 °C)  Current: Temperature: 97 8 °F (36 6 °C)    Intake/Output Summary (Last 24 hours) at 2020 0738  Last data filed at 2020 0418  Gross per 24 hour   Intake 2638 33 ml   Output 100 ml   Net 2538 33 ml     Body mass index is 21 08 kg/m²  Physical Exam    General Appearance:  Awake,alert, cooperative  Not in distress  Pallor / Icterus/ Cyanosis negative  Oropharynx no thrush  Patient is of average build  Tongue protrudes in midline/ slightly dry appearing   Head:  Normocephalic, atraumatic  No titubations     Eyes:  No eye redness or discharge bilaterally  Neck: Supple, no raised JVP  Lungs:   B/L Clear to auscultation, no wheezing  Normal broncho-alveolar air entry  Heart:   Regular S1S2, A2P2 normal, no murmur  Abdomen:   Soft, non-tender,no rebound, bowel sounds+  Musculoskeletal: Extremities no cyanosis or edema  Surgical site on the operated right shoulder has clean dressing  No discharge or drainage  Psych: Little anxious/talks about her low sodium  Which has been chronic  Neurologic:             Skin:  Endocrine:  Vascular: Awake and alert  Mentation intact  Speech is intact  Facial symmetry is maintained  Right hand numbness is improved,  Limited range of motion at the right shoulder, using a shoulder sling  Strength within normal limit in lower extremities and left hand  No sensory diminution  No rashes /purpura  No open wounds  No thyromegaly / no lid lag  Homans sign is negative  B/L Calf are supple and non tender         Labs, Imaging, & Other studies:    All pertinent labs and imaging studies were personally reviewed      Results from last 7 days   Lab Units 08/26/20  0424   POTASSIUM mmol/L 3 6   CHLORIDE mmol/L 98*   CO2 mmol/L 27   BUN mg/dL 6   CREATININE mg/dL 0 67   EGFR ml/min/1 73sq m 84   CALCIUM mg/dL 8 5           Current Meds:  Current Facility-Administered Medications   Medication Dose Route Frequency Provider Last Rate Last Dose    acetaminophen (TYLENOL) tablet 650 mg  650 mg Oral Q6H PRN Richard Montesinos PA-C        aspirin tablet 325 mg  325 mg Oral Daily Richard Montesinos PA-C   325 mg at 08/25/20 1555    docusate sodium (COLACE) capsule 100 mg  100 mg Oral BID Richard Montesinos PA-C   100 mg at 08/25/20 1717    HYDROmorphone (DILAUDID) injection 0 5 mg  0 5 mg Intravenous Q2H PRN Julian Holcomb MD        HYDROmorphone (DILAUDID) injection 0 5 mg  0 5 mg Intravenous Q2H PRN Julian Holcomb MD   0 5 mg at 08/26/20 0533    ketorolac (TORADOL) injection 15 mg  15 mg Intravenous Q6H PRN Dinh Stephenson Arian James PA-C        lactated ringers bolus 1,000 mL  1,000 mL Intravenous Once PRN Lidia Wilhelm, PA-C        And    lactated ringers bolus 1,000 mL  1,000 mL Intravenous Once PRN Lidia Melonie, PA-C        lactated ringers infusion  100 mL/hr Intravenous Continuous Lidia Melonie, PA-C 100 mL/hr at 08/26/20 0418 100 mL/hr at 08/26/20 0418    lisinopril (ZESTRIL) tablet 40 mg  40 mg Oral Daily Lynsey Morgan MD        ondansetron [facility]) injection 4 mg  4 mg Intravenous Q6H PRN Lidia Wilhelm, PA-C        oxyCODONE (ROXICODONE) immediate release tablet 10 mg  10 mg Oral Q6H PRN Lidia Wilhelm, PA-C   10 mg at 08/26/20 0413    oxyCODONE (ROXICODONE) IR tablet 5 mg  5 mg Oral Q4H PRN Lidia Wilhelm, PA-C        senna (SENOKOT) tablet 8 6 mg  1 tablet Oral Daily Lidia Wilhelm, PA-C   8 6 mg at 08/25/20 1555    sodium chloride 0 9 % bolus 1,000 mL  1,000 mL Intravenous Once PRN Lidia Wilhelm, PA-C        And    sodium chloride 0 9 % bolus 1,000 mL  1,000 mL Intravenous Once PRN Lidianelly Wilhelm, PA-C        sodium chloride 0 9 % infusion  125 mL/hr Intravenous Continuous Laboni Lavern,  mL/hr at 08/25/20 0948       Home Meds:  Medications Prior to Admission   Medication    Cholecalciferol (VITAMIN D) 50 MCG (2000 UT) tablet    conjugated estrogens (PREMARIN) vaginal cream    doxycycline (DORYX) 100 MG EC tablet    lisinopril (ZESTRIL) 40 mg tablet    Multiple Vitamins-Minerals (MULTIVITAMIN WOMEN PO)    Naproxen Sodium (Aleve) 220 MG CAPS       Continuous Infusions:  lactated ringers, 100 mL/hr, Last Rate: 100 mL/hr (08/26/20 0418)  sodium chloride, 125 mL/hr, Last Rate: 125 mL/hr (08/25/20 0948)        Invasive Devices     Peripheral Intravenous Line            Peripheral IV 08/25/20 Left Wrist less than 1 day                VTE Pharmacologic Prophylaxis:  as per Primary Ortho Team   VTE Mechanical Prophylaxis: sequential compression device    Portions of the record may have been created with voice recognition software  Occasional wrong word or "sound a like" substitutions may have occurred due to the inherent limitations of voice recognition software  Read the chart carefully and recognize, using context, where substitutions have occurred

## 2020-08-27 NOTE — DISCHARGE SUMMARY
DISCHARGE SUMMARY      Patient Name: Deirdre Alaniz  Patient MRN: [id number]  Admitting Provider: Bo Rice MD  Discharging Provider: No att  providers found  Primary Care Physician at Discharge: Tammy Ricketts [phone]  Admission Date: 8/25/2020       Discharge Date: 8/26/2020    Admission Diagnosis  Primary osteoarthritis, right shoulder [M19 011]  Pain in right shoulder [M25 511]    Discharge Diagnoses  Principal Problem:    Rotator cuff tear arthropathy, right  Active Problems:    Wears partial dentures    Hypertension    Dry eye    Disease of thyroid gland  Resolved Problems:    * No resolved hospital problems  [address] On[address] Mills was admitted to [facility] on 8/25/2020, with diagnosis of osteoarthritis in her Right shoulder  On the day of admission, she was brought to surgery, successfully underwent a Right shoulder replacement  She tolerated the procedure well  Following surgery, she was taken to the recovery room, at which time, there was a consult placed for Dr Kash Farmer for the patient's medical management  After a short stay in the recovery room, she was transferred to the orthopedic floor at which time, she was resumed on her routine home medications per the hospitalist group  On postop day #1, she was able to start some physical therapy and was able to tolerate it well  At this time, she was in stable condition, showing no sign of deep vein thrombosis or pulmonary embolism  Incision was healing well at the time and showed no signs of infection  DISCHARGE DIAGNOSIS: Primary osteoarthritis, right shoulder [M19 011]  Pain in right shoulder [M25 511]  She is now status post Right total knee replacement, which she underwent during the hospital stay  Medications  See after visit summary for reconciled discharge medications provided to patient and family        Allergies  No Known Allergies    Outpatient Follow-Up  Future Appointments Date Time Provider Ashanti Sandy   2/23/2021  9:30 AM Leroy Rizzo MD [facility]       Consults   Medical Management - Dr Cordova Edu Results   Component Value Date    HCT 39 6 08/11/2020     Lab Results   Component Value Date    CALCIUM 8 5 08/26/2020    K 3 6 08/26/2020    CO2 27 08/26/2020    CL 98 (L) 08/26/2020    BUN 6 08/26/2020    CREATININE 0 67 08/26/2020        Discharge Disposition  home    Operative Procedures Performed  Procedure(s):  ARTHROPLASTY SHOULDER REVERSE      Discharge Instructions  ASA x 3 weeks for DVT prophylaxis   Sling x 4 weeks  Follow up x 3 weeks in the office  Suture ends trimmed POD #10 to skin level  ? Richard Montesinos PA-C  5:29 PM, 8/27/2020   DISCHARGE SUMMARY      Patient Name: Dani Lomeli  Patient MRN: [id number]  Admitting Provider: Nicole Purdy MD  Discharging Provider: No att  providers found  Primary Care Physician at Discharge: Leroy Rizzo [phone]  Admission Date: 8/25/2020       Discharge Date: 8/26/2020    Admission Diagnosis  Primary osteoarthritis, right shoulder [M19 011]  Pain in right shoulder [M25 511]    Discharge Diagnoses  Principal Problem:    Rotator cuff tear arthropathy, right  Active Problems:    Wears partial dentures    Hypertension    Dry eye    Disease of thyroid gland  Resolved Problems:    * No resolved hospital problems  Santo Mills was admitted to [address] [place] [address] on 8/25/2020, with diagnosis of osteoarthritis in her Right shoulder  On the day of admission, she was brought to surgery, successfully underwent a Right shoulder replacement  She tolerated the procedure well  Following surgery, she was taken to the recovery room, at which time, there was a consult placed for Dr [address] for the patient's medical management       After a short stay in the recovery room, she was transferred to the orthopedic floor at which time, she was resumed on her routine home medications per the hospitalist group  On postop day #1, she was able to start some physical therapy and was able to tolerate it well  At this time, she was in stable condition, showing no sign of deep vein thrombosis or pulmonary embolism  Incision was healing well at the time and showed no signs of infection  DISCHARGE DIAGNOSIS: Primary osteoarthritis, right shoulder [M19 011]  Pain in right shoulder [M25 511]  She is now status post Right total knee replacement, which she underwent during the hospital stay  Medications  See after visit summary for reconciled discharge medications provided to patient and family  Allergies  No Known Allergies    Outpatient Follow-Up  Future Appointments   Date Time Provider Department Center   2/23/2021  9:30 AM Angel Wilson MD [facility]       Consults   Medical Management - Dr Leeanna Gan Results   Component Value Date    HCT 39 6 08/11/2020     Lab Results   Component Value Date    CALCIUM 8 5 08/26/2020    K 3 6 08/26/2020    CO2 27 08/26/2020    CL 98 (L) 08/26/2020    BUN 6 08/26/2020    CREATININE 0 67 08/26/2020        Discharge Disposition  Home    Operative Procedures Performed  Procedure(s):  ARTHROPLASTY SHOULDER REVERSE      Discharge Instructions  ASA x 3 weeks for DVT prophylaxis   Sling x 4 weeks  Follow up x 3 weeks in the office  Suture ends trimmed POD #10 to skin level  ?     Denver Vasquez PA-C  5:29 PM, 8/27/2020

## 2020-09-28 ENCOUNTER — EVALUATION (OUTPATIENT)
Dept: PHYSICAL THERAPY | Facility: CLINIC | Age: 80
End: 2020-09-28
Payer: COMMERCIAL

## 2020-09-28 ENCOUNTER — APPOINTMENT (OUTPATIENT)
Dept: PHYSICAL THERAPY | Facility: CLINIC | Age: 80
End: 2020-09-28
Payer: COMMERCIAL

## 2020-09-28 DIAGNOSIS — Z96.611 S/P REVERSE TOTAL SHOULDER ARTHROPLASTY, RIGHT: Primary | ICD-10-CM

## 2020-09-28 PROCEDURE — 97161 PT EVAL LOW COMPLEX 20 MIN: CPT | Performed by: PHYSICAL THERAPIST

## 2020-09-28 PROCEDURE — 97110 THERAPEUTIC EXERCISES: CPT | Performed by: PHYSICAL THERAPIST

## 2020-09-28 NOTE — PROGRESS NOTES
PT Evaluation     Today's date: 2020  Patient name: Charissa Castleman  : 1940  MRN: [id number]  Referring provider: J Luis Acosta MD  Dx:   Encounter Diagnosis     ICD-10-CM    1  S/P reverse total shoulder arthroplasty, right  Z96 611                   Assessment  Assessment details: Charissa Castleman is a 68 y o  female who presents s/p R reverse total shoulder replacement  Pt has decreased R UE strength and ROM  Pt currently has difficulty reaching above shoulder level and behind back, unable to lift >5 lbs, difficulty dressing  Pt would benefit from skilled PT to address the above impairments and to return to pain free function  Impairments: abnormal or restricted ROM, impaired physical strength, lacks appropriate home exercise program and pain with function  Functional limitations: difficulty reaching above shoulder level and behind back, unable to lift >5 lbs, difficulty dressing  Symptom irritability: moderateUnderstanding of Dx/Px/POC: good   Prognosis: good    Goals  1  Pt will be independent with HEP upon DC  2  Pt's R UE strength will be at least 4/5 t/o to allow for carrying things  3  Pt's R UE ROM will be WNL to allow for reaching and dressing  4  Pt's pain will be no more than 1/10 to allow for return to PLOF  Plan  Patient would benefit from: skilled physical therapy  Planned therapy interventions: joint mobilization, manual therapy, neuromuscular re-education, therapeutic activities and therapeutic exercise  Frequency: 2x week  Duration in visits: 12  Duration in weeks: 6  Treatment plan discussed with: patient        Subjective Evaluation    History of Present Illness  Date of surgery: 2020  Mechanism of injury: surgery  Mechanism of injury: Pt underwent R reverse total shoulder replacement  Pt had home health PT up until last Tuesday  Sling has been discharged at 4 weeks post op  Pt presents to OP PT            Not a recurrent problem   Quality of life: good    Pain  Current pain ratin  At best pain ratin  At worst pain rating: 3  Location: r shoulder  Quality: dull ache  Relieving factors: rest  Progression: improved    Hand dominance: right      Diagnostic Tests  No diagnostic tests performed        Objective     Active Range of Motion     Right Shoulder   Flexion: 118 degrees   Abduction: 85 degrees   External rotation 90°: 45 degrees  Internal rotation 90°: 40 degrees     Passive Range of Motion     Right Shoulder   Flexion: 155 degrees   Abduction: 110 degrees   External rotation 90°: 70 degrees   Internal rotation 90°: 50 degrees     Strength/Myotome Testing     Right Shoulder     Planes of Motion   Flexion: 3+   Abduction: 3+   External rotation at 0°: 3+   Internal rotation at 0°: 3+              Precautions: see protocol, 5 weeks , progress to AROM 10/6       Manuals             PROM per protocol 8'                                                   Neuro Re-Ed             Posture tband             Mahad Allan (ext/IR)                                                                              Ther Ex             Wall slides (flex/ab) 10x10" ea            Pulleys (flex/ab)             Supine cane AAROM (flex/ab/ER)             Supine flexion             SL abduction             SL ER             SL serratus punch             Ther Activity

## 2020-09-30 ENCOUNTER — OFFICE VISIT (OUTPATIENT)
Dept: PHYSICAL THERAPY | Facility: CLINIC | Age: 80
End: 2020-09-30
Payer: COMMERCIAL

## 2020-09-30 DIAGNOSIS — Z96.611 S/P REVERSE TOTAL SHOULDER ARTHROPLASTY, RIGHT: Primary | ICD-10-CM

## 2020-09-30 PROCEDURE — 97110 THERAPEUTIC EXERCISES: CPT | Performed by: PHYSICAL THERAPIST

## 2020-09-30 PROCEDURE — 97140 MANUAL THERAPY 1/> REGIONS: CPT | Performed by: PHYSICAL THERAPIST

## 2020-09-30 NOTE — PROGRESS NOTES
Daily Note     Today's date: 2020  Patient name: Deirdre Alaniz  : 1940  MRN: [id number]  Referring provider: Jason Benjamin MD  Dx:   Encounter Diagnosis     ICD-10-CM    1  S/P reverse total shoulder arthroplasty, right  Z96 611                   Subjective: "I did my exercises this morning "      Objective: See treatment diary below      Assessment: Pt had good tolerance to initiation of program  Good tolerance to PROM with no severe restrictions  Plan: Continue per plan of care        Precautions: see protocol, 5 weeks , progress to AROM 10/       Manuals            PROM per protocol 8' 10'                                                  Neuro Re-Ed             Posture tband             Irena Melissa (ext/IR)  10x10" ea                                                                            Ther Ex             Wall slides (flex/ab) 10x10" ea HEP           Pulleys (flex/ab)  10x10" ea           Supine cane AAROM (flex/ab/ER)  10x10" ea           Supine flexion  x15           SL abduction             SL ER             SL serratus punch             Ther Activity

## 2020-10-05 ENCOUNTER — OFFICE VISIT (OUTPATIENT)
Dept: PHYSICAL THERAPY | Facility: CLINIC | Age: 80
End: 2020-10-05
Payer: COMMERCIAL

## 2020-10-05 DIAGNOSIS — Z96.611 S/P REVERSE TOTAL SHOULDER ARTHROPLASTY, RIGHT: Primary | ICD-10-CM

## 2020-10-05 PROCEDURE — 97110 THERAPEUTIC EXERCISES: CPT

## 2020-10-05 PROCEDURE — 97140 MANUAL THERAPY 1/> REGIONS: CPT

## 2020-10-07 ENCOUNTER — OFFICE VISIT (OUTPATIENT)
Dept: PHYSICAL THERAPY | Facility: CLINIC | Age: 80
End: 2020-10-07
Payer: COMMERCIAL

## 2020-10-07 DIAGNOSIS — Z96.611 S/P REVERSE TOTAL SHOULDER ARTHROPLASTY, RIGHT: Primary | ICD-10-CM

## 2020-10-07 PROCEDURE — 97140 MANUAL THERAPY 1/> REGIONS: CPT | Performed by: PHYSICAL THERAPIST

## 2020-10-07 PROCEDURE — 97112 NEUROMUSCULAR REEDUCATION: CPT | Performed by: PHYSICAL THERAPIST

## 2020-10-07 PROCEDURE — 97110 THERAPEUTIC EXERCISES: CPT | Performed by: PHYSICAL THERAPIST

## 2020-10-12 ENCOUNTER — APPOINTMENT (OUTPATIENT)
Dept: PHYSICAL THERAPY | Facility: CLINIC | Age: 80
End: 2020-10-12
Payer: COMMERCIAL

## 2020-10-13 ENCOUNTER — TELEPHONE (OUTPATIENT)
Dept: FAMILY MEDICINE CLINIC | Facility: CLINIC | Age: 80
End: 2020-10-13

## 2020-10-13 DIAGNOSIS — Z12.31 ENCOUNTER FOR SCREENING MAMMOGRAM FOR MALIGNANT NEOPLASM OF BREAST: Primary | ICD-10-CM

## 2020-10-14 ENCOUNTER — OFFICE VISIT (OUTPATIENT)
Dept: PHYSICAL THERAPY | Facility: CLINIC | Age: 80
End: 2020-10-14
Payer: COMMERCIAL

## 2020-10-14 DIAGNOSIS — Z96.611 S/P REVERSE TOTAL SHOULDER ARTHROPLASTY, RIGHT: Primary | ICD-10-CM

## 2020-10-14 PROCEDURE — 97110 THERAPEUTIC EXERCISES: CPT | Performed by: PHYSICAL THERAPIST

## 2020-10-14 PROCEDURE — 97140 MANUAL THERAPY 1/> REGIONS: CPT | Performed by: PHYSICAL THERAPIST

## 2020-10-19 ENCOUNTER — OFFICE VISIT (OUTPATIENT)
Dept: PHYSICAL THERAPY | Facility: CLINIC | Age: 80
End: 2020-10-19
Payer: COMMERCIAL

## 2020-10-19 DIAGNOSIS — Z96.611 S/P REVERSE TOTAL SHOULDER ARTHROPLASTY, RIGHT: Primary | ICD-10-CM

## 2020-10-19 PROCEDURE — 97140 MANUAL THERAPY 1/> REGIONS: CPT

## 2020-10-19 PROCEDURE — 97110 THERAPEUTIC EXERCISES: CPT

## 2020-10-21 ENCOUNTER — OFFICE VISIT (OUTPATIENT)
Dept: PHYSICAL THERAPY | Facility: CLINIC | Age: 80
End: 2020-10-21
Payer: COMMERCIAL

## 2020-10-21 DIAGNOSIS — Z96.611 S/P REVERSE TOTAL SHOULDER ARTHROPLASTY, RIGHT: Primary | ICD-10-CM

## 2020-10-21 PROCEDURE — 97110 THERAPEUTIC EXERCISES: CPT

## 2020-10-21 PROCEDURE — 97140 MANUAL THERAPY 1/> REGIONS: CPT

## 2020-10-26 ENCOUNTER — OFFICE VISIT (OUTPATIENT)
Dept: PHYSICAL THERAPY | Facility: CLINIC | Age: 80
End: 2020-10-26
Payer: COMMERCIAL

## 2020-10-26 DIAGNOSIS — Z96.611 S/P REVERSE TOTAL SHOULDER ARTHROPLASTY, RIGHT: Primary | ICD-10-CM

## 2020-10-26 PROCEDURE — 97110 THERAPEUTIC EXERCISES: CPT | Performed by: PHYSICAL THERAPIST

## 2020-10-26 PROCEDURE — 97112 NEUROMUSCULAR REEDUCATION: CPT | Performed by: PHYSICAL THERAPIST

## 2020-10-28 ENCOUNTER — OFFICE VISIT (OUTPATIENT)
Dept: PHYSICAL THERAPY | Facility: CLINIC | Age: 80
End: 2020-10-28
Payer: COMMERCIAL

## 2020-10-28 DIAGNOSIS — Z96.611 S/P REVERSE TOTAL SHOULDER ARTHROPLASTY, RIGHT: Primary | ICD-10-CM

## 2020-10-28 PROCEDURE — 97112 NEUROMUSCULAR REEDUCATION: CPT

## 2020-10-28 PROCEDURE — 97110 THERAPEUTIC EXERCISES: CPT

## 2020-11-01 ENCOUNTER — OFFICE VISIT (OUTPATIENT)
Dept: URGENT CARE | Facility: CLINIC | Age: 80
End: 2020-11-01
Payer: COMMERCIAL

## 2020-11-01 ENCOUNTER — APPOINTMENT (OUTPATIENT)
Dept: RADIOLOGY | Facility: CLINIC | Age: 80
End: 2020-11-01
Payer: COMMERCIAL

## 2020-11-01 VITALS
WEIGHT: 120.6 LBS | BODY MASS INDEX: 21.37 KG/M2 | RESPIRATION RATE: 16 BRPM | SYSTOLIC BLOOD PRESSURE: 156 MMHG | HEIGHT: 63 IN | DIASTOLIC BLOOD PRESSURE: 86 MMHG | TEMPERATURE: 97.6 F | OXYGEN SATURATION: 98 % | HEART RATE: 99 BPM

## 2020-11-01 DIAGNOSIS — S52.502A CLOSED FRACTURE OF DISTAL END OF LEFT RADIUS, UNSPECIFIED FRACTURE MORPHOLOGY, INITIAL ENCOUNTER: Primary | ICD-10-CM

## 2020-11-01 DIAGNOSIS — S67.92XA: ICD-10-CM

## 2020-11-01 DIAGNOSIS — S69.92XA INJURY OF LEFT WRIST, INITIAL ENCOUNTER: ICD-10-CM

## 2020-11-01 PROCEDURE — 73130 X-RAY EXAM OF HAND: CPT

## 2020-11-01 PROCEDURE — 29125 APPL SHORT ARM SPLINT STATIC: CPT | Performed by: PHYSICIAN ASSISTANT

## 2020-11-02 ENCOUNTER — EVALUATION (OUTPATIENT)
Dept: PHYSICAL THERAPY | Facility: CLINIC | Age: 80
End: 2020-11-02
Payer: COMMERCIAL

## 2020-11-02 DIAGNOSIS — Z96.611 S/P REVERSE TOTAL SHOULDER ARTHROPLASTY, RIGHT: Primary | ICD-10-CM

## 2020-11-02 PROCEDURE — 97112 NEUROMUSCULAR REEDUCATION: CPT | Performed by: PHYSICAL THERAPIST

## 2020-11-02 PROCEDURE — 97110 THERAPEUTIC EXERCISES: CPT | Performed by: PHYSICAL THERAPIST

## 2020-11-03 ENCOUNTER — OFFICE VISIT (OUTPATIENT)
Dept: OBGYN CLINIC | Facility: CLINIC | Age: 80
End: 2020-11-03
Payer: COMMERCIAL

## 2020-11-03 ENCOUNTER — APPOINTMENT (OUTPATIENT)
Dept: RADIOLOGY | Facility: CLINIC | Age: 80
End: 2020-11-03
Payer: COMMERCIAL

## 2020-11-03 VITALS
DIASTOLIC BLOOD PRESSURE: 72 MMHG | TEMPERATURE: 98.6 F | WEIGHT: 118 LBS | BODY MASS INDEX: 20.91 KG/M2 | HEIGHT: 63 IN | SYSTOLIC BLOOD PRESSURE: 128 MMHG

## 2020-11-03 DIAGNOSIS — S69.92XA WRIST INJURY, LEFT, INITIAL ENCOUNTER: ICD-10-CM

## 2020-11-03 DIAGNOSIS — S69.92XA WRIST INJURY, LEFT, INITIAL ENCOUNTER: Primary | ICD-10-CM

## 2020-11-03 DIAGNOSIS — S52.502A NONDISPLACED FRACTURE OF DISTAL END OF LEFT RADIUS: ICD-10-CM

## 2020-11-03 PROCEDURE — 99213 OFFICE O/P EST LOW 20 MIN: CPT | Performed by: ORTHOPAEDIC SURGERY

## 2020-11-03 PROCEDURE — 1036F TOBACCO NON-USER: CPT | Performed by: ORTHOPAEDIC SURGERY

## 2020-11-03 PROCEDURE — 73110 X-RAY EXAM OF WRIST: CPT

## 2020-11-03 PROCEDURE — 25600 CLTX DST RDL FX/EPHYS SEP WO: CPT | Performed by: ORTHOPAEDIC SURGERY

## 2020-11-03 PROCEDURE — 1160F RVW MEDS BY RX/DR IN RCRD: CPT | Performed by: ORTHOPAEDIC SURGERY

## 2020-11-04 ENCOUNTER — OFFICE VISIT (OUTPATIENT)
Dept: PHYSICAL THERAPY | Facility: CLINIC | Age: 80
End: 2020-11-04
Payer: COMMERCIAL

## 2020-11-04 DIAGNOSIS — Z96.611 S/P REVERSE TOTAL SHOULDER ARTHROPLASTY, RIGHT: Primary | ICD-10-CM

## 2020-11-04 PROCEDURE — 97110 THERAPEUTIC EXERCISES: CPT

## 2020-11-04 PROCEDURE — 97112 NEUROMUSCULAR REEDUCATION: CPT

## 2020-11-09 ENCOUNTER — OFFICE VISIT (OUTPATIENT)
Dept: PHYSICAL THERAPY | Facility: CLINIC | Age: 80
End: 2020-11-09
Payer: COMMERCIAL

## 2020-11-09 DIAGNOSIS — Z96.611 S/P REVERSE TOTAL SHOULDER ARTHROPLASTY, RIGHT: Primary | ICD-10-CM

## 2020-11-09 PROCEDURE — 97110 THERAPEUTIC EXERCISES: CPT

## 2020-11-09 PROCEDURE — 97112 NEUROMUSCULAR REEDUCATION: CPT

## 2020-11-11 ENCOUNTER — OFFICE VISIT (OUTPATIENT)
Dept: PHYSICAL THERAPY | Facility: CLINIC | Age: 80
End: 2020-11-11
Payer: COMMERCIAL

## 2020-11-11 DIAGNOSIS — Z96.611 S/P REVERSE TOTAL SHOULDER ARTHROPLASTY, RIGHT: Primary | ICD-10-CM

## 2020-11-11 PROCEDURE — 97110 THERAPEUTIC EXERCISES: CPT

## 2020-11-11 PROCEDURE — 97112 NEUROMUSCULAR REEDUCATION: CPT

## 2020-11-16 ENCOUNTER — OFFICE VISIT (OUTPATIENT)
Dept: PHYSICAL THERAPY | Facility: CLINIC | Age: 80
End: 2020-11-16
Payer: COMMERCIAL

## 2020-11-16 ENCOUNTER — TELEPHONE (OUTPATIENT)
Dept: OBGYN CLINIC | Facility: CLINIC | Age: 80
End: 2020-11-16

## 2020-11-16 DIAGNOSIS — Z96.611 S/P REVERSE TOTAL SHOULDER ARTHROPLASTY, RIGHT: Primary | ICD-10-CM

## 2020-11-16 PROCEDURE — 97110 THERAPEUTIC EXERCISES: CPT | Performed by: PHYSICAL THERAPIST

## 2020-11-17 ENCOUNTER — OFFICE VISIT (OUTPATIENT)
Dept: OBGYN CLINIC | Facility: CLINIC | Age: 80
End: 2020-11-17
Payer: COMMERCIAL

## 2020-11-17 VITALS — SYSTOLIC BLOOD PRESSURE: 132 MMHG | TEMPERATURE: 98.4 F | DIASTOLIC BLOOD PRESSURE: 80 MMHG

## 2020-11-17 DIAGNOSIS — S52.502A NONDISPLACED FRACTURE OF DISTAL END OF LEFT RADIUS: Primary | ICD-10-CM

## 2020-11-17 PROCEDURE — 99024 POSTOP FOLLOW-UP VISIT: CPT | Performed by: ORTHOPAEDIC SURGERY

## 2020-11-17 PROCEDURE — 29075 APPL CST ELBW FNGR SHORT ARM: CPT | Performed by: ORTHOPAEDIC SURGERY

## 2020-11-18 ENCOUNTER — OFFICE VISIT (OUTPATIENT)
Dept: PHYSICAL THERAPY | Facility: CLINIC | Age: 80
End: 2020-11-18
Payer: COMMERCIAL

## 2020-11-18 DIAGNOSIS — Z96.611 S/P REVERSE TOTAL SHOULDER ARTHROPLASTY, RIGHT: Primary | ICD-10-CM

## 2020-11-18 PROCEDURE — 97110 THERAPEUTIC EXERCISES: CPT | Performed by: PHYSICAL THERAPIST

## 2020-11-23 ENCOUNTER — OFFICE VISIT (OUTPATIENT)
Dept: PHYSICAL THERAPY | Facility: CLINIC | Age: 80
End: 2020-11-23
Payer: COMMERCIAL

## 2020-11-23 DIAGNOSIS — Z96.611 S/P REVERSE TOTAL SHOULDER ARTHROPLASTY, RIGHT: Primary | ICD-10-CM

## 2020-11-23 PROCEDURE — 97110 THERAPEUTIC EXERCISES: CPT | Performed by: PHYSICAL THERAPIST

## 2020-11-24 ENCOUNTER — TRANSCRIBE ORDERS (OUTPATIENT)
Dept: ADMINISTRATIVE | Facility: HOSPITAL | Age: 80
End: 2020-11-24

## 2020-11-24 DIAGNOSIS — Z12.31 ENCOUNTER FOR SCREENING MAMMOGRAM FOR MALIGNANT NEOPLASM OF BREAST: Primary | ICD-10-CM

## 2020-11-25 ENCOUNTER — OFFICE VISIT (OUTPATIENT)
Dept: PHYSICAL THERAPY | Facility: CLINIC | Age: 80
End: 2020-11-25
Payer: COMMERCIAL

## 2020-11-25 DIAGNOSIS — Z96.611 S/P REVERSE TOTAL SHOULDER ARTHROPLASTY, RIGHT: Primary | ICD-10-CM

## 2020-11-25 PROCEDURE — 97110 THERAPEUTIC EXERCISES: CPT | Performed by: PHYSICAL THERAPIST

## 2020-11-25 PROCEDURE — 97140 MANUAL THERAPY 1/> REGIONS: CPT | Performed by: PHYSICAL THERAPIST

## 2020-11-30 ENCOUNTER — EVALUATION (OUTPATIENT)
Dept: PHYSICAL THERAPY | Facility: CLINIC | Age: 80
End: 2020-11-30
Payer: COMMERCIAL

## 2020-11-30 DIAGNOSIS — Z96.611 S/P REVERSE TOTAL SHOULDER ARTHROPLASTY, RIGHT: Primary | ICD-10-CM

## 2020-11-30 PROCEDURE — 97110 THERAPEUTIC EXERCISES: CPT | Performed by: PHYSICAL THERAPIST

## 2020-12-02 ENCOUNTER — OFFICE VISIT (OUTPATIENT)
Dept: PHYSICAL THERAPY | Facility: CLINIC | Age: 80
End: 2020-12-02
Payer: COMMERCIAL

## 2020-12-02 DIAGNOSIS — Z96.611 S/P REVERSE TOTAL SHOULDER ARTHROPLASTY, RIGHT: Primary | ICD-10-CM

## 2020-12-02 PROCEDURE — 97112 NEUROMUSCULAR REEDUCATION: CPT | Performed by: PHYSICAL THERAPIST

## 2020-12-02 PROCEDURE — 97110 THERAPEUTIC EXERCISES: CPT | Performed by: PHYSICAL THERAPIST

## 2020-12-07 ENCOUNTER — OFFICE VISIT (OUTPATIENT)
Dept: PHYSICAL THERAPY | Facility: CLINIC | Age: 80
End: 2020-12-07
Payer: COMMERCIAL

## 2020-12-07 DIAGNOSIS — Z96.611 S/P REVERSE TOTAL SHOULDER ARTHROPLASTY, RIGHT: Primary | ICD-10-CM

## 2020-12-07 PROCEDURE — 97110 THERAPEUTIC EXERCISES: CPT

## 2020-12-07 PROCEDURE — 97112 NEUROMUSCULAR REEDUCATION: CPT

## 2020-12-09 ENCOUNTER — OFFICE VISIT (OUTPATIENT)
Dept: PHYSICAL THERAPY | Facility: CLINIC | Age: 80
End: 2020-12-09
Payer: COMMERCIAL

## 2020-12-09 DIAGNOSIS — Z96.611 S/P REVERSE TOTAL SHOULDER ARTHROPLASTY, RIGHT: Primary | ICD-10-CM

## 2020-12-09 PROCEDURE — 97110 THERAPEUTIC EXERCISES: CPT | Performed by: PHYSICAL THERAPIST

## 2020-12-09 PROCEDURE — 97112 NEUROMUSCULAR REEDUCATION: CPT | Performed by: PHYSICAL THERAPIST

## 2020-12-14 ENCOUNTER — OFFICE VISIT (OUTPATIENT)
Dept: PHYSICAL THERAPY | Facility: CLINIC | Age: 80
End: 2020-12-14
Payer: COMMERCIAL

## 2020-12-14 DIAGNOSIS — Z96.611 S/P REVERSE TOTAL SHOULDER ARTHROPLASTY, RIGHT: Primary | ICD-10-CM

## 2020-12-14 PROCEDURE — 97112 NEUROMUSCULAR REEDUCATION: CPT | Performed by: PHYSICAL THERAPIST

## 2020-12-14 PROCEDURE — 97110 THERAPEUTIC EXERCISES: CPT | Performed by: PHYSICAL THERAPIST

## 2020-12-15 ENCOUNTER — APPOINTMENT (OUTPATIENT)
Dept: RADIOLOGY | Facility: CLINIC | Age: 80
End: 2020-12-15
Payer: COMMERCIAL

## 2020-12-15 ENCOUNTER — OFFICE VISIT (OUTPATIENT)
Dept: OBGYN CLINIC | Facility: CLINIC | Age: 80
End: 2020-12-15

## 2020-12-15 ENCOUNTER — OFFICE VISIT (OUTPATIENT)
Dept: OCCUPATIONAL THERAPY | Facility: CLINIC | Age: 80
End: 2020-12-15
Payer: COMMERCIAL

## 2020-12-15 VITALS
WEIGHT: 103 LBS | SYSTOLIC BLOOD PRESSURE: 130 MMHG | DIASTOLIC BLOOD PRESSURE: 80 MMHG | TEMPERATURE: 98.2 F | HEIGHT: 63 IN | BODY MASS INDEX: 18.25 KG/M2

## 2020-12-15 DIAGNOSIS — S52.502A NONDISPLACED FRACTURE OF DISTAL END OF LEFT RADIUS: ICD-10-CM

## 2020-12-15 DIAGNOSIS — S52.502A NONDISPLACED FRACTURE OF DISTAL END OF LEFT RADIUS: Primary | ICD-10-CM

## 2020-12-15 PROCEDURE — 73110 X-RAY EXAM OF WRIST: CPT

## 2020-12-15 PROCEDURE — 99024 POSTOP FOLLOW-UP VISIT: CPT | Performed by: ORTHOPAEDIC SURGERY

## 2020-12-15 PROCEDURE — 97760 ORTHOTIC MGMT&TRAING 1ST ENC: CPT | Performed by: OCCUPATIONAL THERAPIST

## 2020-12-16 ENCOUNTER — OFFICE VISIT (OUTPATIENT)
Dept: PHYSICAL THERAPY | Facility: CLINIC | Age: 80
End: 2020-12-16
Payer: COMMERCIAL

## 2020-12-16 DIAGNOSIS — Z96.611 S/P REVERSE TOTAL SHOULDER ARTHROPLASTY, RIGHT: Primary | ICD-10-CM

## 2020-12-16 PROCEDURE — 97112 NEUROMUSCULAR REEDUCATION: CPT | Performed by: PHYSICAL THERAPIST

## 2020-12-16 PROCEDURE — 97110 THERAPEUTIC EXERCISES: CPT | Performed by: PHYSICAL THERAPIST

## 2020-12-18 DIAGNOSIS — I10 ESSENTIAL HYPERTENSION: Primary | ICD-10-CM

## 2020-12-18 RX ORDER — LISINOPRIL 40 MG/1
40 TABLET ORAL DAILY
Qty: 90 TABLET | Refills: 1 | Status: SHIPPED | OUTPATIENT
Start: 2020-12-18 | End: 2021-02-23 | Stop reason: SDUPTHER

## 2020-12-21 ENCOUNTER — OFFICE VISIT (OUTPATIENT)
Dept: PHYSICAL THERAPY | Facility: CLINIC | Age: 80
End: 2020-12-21
Payer: COMMERCIAL

## 2020-12-21 DIAGNOSIS — Z96.611 S/P REVERSE TOTAL SHOULDER ARTHROPLASTY, RIGHT: Primary | ICD-10-CM

## 2020-12-21 PROCEDURE — 97110 THERAPEUTIC EXERCISES: CPT | Performed by: PHYSICAL THERAPIST

## 2020-12-21 PROCEDURE — 97112 NEUROMUSCULAR REEDUCATION: CPT | Performed by: PHYSICAL THERAPIST

## 2020-12-23 ENCOUNTER — TELEPHONE (OUTPATIENT)
Dept: OBGYN CLINIC | Facility: CLINIC | Age: 80
End: 2020-12-23

## 2020-12-23 ENCOUNTER — OFFICE VISIT (OUTPATIENT)
Dept: PHYSICAL THERAPY | Facility: CLINIC | Age: 80
End: 2020-12-23
Payer: COMMERCIAL

## 2020-12-23 DIAGNOSIS — Z96.611 S/P REVERSE TOTAL SHOULDER ARTHROPLASTY, RIGHT: Primary | ICD-10-CM

## 2020-12-23 PROCEDURE — 97112 NEUROMUSCULAR REEDUCATION: CPT

## 2020-12-23 PROCEDURE — 97110 THERAPEUTIC EXERCISES: CPT

## 2020-12-28 ENCOUNTER — OFFICE VISIT (OUTPATIENT)
Dept: PHYSICAL THERAPY | Facility: CLINIC | Age: 80
End: 2020-12-28
Payer: COMMERCIAL

## 2020-12-28 DIAGNOSIS — Z96.611 S/P REVERSE TOTAL SHOULDER ARTHROPLASTY, RIGHT: Primary | ICD-10-CM

## 2020-12-28 PROCEDURE — 97110 THERAPEUTIC EXERCISES: CPT | Performed by: PHYSICAL THERAPIST

## 2020-12-28 PROCEDURE — 97112 NEUROMUSCULAR REEDUCATION: CPT | Performed by: PHYSICAL THERAPIST

## 2020-12-30 ENCOUNTER — EVALUATION (OUTPATIENT)
Dept: PHYSICAL THERAPY | Facility: CLINIC | Age: 80
End: 2020-12-30
Payer: COMMERCIAL

## 2020-12-30 DIAGNOSIS — Z96.611 S/P REVERSE TOTAL SHOULDER ARTHROPLASTY, RIGHT: Primary | ICD-10-CM

## 2020-12-30 PROCEDURE — 97112 NEUROMUSCULAR REEDUCATION: CPT | Performed by: PHYSICAL THERAPIST

## 2020-12-30 PROCEDURE — 97110 THERAPEUTIC EXERCISES: CPT | Performed by: PHYSICAL THERAPIST

## 2021-01-13 ENCOUNTER — OFFICE VISIT (OUTPATIENT)
Dept: OBGYN CLINIC | Facility: CLINIC | Age: 81
End: 2021-01-13

## 2021-01-13 VITALS
TEMPERATURE: 97.8 F | SYSTOLIC BLOOD PRESSURE: 120 MMHG | BODY MASS INDEX: 21.09 KG/M2 | WEIGHT: 119 LBS | DIASTOLIC BLOOD PRESSURE: 70 MMHG | HEIGHT: 63 IN

## 2021-01-13 DIAGNOSIS — S52.502A NONDISPLACED FRACTURE OF DISTAL END OF LEFT RADIUS: Primary | ICD-10-CM

## 2021-01-13 PROCEDURE — 99024 POSTOP FOLLOW-UP VISIT: CPT | Performed by: ORTHOPAEDIC SURGERY

## 2021-01-13 NOTE — ASSESSMENT & PLAN NOTE
Findings consistent with left distal radius nondisplaced fracture, healing  Discussed findings and treatment options with the patient  Patient may stop using the wrist brace  I encouraged patient to continue doing her exercises  If her burning and numbing sensation continues in a few weeks, we may have to obtain the EMG of her left upper extremity  I advised patient to contact her wrist if that happens  We will see patient back as needed  All patient's questions were answered to her satisfaction  This note is created using dictation transcription  It may contain typographical errors, grammatical errors, improperly dictated words, background noise and other errors

## 2021-01-13 NOTE — PROGRESS NOTES
Assessment:     1  Nondisplaced fracture of distal end of left radius        Plan:     Problem List Items Addressed This Visit        Musculoskeletal and Integument    Nondisplaced fracture of distal end of left radius - Primary     Findings consistent with left distal radius nondisplaced fracture, healing  Discussed findings and treatment options with the patient  Patient may stop using the wrist brace  I encouraged patient to continue doing her exercises  If her burning and numbing sensation continues in a few weeks, we may have to obtain the EMG of her left upper extremity  I advised patient to contact her wrist if that happens  We will see patient back as needed  All patient's questions were answered to her satisfaction  This note is created using dictation transcription  It may contain typographical errors, grammatical errors, improperly dictated words, background noise and other errors  Subjective:     Patient ID: Deborah Del Rosario is a 68 y o  female  Chief Complaint:  81-year-old female follow-up left distal radius fracture since 10/31/2020  Patient has been attending occupational therapy and using the wrist splint  She was experiencing burning sensation in the finger but since therapist has been treating her the symptom has been improving  She has no pain over her wrist   The burning sensation in her long and ring finger also has decreased      Allergy:  No Known Allergies  Medications:  all current active meds have been reviewed  Past Medical History:  Past Medical History:   Diagnosis Date    Arthritis     Disease of thyroid gland     thyroid nodules    Dry eye     Hard of hearing     Hematuria     Hypertension     Leaking of urine     Osteoarthritis     Peritonitis ([address] )     in age 44's due to ruptured apendix    Shoulder joint pain     right- reverse arthroplasty today 8/25/2020    Wears glasses     Wears partial dentures     upper     Past Surgical History:  Past Surgical History:   Procedure Laterality Date    APPENDECTOMY      CHOLECYSTECTOMY LAPAROSCOPIC      COLONOSCOPY      HIP SURGERY      JOINT REPLACEMENT Right     hip    LAPAROSCOPIC CHOLECYSTECTOMY      LAPAROSCOPY      UT RECONSTR TOTAL SHOULDER IMPLANT Right 2020    Procedure: ARTHROPLASTY SHOULDER REVERSE;  Surgeon: Molina Benavides MD;  Location: [facility];  Service: Orthopedics    TONSILLECTOMY      TUBAL LIGATION      WISDOM TOOTH EXTRACTION       Family History:  Family History   Problem Relation Age of Onset    Hypertension Mother     Coronary artery disease Father     Hypertension Father     No Known Problems Brother     Alcohol abuse Neg Hx     Substance Abuse Neg Hx     Mental illness Neg Hx      Social History:  Social History     Substance and Sexual Activity   Alcohol Use Yes    Alcohol/week: 1 0 standard drinks    Types: 1 Glasses of wine per week    Frequency: 2-3 times a week    Drinks per session: 1 or 2    Binge frequency: Never     Social History     Substance and Sexual Activity   Drug Use No     Social History     Tobacco Use   Smoking Status Former Smoker    Packs/day: 0 25    Years: 10 00    Pack years: 2 50    Quit date: 48    Years since quittin 0   Smokeless Tobacco Never Used     Review of Systems   Constitutional: Negative  HENT: Negative  Eyes: Negative  Respiratory: Negative  Cardiovascular: Negative  Gastrointestinal: Negative  Endocrine: Negative  Genitourinary: Negative  Musculoskeletal: Negative for arthralgias and joint swelling  Skin: Negative  Allergic/Immunologic: Negative  Neurological: Positive for numbness (Intermittent left long and ring finger)  Hematological: Negative  Psychiatric/Behavioral: Negative            Objective:  BP Readings from Last 1 Encounters:   21 120/70      Wt Readings from Last 1 Encounters:   21 54 kg (119 lb)      BMI:   Estimated body mass index is 21 08 kg/m² as calculated from the following:    Height as of this encounter: 5' 3" (1 6 m)  Weight as of this encounter: 54 kg (119 lb)  BSA:   Estimated body surface area is 1 55 meters squared as calculated from the following:    Height as of this encounter: 5' 3" (1 6 m)  Weight as of this encounter: 54 kg (119 lb)  Physical Exam  Vitals signs and nursing note reviewed  Constitutional:       Appearance: Normal appearance  She is well-developed  HENT:      Head: Normocephalic and atraumatic  Right Ear: External ear normal       Left Ear: External ear normal    Eyes:      Extraocular Movements: Extraocular movements intact  Conjunctiva/sclera: Conjunctivae normal    Neck:      Musculoskeletal: Neck supple  Pulmonary:      Effort: Pulmonary effort is normal    Skin:     General: Skin is warm and dry  Neurological:      Mental Status: She is alert and oriented to person, place, and time  Deep Tendon Reflexes: Reflexes are normal and symmetric  Psychiatric:         Mood and Affect: Mood normal          Behavior: Behavior normal        Left Hand Exam     Tenderness   The patient is experiencing no tenderness  Range of Motion   The patient has normal left wrist ROM  Muscle Strength   The patient has normal left wrist strength      Tests   Phalens Sign: negative  Tinel's sign (median nerve): negative    Other   Erythema: absent  Sensation: normal  Pulse: present            No new images for review today

## 2021-01-24 NOTE — PATIENT INSTRUCTIONS
Vaginal Atrophy   AMBULATORY CARE:   Vaginal atrophy  is a condition that causes thinning, drying, and inflammation of vaginal tissue  This condition is caused by decreased levels of estrogen (a female sex hormone)  Vaginal atrophy can increase your risk for vaginal and urinary tract infections  Vaginal atrophy can worsen over time if not treated  Common signs and symptoms include the following:   · Vaginal dryness, itching, and burning    · Vaginal discharge    · Pain or discomfort during sex    · Light bleeding after sex    · Burning during urination    · Frequent, sudden, strong urges to urinate    · Urinary incontinence (loss of control of your bladder)    Contact your healthcare provider if:   · You have a foul-smelling odor coming from your vagina  · You have a thick, cheese-like discharge from your vagina  · You have itching, swelling, or redness in your vagina  · You have pain or burning when you urinate  · Your urine smells bad  · Your symptoms do not improve, or they get worse  · You have questions or concerns about your condition or care  Treatment:   · Over-the counter vaginal moisturizers  can help reduce dryness  Your healthcare provider may recommend that you use a vaginal moisturizer several times each week and during sex  Only use creams that are made for vaginal use  Do  not  use petroleum jelly  Lubricants can be used during sex to decrease pain and discomfort  · Estrogen  may help decrease dryness  It may also lower your risk of vaginal infections if you are going through menopause  It can also help to relieve urinary symptoms  Estrogen may be prescribed in the form of a cream, tablet, or ring  These medicines can be applied or inserted into the vagina  Estrogen can also be prescribed in the form of a pill  Follow up with your healthcare provider as directed:  Write down your questions so you remember to ask them during your visits     © Copyright [employer] [address] [place] [address] Information is for [employer] use only and may not be sold, redistributed or otherwise used for commercial purposes  All illustrations and images included in CareNotes® are the copyrighted property of A D A M , Inc  or [place]  The above information is an  only  It is not intended as medical advice for individual conditions or treatments  Talk to your doctor, nurse or pharmacist before following any medical regimen to see if it is safe and effective for you  Breast Self Exam for Women   WHAT YOU NEED TO KNOW:   A BSE is a way to check your breasts for lumps and other changes  Regular BSEs can help you know how your breasts normally look and feel  Most breast lumps or changes are not cancer, but you should always have them checked by a healthcare provider  Your healthcare provider can also watch you do a BSE and can tell you if you are doing your BSE correctly  DISCHARGE INSTRUCTIONS:   Call your doctor if:   · You find any lumps or changes in your breasts  · You have breast pain or fluid coming from your nipples  · You have questions or concerns about your condition or care  Why you should do a BSE:  Breast cancer is the most common type of cancer in women  Even if you have mammograms, you may still want to do a BSE regularly  If you know how your breasts normally feel and look, it may help you know when to contact your healthcare provider  Mammograms can miss some cancers  You may find a lump during a BSE that did not show up on a mammogram   When you should do a BSE:  If you have periods, you may want to do your BSE 1 week after your period ends  This is the time when your breasts may be the least swollen, lumpy, or tender  You can do regular BSEs even if you are breastfeeding or have breast implants  How to do a BSE:       · Look at your breasts in a mirror  Look at the size and shape of each breast and nipple   Check for swelling, lumps, dimpling, scaly skin, or other skin changes  Look for nipple changes, such as a nipple that is painful or beginning to pull inward  Gently squeeze both nipples and check to see if fluid (that is not breast milk) comes out of them  If you find any of these or other breast changes, contact your healthcare provider  Check your breasts while you sit or  the following 3 positions:    ? Hang your arms down at your sides  ? Raise your hands and join them behind your head  ? Put firm pressure with your hands on your hips  Bend slightly forward while you look at your breasts in the mirror  · Lie down and feel your breasts  When you lie down, your breast tissue spreads out evenly over your chest  This makes it easier for you to feel for lumps and anything that may not be normal for your breasts  Do a BSE on one breast at a time  ? Place a small pillow or towel under your left shoulder  Put your left arm behind your head  ? Use the 3 middle fingers of your right hand  Use your fingertip pads, on the top of your fingers  Your fingertip pad is the most sensitive part of your finger  ? Use small circles to feel your breast tissue  Use your fingertip pads to make dime-sized, overlapping circles on your breast and armpits  Use light, medium, and firm pressure  First, press lightly  Second, press with medium pressure to feel a little deeper into the breast  Last, use firm pressure to feel deep within your breast     ? Examine your entire breast area  Examine the breast area from above the breast to below the breast where you feel only ribs  Make small circles with your fingertips, starting in the middle of your armpit  Make circles going up and down the breast area  Continue toward your breast and all the way across it  Examine the area from your armpit all the way over to the middle of your chest (breastbone)  Stop at the middle of your chest     ?  Move the pillow or towel to your right shoulder, and put your right arm behind your head  Use the 3 fingertip pads of your left hand, and repeat the above steps to do a BSE on your right breast   What else you can do to check for breast problems or cancer:  Talk to your healthcare provider about mammograms  A mammogram is an x-ray of your breasts to screen for breast cancer or other problems  Your provider can tell you the benefits and risks of mammograms  The first mammogram is usually at age 39 or 48  Your provider may recommend you start at 36 or younger if your risk for breast cancer is high  Mammograms usually continue every 1 to 2 years until age 76  Follow up with your doctor as directed:  Write down your questions so you remember to ask them during your visits  © Copyright [address] Information is for End User's use only and may not be sold, redistributed or otherwise used for commercial purposes  All illustrations and images included in CareNotes® are the copyrighted property of A D A M , Inc  or [facility]   The above information is an  only  It is not intended as medical advice for individual conditions or treatments  Talk to your doctor, nurse or pharmacist before following any medical regimen to see if it is safe and effective for you  Menopause   WHAT YOU NEED TO KNOW:   Menopause is a normal stage in a woman's life when her monthly periods stop  Menopause starts when the ovaries slowly stop making the female hormones estrogen and progesterone  A woman who has not had a period for a full year after the age of 39 is considered to be in menopause  Perimenopause is a stage before menopause that may cause signs and symptoms similar to menopause  Perimenopause can last an average of 4 to 5 years  DISCHARGE INSTRUCTIONS:   Follow up with your healthcare provider as directed:  Write down your questions so you remember to ask them during your visits  Self-care:   · Manage hot flashes    Hot flashes are brief periods of feeling very warm, flushed, and sweaty  Hot flashes can last from a few seconds to several minutes  They may happen many times during the day, and are common at night  Layer your clothing so that you can easily remove some clothing and cool yourself during a hot flash  Cold drinks may also be helpful  · Reduce vaginal dryness  by using over-the-counter vaginal creams  Vaginal dryness may cause you to have pain or discomfort during sex  Only use creams that are made for vaginal use  Do  not  use petroleum jelly  You may need an estrogen cream to put in and around your vagina  Estrogen cream may help decrease vaginal dryness and lower your risk of vaginal infections  · Continue to use birth control  during perimenopause if you do not want to get pregnant  You may need to use birth control until it has been 1 year since your periods stopped  Ask your healthcare provider when you can stop using birth control to prevent pregnancy  Lead a healthy lifestyle:  After menopause, your risk for heart disease and bone loss increases  Ask about these and other ways to stay healthy:  · Exercise regularly  Exercise helps you maintain a healthy weight  Exercise can also help to control your blood pressure and cholesterol levels  Include weight-bearing exercise for strong bones  Ask your healthcare provider about the best exercise plan for you  · Eat a variety of healthy foods  Include fruits, vegetables, whole grains (whole-wheat bread, pasta, and cereals), low-fat dairy, and lean protein foods (beans, poultry, and fish)  Limit foods high in sodium (salt)  Ask your healthcare provider for more information about a meal plan that is right for you  · Maintain a healthy weight  Check with your healthcare provider before you start any weight loss program      · Take supplements as directed  You may need extra calcium and vitamin D to help prevent osteoporosis  · Limit alcohol and caffeine    Alcohol and caffeine may worsen your symptoms  · Do not smoke  If you smoke, it is never too late to quit  You are more likely to have a heart attack, lung disease, blood clots, and cancer if you smoke  Ask your healthcare provider for information if you need help quitting  Contact your healthcare provider if:   · You have vaginal bleeding after menopause  · You have questions or concerns about your condition or care  © 2017 [address] Information is for End User's use only and may not be sold, redistributed or otherwise used for commercial purposes  All illustrations and images included in CareNotes® are the copyrighted property of [employer] M , Inc  or Timo Elizondo  The above information is an  only  It is not intended as medical advice for individual conditions or treatments  Talk to your doctor, nurse or pharmacist before following any medical regimen to see if it is safe and effective for you  Kegel Exercises for Women   AMBULATORY CARE:   Kegel exercises  help strengthen your pelvic muscles  Pelvic muscles hold your pelvic organs, such as your bladder and uterus, in place  Kegel exercises help prevent or control problems with urine incontinence (leakage)  Incontinence may be caused by pregnancy, childbirth, or menopause  Contact your healthcare provider if:   · You cannot feel your muscles tighten or relax  · You continue to leak urine  · You have questions or concerns about your condition or care  Use the correct muscles:  Pelvic muscles are the muscles you use to control urine flow  To target these muscles, stop and start the flow of urine several times  This will help you become familiar with how it feels to tighten and relax these muscles  How to do Kegel exercises:   · Empty your bladder  You may lie down, stand up, or sit down to do these exercises  When you first try to do these exercises, it may be easier if you lie down  Tighten or squeeze your pelvic muscles slowly   It may feel like you are trying to hold back urine or gas  Hold this position for 3 seconds  Relax for 3 seconds  Repeat this cycle 10 times  · Do 10 sets of Kegel exercises, at least 3 times a day  Do not hold your breath when you do Kegel exercises  Keep your stomach, back, and leg muscles relaxed  · As your muscles get stronger, you will be able to hold the squeeze longer  Your healthcare provider may ask that you increase your pelvic muscle squeeze to 10 seconds  After you squeeze for 10 seconds, relax for 10 seconds  What else you should know:   · Once you know how to do Kegel exercises, use different positions  You can do these exercises while you lie on the floor, sit at your desk or watch TV, and while you stand  · You may notice improved bladder control within about 6 weeks  · Tighten your pelvic muscles before you sneeze, cough, or lift to prevent urine leakage  Follow up with your healthcare provider as directed:  Write down your questions so you remember to ask them during your visits  © Copyright [address] Information is for End User's use only and may not be sold, redistributed or otherwise used for commercial purposes  All illustrations and images included in CareNotes® are the copyrighted property of A D A M , Inc  or [address] [place] [address]  The above information is an  only  It is not intended as medical advice for individual conditions or treatments  Talk to your doctor, nurse or pharmacist before following any medical regimen to see if it is safe and effective for you

## 2021-01-24 NOTE — PROGRESS NOTES
Assessment        Diagnoses and all orders for this visit:    Encntr for gyn exam (general) (routine) w abnormal findings    Vaginal atrophy  -     Discontinue: estradiol (ESTRACE) 0 1 mg/g vaginal cream; Insert 0 5 g into the vagina daily for 14 days, THEN 1 g 2 (two) times a week  -     estradiol (ESTRACE) 0 1 mg/g vaginal cream; Insert 0 5 g into the vagina daily for 14 days, THEN 1 g 2 (two) times a week  Mixed incontinence    Cervical cancer screening  -     Liquid-based pap, screening    Other orders  -     doxycycline hyclate (VIBRAMYCIN) 50 mg capsule; TAKE 1 CAPSULE (50 MG TOTAL) BY MOUTH ONCE A WEEK  Plan      All questions answered  Await pap smear results  Breast self exam technique reviewed and patient encouraged to perform self-exam monthly  Discussed healthy lifestyle modifications  Educational material distributed  Follow up in 1 year  Follow up as needed  Mammogram   Thin prep Pap smear  BOYD       Reviewed ASCCP recommendations for cervical cytology with patient  It is recommended to start screening at age of 24  Women aged 21-29 should be screened with cytology alone every 3 years  Women aged 33-67 should be screened with cytology with HPV co-testing every 5 years or cytology alone every 3 years  Women older than 72 do not need screening if they had adequate negative screening in the past (3 consecutive negative cytology or 2 negative co-tests) 10 years  Women who underwent total hysterectomy for benign indications and do no have a history of MITUL 2 or higher do not need cervical cytology screening  No records of previous PAP prior to 2019 and so PAP was done, pt also requests PAP    Continue vaginal estrogen for now, Premarin more expensive and switched to Estrace daily x 2 weeks then 2x/week and symptoms of hematuria, urinary leakage may be due to genitourinary syndrome of menopause  Advised lubricants prn intercourse      Advised Kegels exercises, to keep urology appt  Mammo and DEXA ordered by PCP    Advised to obtain to colonoscopy records, offered COLOGARD when due    Subjective      Gato Dinh is a 68 y o  female who presents for annual exam       Chief Complaint   Patient presents with   [facility] Gynecologic Exam     Patient here for yv, C/O urine leakage  Last Pap- 2019 Negative     She is having urinary leakage, she is seeing urologist [facility] Dr Demetrio Siddiqi  She leaks with laughing, sneezing but also has urgency and occasionally leaks before she makes to the bathroom  She walks around a lot  She recently had should replacement and also had an arm fracture  She had CT scan for hematuria  She has US of bladder  Postmenopausal Bleeding no  Vaginal Dryness yes  Hot flashes no  Sexually active yes  But difficult due to vaginal dryness/tightness/erectile dysfunction      She is using Premarin cream twice weekly  Last Pap: 19  Last mammogram: scheduled for 2/3/21  Colorectal cancer screening: less than 10 years ago, declined Chloé Kramer, advised to obtain records  DEXA: scheduled for 2/3/21      Current contraception: post menopausal status  History of abnormal Pap smear: no  History of abnormal mammogram: no  Family history of uterine or ovarian cancer: no  Family history of breast cancer: no  Family history of colon cancer: no        Menstrual History:  OB History        2    Para   2    Term   2       0    AB   0    Living   2       SAB   0    TAB   0    Ectopic   0    Multiple   0    Live Births   2                Menarche age:   No LMP recorded   Patient is postmenopausal          Past Medical History:   Diagnosis Date    Arthritis     Disease of thyroid gland     thyroid nodules    Dry eye     Hard of hearing     Hematuria     Hypertension     Leaking of urine     Osteoarthritis     Peritonitis ([address] )     in age 44's due to ruptured apendix    Shoulder joint pain     right- reverse arthroplasty today 2020    Wears glasses     Wears partial dentures     upper     Past Surgical History:   Procedure Laterality Date    APPENDECTOMY      CHOLECYSTECTOMY LAPAROSCOPIC      COLONOSCOPY      HIP SURGERY      JOINT REPLACEMENT Right     hip    LAPAROSCOPIC CHOLECYSTECTOMY      LAPAROSCOPY      DE RECONSTR TOTAL SHOULDER IMPLANT Right 2020    Procedure: ARTHROPLASTY SHOULDER REVERSE;  Surgeon: German Sanderson MD;  Location: [facility];  Service: Orthopedics    TONSILLECTOMY      TUBAL LIGATION      WISDOM TOOTH EXTRACTION       Family History   Problem Relation Age of Onset    Hypertension Mother     Coronary artery disease Father     Hypertension Father     Hyperlipidemia Father     No Known Problems Brother     No Known Problems Daughter     No Known Problems Son     Alcohol abuse Neg Hx     Substance Abuse Neg Hx     Mental illness Neg Hx        Social History     Tobacco Use    Smoking status: Former Smoker     Packs/day: 0 25     Years: 10 00     Pack years: 2 50     Quit date:      Years since quittin 0    Smokeless tobacco: Never Used   Substance Use Topics    Alcohol use:  Yes     Alcohol/week: 1 0 standard drinks     Types: 1 Glasses of wine per week     Frequency: 2-3 times a week     Drinks per session: 1 or 2     Binge frequency: Never    Drug use: Never          Current Outpatient Medications:     Cholecalciferol (VITAMIN D) 50 MCG (2000 UT) tablet, Take 2,000 Units by mouth daily, Disp: , Rfl:     conjugated estrogens (PREMARIN) vaginal cream, Insert 0 5 g into the vagina 2 (two) times a week, Disp: 30 g, Rfl: 6    doxycycline (DORYX) 100 MG EC tablet, Take 100 mg by mouth once a week , Disp: , Rfl:     lisinopril (ZESTRIL) 40 mg tablet, Take 1 tablet (40 mg total) by mouth daily, Disp: 90 tablet, Rfl: 1    Multiple Vitamins-Minerals (MULTIVITAMIN WOMEN PO), Take 1 tablet by mouth daily , Disp: , Rfl:     doxycycline hyclate (VIBRAMYCIN) 50 mg capsule, TAKE 1 CAPSULE (50 MG TOTAL) BY MOUTH ONCE A WEEK , Disp: , Rfl:     estradiol (ESTRACE) 0 1 mg/g vaginal cream, Insert 0 5 g into the vagina daily for 14 days, THEN 1 g 2 (two) times a week , Disp: 42 5 g, Rfl: 4    No Known Allergies        Review of Systems   Constitutional: Negative  HENT: Negative  Eyes: Negative  Respiratory: Negative  Cardiovascular: Negative  Gastrointestinal: Negative  Endocrine: Negative  Genitourinary:        As noted in HPI   Musculoskeletal: Negative  Skin: Negative  Allergic/Immunologic: Negative  Neurological: Negative  Hematological: Negative  Psychiatric/Behavioral: Negative  /78 (BP Location: Right arm, Patient Position: Sitting, Cuff Size: Standard)   Pulse 78   Temp 98 2 °F (36 8 °C) (Temporal)   Ht 5' 3" (1 6 m)   Wt 54 2 kg (119 lb 6 4 oz)   BMI 21 15 kg/m²         Physical Exam  Constitutional:       Appearance: She is well-developed  Genitourinary:      Pelvic exam was performed with patient in the lithotomy position  Vulva, urethra, bladder, vagina, uterus and rectum normal       No vulval condylomata, lesion, tenderness, Bartholin's cyst or rash noted  No signs of labial injury  No posterior fourchette tenderness, injury, rash or lesion present  No inguinal adenopathy present in the right or left side  No lesions in the vagina  Vaginal atrophy and atrophic mucosa present  No vaginal discharge, tenderness, bleeding, rugosity or prolapse  No cervical motion tenderness, friability, lesion or polyp  Uterus is not enlarged or tender  No right or left adnexal mass present  Right adnexa not tender or full  Left adnexa not tender or full  Genitourinary Comments:   Cervix flushed   Rectum:      No external hemorrhoid  HENT:      Head: Normocephalic  Nose: Nose normal    Eyes:      Conjunctiva/sclera: Conjunctivae normal    Neck:      Musculoskeletal: Neck supple   No muscular tenderness  Thyroid: No thyromegaly  Cardiovascular:      Rate and Rhythm: Normal rate and regular rhythm  Heart sounds: Normal heart sounds  No murmur  Pulmonary:      Effort: Pulmonary effort is normal  No respiratory distress  Breath sounds: Normal breath sounds  No wheezing or rales  Chest:      Breasts:         Right: No mass, nipple discharge, skin change or tenderness  Left: No mass, nipple discharge, skin change or tenderness  Abdominal:      General: There is no distension  Palpations: Abdomen is soft  There is no mass  Tenderness: There is no abdominal tenderness  There is no guarding or rebound  Musculoskeletal:         General: No tenderness  Lymphadenopathy:      Cervical: No cervical adenopathy  Lower Body: No right inguinal adenopathy  No left inguinal adenopathy  Neurological:      Mental Status: She is alert and oriented to person, place, and time  Skin:     General: Skin is warm and dry     Psychiatric:         Mood and Affect: Mood normal          Behavior: Behavior normal

## 2021-01-26 ENCOUNTER — ANNUAL EXAM (OUTPATIENT)
Dept: OBGYN CLINIC | Facility: CLINIC | Age: 81
End: 2021-01-26
Payer: COMMERCIAL

## 2021-01-26 VITALS
WEIGHT: 119.4 LBS | HEART RATE: 78 BPM | BODY MASS INDEX: 21.16 KG/M2 | SYSTOLIC BLOOD PRESSURE: 132 MMHG | DIASTOLIC BLOOD PRESSURE: 78 MMHG | TEMPERATURE: 98.2 F | HEIGHT: 63 IN

## 2021-01-26 DIAGNOSIS — N95.2 VAGINAL ATROPHY: ICD-10-CM

## 2021-01-26 DIAGNOSIS — Z01.411 ENCNTR FOR GYN EXAM (GENERAL) (ROUTINE) W ABNORMAL FINDINGS: Primary | ICD-10-CM

## 2021-01-26 DIAGNOSIS — N39.46 MIXED INCONTINENCE: ICD-10-CM

## 2021-01-26 DIAGNOSIS — Z12.4 CERVICAL CANCER SCREENING: ICD-10-CM

## 2021-01-26 PROCEDURE — G0101 CA SCREEN;PELVIC/BREAST EXAM: HCPCS | Performed by: OBSTETRICS & GYNECOLOGY

## 2021-01-26 PROCEDURE — G0145 SCR C/V CYTO,THINLAYER,RESCR: HCPCS | Performed by: OBSTETRICS & GYNECOLOGY

## 2021-01-26 RX ORDER — DOXYCYCLINE HYCLATE 50 MG/1
CAPSULE ORAL
COMMUNITY
Start: 2020-12-22 | End: 2021-10-04 | Stop reason: ALTCHOICE

## 2021-01-26 RX ORDER — ESTRADIOL 0.1 MG/G
CREAM VAGINAL
Qty: 42.5 G | Refills: 4 | Status: SHIPPED | OUTPATIENT
Start: 2021-01-26 | End: 2021-01-26

## 2021-01-26 RX ORDER — ESTRADIOL 0.1 MG/G
CREAM VAGINAL
Qty: 42.5 G | Refills: 4 | Status: SHIPPED | OUTPATIENT
Start: 2021-01-26 | End: 2022-03-31 | Stop reason: SDUPTHER

## 2021-02-03 ENCOUNTER — HOSPITAL ENCOUNTER (OUTPATIENT)
Dept: MAMMOGRAPHY | Facility: IMAGING CENTER | Age: 81
Discharge: HOME | End: 2021-02-03
Payer: COMMERCIAL

## 2021-02-03 ENCOUNTER — HOSPITAL ENCOUNTER (OUTPATIENT)
Dept: BONE DENSITY | Facility: IMAGING CENTER | Age: 81
Discharge: HOME | End: 2021-02-03
Payer: COMMERCIAL

## 2021-02-03 VITALS — HEIGHT: 63 IN | BODY MASS INDEX: 20.91 KG/M2 | WEIGHT: 118 LBS

## 2021-02-03 DIAGNOSIS — Z12.31 ENCOUNTER FOR SCREENING MAMMOGRAM FOR MALIGNANT NEOPLASM OF BREAST: ICD-10-CM

## 2021-02-03 DIAGNOSIS — Z13.820 SCREENING FOR OSTEOPOROSIS: ICD-10-CM

## 2021-02-03 DIAGNOSIS — Z78.0 POSTMENOPAUSAL: ICD-10-CM

## 2021-02-03 LAB
LAB AP GYN PRIMARY INTERPRETATION: NORMAL
Lab: NORMAL

## 2021-02-03 PROCEDURE — 77067 SCR MAMMO BI INCL CAD: CPT

## 2021-02-03 PROCEDURE — 77080 DXA BONE DENSITY AXIAL: CPT

## 2021-02-03 PROCEDURE — 77063 BREAST TOMOSYNTHESIS BI: CPT

## 2021-02-08 ENCOUNTER — HOSPITAL ENCOUNTER (OUTPATIENT)
Dept: MAMMOGRAPHY | Facility: CLINIC | Age: 81
Discharge: HOME | End: 2021-02-08
Payer: COMMERCIAL

## 2021-02-08 DIAGNOSIS — R92.8 ABNORMAL MAMMOGRAM: ICD-10-CM

## 2021-02-08 PROCEDURE — 77065 DX MAMMO INCL CAD UNI: CPT

## 2021-02-08 NOTE — PROGRESS NOTES
Met with patient and Dr Elliot Saucedo  regarding recommendation for;    __X___ RIGHT ______LEFT      _____Ultrasound guided  ___X___Stereotactic breast biopsy  __X___Verbalized understanding        Blood thinners:  ___X__yes _____no (ASA PRN)    Date stopped: ___N/A____    Biopsy teaching sheet given:  __X___yes ____no    Pt given contact information and adv to call with any questions/needs

## 2021-02-15 ENCOUNTER — HOSPITAL ENCOUNTER (OUTPATIENT)
Dept: MAMMOGRAPHY | Facility: CLINIC | Age: 81
Discharge: HOME | End: 2021-02-15
Payer: COMMERCIAL

## 2021-02-15 VITALS
HEIGHT: 65 IN | WEIGHT: 118 LBS | BODY MASS INDEX: 19.66 KG/M2 | SYSTOLIC BLOOD PRESSURE: 148 MMHG | HEART RATE: 70 BPM | DIASTOLIC BLOOD PRESSURE: 83 MMHG

## 2021-02-15 DIAGNOSIS — R92.8 ABNORMAL MAMMOGRAM: ICD-10-CM

## 2021-02-15 PROCEDURE — 88305 TISSUE EXAM BY PATHOLOGIST: CPT | Performed by: PATHOLOGY

## 2021-02-15 PROCEDURE — 19081 BX BREAST 1ST LESION STRTCTC: CPT

## 2021-02-15 RX ORDER — LIDOCAINE HYDROCHLORIDE AND EPINEPHRINE 20; 5 MG/ML; UG/ML
10 INJECTION, SOLUTION EPIDURAL; INFILTRATION; INTRACAUDAL; PERINEURAL ONCE
Status: COMPLETED | OUTPATIENT
Start: 2021-02-15 | End: 2021-02-15

## 2021-02-15 RX ORDER — LIDOCAINE HYDROCHLORIDE 10 MG/ML
5 INJECTION, SOLUTION EPIDURAL; INFILTRATION; INTRACAUDAL; PERINEURAL ONCE
Status: COMPLETED | OUTPATIENT
Start: 2021-02-15 | End: 2021-02-15

## 2021-02-15 RX ADMIN — LIDOCAINE HYDROCHLORIDE 5 ML: 10 INJECTION, SOLUTION EPIDURAL; INFILTRATION; INTRACAUDAL; PERINEURAL at 13:10

## 2021-02-15 RX ADMIN — LIDOCAINE HYDROCHLORIDE,EPINEPHRINE BITARTRATE 10 ML: 20; .005 INJECTION, SOLUTION EPIDURAL; INFILTRATION; INTRACAUDAL; PERINEURAL at 13:10

## 2021-02-15 NOTE — DISCHARGE INSTR - OTHER ORDERS
POST LARGE CORE BREAST BIOPSY PATIENT INFORMATION      1  Place an ice pack inside your bra over the top of the dressing every hour for 20 minutes (20 minutes on, 60 minutes off)  Do this until bedtime  2  Do not shower or bathe until the following morning  3  You may bathe your breast carefully with the steri-strips in place  Be careful    Not to loosen them  The steri-strips will fall off in 3-5 days  4  You may have mild discomfort, and you may have some bruising where the   Needle entered the skin  This should clear within 5-7 days  5  If you need medicine for discomfort, take acetaminophen products such as   Tylenol  You may also take Advil or Motrin products  6  Do not participate in strenuous activities such as-tennis, aerobics, skiing,  Weight lifting, etc  for 24 hours  Refrain from swimming/soaking for 72 hours  7  Wearing a bra for sleeping may be more comfortable for the first 24-48 hours  8  Watch for continued bleeding, pain or fever over 101; please call with any questions or concerns  For procedures done at the [facility] "[facility] call:  Alexa Cook RN at [facility] RN at [phone]                    *After 4 PM call the Interventional Radiology Department                    [phone] and ask to speak with the nurse on call  For procedures done at the [address] [place] [address] call:         Claudy Hercules RN at   *After 4 PM call the Interventional Radiology Department   [phone] and ask to speak with the nurse on call  For procedures done at [address] [place] [address] call: The Radiology Nurse at [phone]  *After 4 PM call your physician, or go to the Emergency Department  9          The final results of your biopsy are usually available within one week

## 2021-02-15 NOTE — PROGRESS NOTES
Ice pack given:    ___X__yes _____no    Discharge instructions signed by patient:    ___X__yes _____no    Discharge instructions given to patient:    __X___yes _____no    Discharged via:    _X____amulatory    _____wheelchair    _____stretcher    Stable on discharge:    ___X__yes ____no

## 2021-02-15 NOTE — PROGRESS NOTES
Procedure type:    _____ultrasound guided __X___stereotactic    Breast:    _____Left ___X__Right    Location: 9 o'clock 4 cm from nipple     Needle: 8 gauge     # of passes: 3; 2 with calcs and 1 w/o calcs    Clip: Triple twist     Performed by: Dr Starlyn Mcburney held for 5 minutes by: Sherri CLEARY    Stersuha Strips:    __X___yes _____no    Band aid:    __X___yes_____no    Tape and guaze:    __X___yes _____no    Tolerated procedure:    __X___yes _____no

## 2021-02-16 NOTE — PROGRESS NOTES
Post procedure call completed    Bleeding: _____yes __X___no    Pain: _____yes ___X___no Mild discomfort last night  Took tylenol x1  No pain today    Redness/Swelling: ______yes ___X___no    Band aid removed: __X___yes _____no  Instructed patient to remove band aid after her shower  Steri-Strips intact: ___X___yes _____no (discussed with patient to remove steri strips on 2/21/21    if they have not come off on their own)    Pt with no questions at this time, adv will call when results available, adv to call with any questions or concerns, has name/# for contact

## 2021-02-17 ENCOUNTER — TELEPHONE (OUTPATIENT)
Dept: MAMMOGRAPHY | Facility: CLINIC | Age: 81
End: 2021-02-17

## 2021-02-17 NOTE — NURSING NOTE
I just wanted to let you know that the above patient was given her negative breast biopsy results  The patient had no questions and was advised to return to yearly screenings  If you have any questions or need further assistance please let me know      Thank you,  [place] RN  Breast Nurse Navigator

## 2021-02-23 ENCOUNTER — OFFICE VISIT (OUTPATIENT)
Dept: FAMILY MEDICINE CLINIC | Facility: CLINIC | Age: 81
End: 2021-02-23
Payer: COMMERCIAL

## 2021-02-23 VITALS
RESPIRATION RATE: 16 BRPM | HEIGHT: 63 IN | WEIGHT: 118.8 LBS | TEMPERATURE: 97.4 F | DIASTOLIC BLOOD PRESSURE: 80 MMHG | HEART RATE: 80 BPM | SYSTOLIC BLOOD PRESSURE: 140 MMHG | BODY MASS INDEX: 21.05 KG/M2

## 2021-02-23 DIAGNOSIS — M25.511 RIGHT SHOULDER PAIN, UNSPECIFIED CHRONICITY: ICD-10-CM

## 2021-02-23 DIAGNOSIS — I10 ESSENTIAL HYPERTENSION: ICD-10-CM

## 2021-02-23 DIAGNOSIS — E87.1 HYPONATREMIA: ICD-10-CM

## 2021-02-23 DIAGNOSIS — E04.1 THYROID NODULE: ICD-10-CM

## 2021-02-23 DIAGNOSIS — M81.0 AGE-RELATED OSTEOPOROSIS WITHOUT CURRENT PATHOLOGICAL FRACTURE: ICD-10-CM

## 2021-02-23 DIAGNOSIS — Z00.00 MEDICARE ANNUAL WELLNESS VISIT, INITIAL: Primary | ICD-10-CM

## 2021-02-23 PROCEDURE — 3288F FALL RISK ASSESSMENT DOCD: CPT | Performed by: FAMILY MEDICINE

## 2021-02-23 PROCEDURE — 1125F AMNT PAIN NOTED PAIN PRSNT: CPT | Performed by: FAMILY MEDICINE

## 2021-02-23 PROCEDURE — 3725F SCREEN DEPRESSION PERFORMED: CPT | Performed by: FAMILY MEDICINE

## 2021-02-23 PROCEDURE — 99213 OFFICE O/P EST LOW 20 MIN: CPT | Performed by: FAMILY MEDICINE

## 2021-02-23 PROCEDURE — G0439 PPPS, SUBSEQ VISIT: HCPCS | Performed by: FAMILY MEDICINE

## 2021-02-23 PROCEDURE — 1170F FXNL STATUS ASSESSED: CPT | Performed by: FAMILY MEDICINE

## 2021-02-23 RX ORDER — LISINOPRIL 40 MG/1
40 TABLET ORAL DAILY
Qty: 90 TABLET | Refills: 3 | Status: SHIPPED | OUTPATIENT
Start: 2021-02-23 | End: 2022-04-05 | Stop reason: SDUPTHER

## 2021-02-23 RX ORDER — ALENDRONATE SODIUM 70 MG/1
70 TABLET ORAL
Qty: 4 TABLET | Refills: 1 | Status: SHIPPED | OUTPATIENT
Start: 2021-02-23 | End: 2021-03-18

## 2021-02-23 NOTE — PATIENT INSTRUCTIONS
Medicare Preventive Visit Patient Instructions  Thank you for completing your Welcome to Medicare Visit or Medicare Annual Wellness Visit today  Your next wellness visit will be due in one year (2/23/2022)  The screening/preventive services that you may require over the next 5-10 years are detailed below  Some tests may not apply to you based off risk factors and/or age  Screening tests ordered at today's visit but not completed yet may show as past due  Also, please note that scanned in results may not display below  Preventive Screenings:  Service Recommendations Previous Testing/Comments   Colorectal Cancer Screening  * Colonoscopy    * Fecal Occult Blood Test (FOBT)/Fecal Immunochemical Test (FIT)  * Fecal DNA/Cologuard Test  * Flexible Sigmoidoscopy Age: 54-65 years old   Colonoscopy: every 10 years (may be performed more frequently if at higher risk)  OR  FOBT/FIT: every 1 year  OR  Cologuard: every 3 years  OR  Sigmoidoscopy: every 5 years  Screening may be recommended earlier than age 48 if at higher risk for colorectal cancer  Also, an individualized decision between you and your healthcare provider will decide whether screening between the ages of 74-80 would be appropriate  Colonoscopy: Not on file  FOBT/FIT: Not on file  Cologuard: Not on file  Sigmoidoscopy: Not on file         Breast Cancer Screening Age: 36 years old  Frequency: every 1-2 years  Not required if history of left and right mastectomy Mammogram: 02/08/2021    Screening Current   Cervical Cancer Screening Between the ages of 21-29, pap smear recommended once every 3 years  Between the ages of 33-67, can perform pap smear with HPV co-testing every 5 years     Recommendations may differ for women with a history of total hysterectomy, cervical cancer, or abnormal pap smears in past  Pap Smear: 01/26/2021    Screening Not Indicated   Hepatitis C Screening Once for adults born between 1945 and 1965  More frequently in patients at high risk for Hepatitis C Hep C Antibody: Not on file       Diabetes Screening 1-2 times per year if you're at risk for diabetes or have pre-diabetes Fasting glucose: 98 mg/dL   A1C: 5 4 %    Screening Current   Cholesterol Screening Once every 5 years if you don't have a lipid disorder  May order more often based on risk factors  Lipid panel: Not on file         Other Preventive Screenings Covered by Medicare:  1  Abdominal Aortic Aneurysm (AAA) Screening: covered once if your at risk  You're considered to be at risk if you have a family history of AAA  2  Lung Cancer Screening: covers low dose CT scan once per year if you meet all of the following conditions: (1) Age 50-69; (2) No signs or symptoms of lung cancer; (3) Current smoker or have quit smoking within the last 15 years; (4) You have a tobacco smoking history of at least 30 pack years (packs per day multiplied by number of years you smoked); (5) You get a written order from a healthcare provider  3  Glaucoma Screening: covered annually if you're considered high risk: (1) You have diabetes OR (2) Family history of glaucoma OR (3)  aged 48 and older OR (3)  American aged 72 and older  3  Osteoporosis Screening: covered every 2 years if you meet one of the following conditions: (1) You're estrogen deficient and at risk for osteoporosis based off medical history and other findings; (2) Have a vertebral abnormality; (3) On glucocorticoid therapy for more than 3 months; (4) Have primary hyperparathyroidism; (5) On osteoporosis medications and need to assess response to drug therapy  · Last bone density test (DXA Scan): 02/03/2021   5  HIV Screening: covered annually if you're between the age of 15-65  Also covered annually if you are younger than 13 and older than 72 with risk factors for HIV infection  For pregnant patients, it is covered up to 3 times per pregnancy      Immunizations:  Immunization Recommendations   Influenza Vaccine Annual influenza vaccination during flu season is recommended for all persons aged >= 6 months who do not have contraindications   Pneumococcal Vaccine (Prevnar and Pneumovax)  * Prevnar = PCV13  * Pneumovax = PPSV23   Adults 25-60 years old: 1-3 doses may be recommended based on certain risk factors  Adults 72 years old: Prevnar (PCV13) vaccine recommended followed by Pneumovax (PPSV23) vaccine  If already received PPSV23 since turning 65, then PCV13 recommended at least one year after PPSV23 dose  Hepatitis B Vaccine 3 dose series if at intermediate or high risk (ex: diabetes, end stage renal disease, liver disease)   Tetanus (Td) Vaccine - COST NOT COVERED BY MEDICARE PART B Following completion of primary series, a booster dose should be given every 10 years to maintain immunity against tetanus  Td may also be given as tetanus wound prophylaxis  Tdap Vaccine - COST NOT COVERED BY MEDICARE PART B Recommended at least once for all adults  For pregnant patients, recommended with each pregnancy  Shingles Vaccine (Shingrix) - COST NOT COVERED BY MEDICARE PART B  2 shot series recommended in those aged 48 and above     Health Maintenance Due:      Topic Date Due    Cervical Cancer Screening  01/26/2023    DXA SCAN  02/03/2023     Immunizations Due:      Topic Date Due    DTaP,Tdap,and Td Vaccines (1 - Tdap) 04/16/1961    Pneumococcal Vaccine: 65+ Years (1 of 1 - PPSV23) 04/16/2007    Influenza Vaccine (1) 09/01/2020     Advance Directives   What are advance directives? Advance directives are legal documents that state your wishes and plans for medical care  These plans are made ahead of time in case you lose your ability to make decisions for yourself  Advance directives can apply to any medical decision, such as the treatments you want, and if you want to donate organs  What are the types of advance directives? There are many types of advance directives, and each state has rules about how to use them  You may choose a combination of any of the following:  · Living will: This is a written record of the treatment you want  You can also choose which treatments you do not want, which to limit, and which to stop at a certain time  This includes surgery, medicine, IV fluid, and tube feedings  · Durable power of  for healthcare [facility]): This is a written record that states who you want to make healthcare choices for you when you are unable to make them for yourself  This person, called a proxy, is usually a family member or a friend  You may choose more than 1 proxy  · Do not resuscitate (DNR) order:  A DNR order is used in case your heart stops beating or you stop breathing  It is a request not to have certain forms of treatment, such as CPR  A DNR order may be included in other types of advance directives  · Medical directive: This covers the care that you want if you are in a coma, near death, or unable to make decisions for yourself  You can list the treatments you want for each condition  Treatment may include pain medicine, surgery, blood transfusions, dialysis, IV or tube feedings, and a ventilator (breathing machine)  · Values history: This document has questions about your views, beliefs, and how you feel and think about life  This information can help others choose the care that you would choose  Why are advance directives important? An advance directive helps you control your care  Although spoken wishes may be used, it is better to have your wishes written down  Spoken wishes can be misunderstood, or not followed  Treatments may be given even if you do not want them  An advance directive may make it easier for your family to make difficult choices about your care  Fall Prevention    Fall prevention  includes ways to make your home and other areas safer  It also includes ways you can move more carefully to prevent a fall   Health conditions that cause changes in your blood pressure, vision, or muscle strength and coordination may increase your risk for falls  Medicines may also increase your risk for falls if they make you dizzy, weak, or sleepy  Fall prevention tips:   · Stand or sit up slowly  · Use assistive devices as directed  · Wear shoes that fit well and have soles that   · Wear a personal alarm  · Stay active  · Manage your medical conditions  Home Safety Tips:  · Add items to prevent falls in the bathroom  · Keep paths clear  · Install bright lights in your home  · Keep items you use often on shelves within reach  · Paint or place reflective tape on the edges of your stairs  Urinary Incontinence   Urinary incontinence (UI)  is when you lose control of your bladder  UI develops because your bladder cannot store or empty urine properly  The 3 most common types of UI are stress incontinence, urge incontinence, or both  Medicines:   · May be given to help strengthen your bladder control  Report any side effects of medication to your healthcare provider  Do pelvic muscle exercises often:  Your pelvic muscles help you stop urinating  Squeeze these muscles tight for 5 seconds, then relax for 5 seconds  Gradually work up to squeezing for 10 seconds  Do 3 sets of 15 repetitions a day, or as directed  This will help strengthen your pelvic muscles and improve bladder control  Train your bladder:  Go to the bathroom at set times, such as every 2 hours, even if you do not feel the urge to go  You can also try to hold your urine when you feel the urge to go  For example, hold your urine for 5 minutes when you feel the urge to go  As that becomes easier, hold your urine for 10 minutes  Self-care:   · Keep a UI record  Write down how often you leak urine and how much you leak  Make a note of what you were doing when you leaked urine  · Drink liquids as directed  You may need to limit the amount of liquid you drink to help control your urine leakage   Do not drink any liquid right before you go to bed  Limit or do not have drinks that contain caffeine or alcohol  · Prevent constipation  Eat a variety of high-fiber foods  Good examples are high-fiber cereals, beans, vegetables, and whole-grain breads  Walking is the best way to trigger your intestines to have a bowel movement  · Exercise regularly and maintain a healthy weight  Weight loss and exercise will decrease pressure on your bladder and help you control your leakage  · Use a catheter as directed  to help empty your bladder  A catheter is a tiny, plastic tube that is put into your bladder to drain your urine  · Go to behavior therapy as directed  Behavior therapy may be used to help you learn to control your urge to urinate  © Copyright [employer] 2018 Information is for End User's use only and may not be sold, redistributed or otherwise used for commercial purposes  All illustrations and images included in CareNotes® are the copyrighted property of A D A M , Inc  or [facility] [employer]   Alendronate (By mouth)   Alendronate (a-VXF-yvmo-nicole)  Treats or prevents osteoporosis  Also treats Paget disease of the bone  Brand Name(s): Binosto, Fosamax   There may be other brand names for this medicine  When This Medicine Should Not Be Used: This medicine is not right for everyone  Do not use it if you had an allergic reaction to alendronate, or if you have esophagus problems or trouble swallowing  Do not use it if you cannot stand or sit upright for at least 30 minutes after you take the medicine  How to Use This Medicine:   Liquid, Tablet, Fizzy Tablet  · Take this medicine in the morning on an empty stomach  Follow the directions exactly to lower the risk of esophagus problems  · Sit or stand while you take this medicine  Do not lie down for at least 30 minutes after you take the medicine, and do not lie down until after you have eaten  · Use plain water to take your medicine   The medicine may not work as well if you use other liquids  ? Tablet: Swallow whole with 6 to 8 ounces of water  Do not chew or suck on the tablet  ? Oral liquid: Measure the oral liquid medicine with a marked measuring spoon, oral syringe, or medicine cup  Drink at least 2 ounces (1/4 cup) of water after you take the liquid medicine  ? Effervescent tablet: Dissolve the tablet in 4 ounces of room temperature water  Wait at least 5 minutes after the bubbling stops  Then stir the solution for 10 seconds and drink it  · Wait at least 30 minutes after you take this medicine before you eat or drink or take any other medicine  This will help your body absorb the medicine  · This medicine should come with a Medication Guide  Ask your pharmacist for a copy if you do not have one  · Missed dose: Take a dose the next morning  Do not take 2 tablets on the same day  Then go back to your regular schedule  · Store the medicine in a closed container at room temperature, away from heat, moisture, and direct light  Keep the effervescent tablets in the blister pack until you are ready to use them  Drugs and Foods to Avoid:   Ask your doctor or pharmacist before using any other medicine, including over-the-counter medicines, vitamins, and herbal products  · Some medicines can affect how alendronate works  Tell your doctor if you are using any of the following:   ? Cancer medicines  ? NSAID pain or arthritis medicine (including aspirin, celecoxib, diclofenac, ibuprofen, naproxen)  ? Steroid medicine (including as hydrocortisone, methylprednisolone, prednisone, prednisolone, dexamethasone)  · Take alendronate at least 30 minutes before you take any other oral medicine, including aluminum, magnesium, iron, or calcium supplements, or antacids    Warnings While Using This Medicine:   · Tell your doctor if you are pregnant or breastfeeding, or if you have kidney disease, heartburn, anemia, blood clotting problems, ulcers or other stomach or bowel problems, a vitamin D deficiency, or a history of cancer  Tell your doctor if you have dental problems or if you wear dentures  Also tell your doctor if you smoke or drink alcohol  · This medicine may cause the following problems:   ? Damage to your esophagus  ? Increased risk for a thigh bone fracture  ? Low calcium levels  · Tell any doctor or dentist who treats you that you are using this medicine  This medicine may cause jaw problems, especially if you have a tooth pulled or other dental work  · The effervescent tablet contains sodium  Tell your doctor if you are on a low-salt diet  · Your doctor will check your progress and the effects of this medicine at regular visits  Keep all appointments  · Keep all medicine out of the reach of children  Never share your medicine with anyone  Possible Side Effects While Using This Medicine:   Call your doctor right away if you notice any of these side effects:  · Allergic reaction: Itching or hives, swelling in your face or hands, swelling or tingling in your mouth or throat, chest tightness, trouble breathing  · Blistering, peeling, red skin rash  · Chest pain, new or worsening heartburn, or a burning feeling in your throat  · Muscle spasms or twitching, tingling or numbness in your fingers, toes, or around your mouth  · Pain or difficulty when swallowing  · Pain, swelling, numbness, or a heavy feeling in your mouth or jaw, loose teeth, or other tooth problems  · Severe bone, joint, or muscle pain  · Unusual pain in your thigh, groin, or hip  If you notice these less serious side effects, talk with your doctor:   · Mild stomach pain or upset  If you notice other side effects that you think are caused by this medicine, tell your doctor  Call your doctor for medical advice about side effects   You may report side effects to FDA at 1-415-FDA-6327  © Copyright [address] [place] [address] Drive Information is for End User's use only and may not be sold, redistributed or otherwise used for commercial purposes  The above information is an  only  It is not intended as medical advice for individual conditions or treatments  Talk to your doctor, nurse or pharmacist before following any medical regimen to see if it is safe and effective for you

## 2021-02-23 NOTE — PROGRESS NOTES
Assessment/Plan:    1  Essential hypertension  - stable on Lisinopril 40 mg daily, will check BMP, continue home BP monitoring and bring BP log to the next visit  - lisinopril (ZESTRIL) 40 mg tablet; Take 1 tablet (40 mg total) by mouth daily  Dispense: 90 tablet; Refill: 3  - Basic metabolic panel; Future    2  Osteoporosis   - discussed treatment options, advised to start Fosamax, continue daily MVI and vitamin D supplements, discussed weight bearing and strength training exercises, will repeat DEXA scan in 02/2023  - alendronate (Fosamax) 70 mg tablet; Take 1 tablet (70 mg total) by mouth every 7 days take 30 min before breakfast with 8 oz of water, stay upright for 1 hour  Dispense: 4 tablet; Refill: 1    3  Hyponatremia  - recent sodium 135, will recheck BMP  - Basic metabolic panel; Future    4  Right shoulder pain/ s/p reverse total shoulder arthroplasty  - doing well, continue home exercise program  - follow up with Ortho    5  Thyroid nodule  - will repeat TFT's in 10/2021    Return in 1 month for pre-op visit for cataract surgery or sooner as needed  Possible side effects of new medications were reviewed with the patient today  The patient understands and agrees with the treatment plan  Subjective:   Chief Complaint   Patient presents with    Hypertension    Medicare Wellness Visit      Patient ID: Lencho Baker is a 68 y o  female who presents for follow up hypertension and review her recent lab and study results  Patient recently underwent right breast stereotactic biopsy on 02/15/2021 which was negative for malignancy, she was recommended to return for annual screening mammogram       She was also seen by her Endocrine on 10/27/2020 for follow up for thyroid nodules, her thyroid US showed no changes and her TSH was normal at 1 75, she was advised to further US follow up needed, just yearly TFT's       Patient underwent right shoulder replacement in 08/2020, she is doing much better, completed Physical therapy and now on home exercise program       Patient sustained left distal radius fracture after a fall on 10/31/20, she underwent OT and doing home exercises, but still having burning in her 3rd and 4th fingers which is gradually improving  Patient has no other complaints, she is taking her Lisinopril as prescribed and reports good home BP readings in 120's- 130's/ 70's        The following portions of the patient's history were reviewed and updated as appropriate: allergies, current medications, past family history, past medical history, past social history, past surgical history and problem list     Past Medical History:   Diagnosis Date    Arthritis     Disease of thyroid gland     thyroid nodules    Dry eye     Hard of hearing     Hematuria     Hypertension     Leaking of urine     Osteoarthritis     Peritonitis ([address] )     in age 44's due to ruptured apendix    Shoulder joint pain     right- reverse arthroplasty today 8/25/2020    Wears glasses     Wears partial dentures     upper     Past Surgical History:   Procedure Laterality Date    APPENDECTOMY      BREAST BIOPSY Right 02/15/2021    stereo    CHOLECYSTECTOMY LAPAROSCOPIC      COLONOSCOPY      HIP SURGERY      JOINT REPLACEMENT Right     hip    LAPAROSCOPIC CHOLECYSTECTOMY      LAPAROSCOPY      MAMMO STEREOTACTIC BREAST BIOPSY RIGHT (ALL INC) Right 2/15/2021    CT RECONSTR TOTAL SHOULDER IMPLANT Right 8/25/2020    Procedure: ARTHROPLASTY SHOULDER REVERSE;  Surgeon: Daphney Cartwright MD;  Location: [facility];  Service: Orthopedics    TONSILLECTOMY      TUBAL LIGATION      WISDOM TOOTH EXTRACTION       Family History   Problem Relation Age of Onset    Hypertension Mother     Coronary artery disease Father     Hypertension Father     Hyperlipidemia Father     No Known Problems Brother     No Known Problems Daughter     No Known Problems Son     No Known Problems Maternal Grandmother     No Known Problems Maternal Grandfather     No Known Problems Paternal Grandmother     No Known Problems Paternal Grandfather     Alcohol abuse Neg Hx     Substance Abuse Neg Hx     Mental illness Neg Hx      Social History     Socioeconomic History    Marital status: /Civil Union     Spouse name: Not on file    Number of children: Not on file    Years of education: Not on file    Highest education level: Not on file   Occupational History    Not on file   Social Needs    Financial resource strain: Not on file    Food insecurity     Worry: Not on file     Inability: Not on file   [employer] needs     Medical: Not on file     Non-medical: Not on file   Tobacco Use    Smoking status: Former Smoker     Packs/day: 0 25     Years: 10 00     Pack years: 2 50     Quit date:      Years since quittin 1    Smokeless tobacco: Never Used   Substance and Sexual Activity    Alcohol use:  Yes     Alcohol/week: 1 0 standard drinks     Types: 1 Glasses of wine per week     Frequency: 2-3 times a week     Drinks per session: 1 or 2     Binge frequency: Never    Drug use: Never    Sexual activity: Not Currently     Partners: Male     Birth control/protection: None   Lifestyle    Physical activity     Days per week: Not on file     Minutes per session: Not on file    Stress: Not on file   Relationships    Social connections     Talks on phone: Not on file     Gets together: Not on file     Attends [employer] service: Not on file     Active member of club or organization: Not on file     Attends meetings of clubs or organizations: Not on file     Relationship status: Not on file    Intimate partner violence     Fear of current or ex partner: Not on file     Emotionally abused: Not on file     Physically abused: Not on file     Forced sexual activity: Not on file   Other Topics Concern    Not on file   Social History Narrative    Not on file       Current Outpatient Medications:     Cholecalciferol (VITAMIN D) 50 MCG (2000 UT) tablet, Take 2,000 Units by mouth daily, Disp: , Rfl:     doxycycline hyclate (VIBRAMYCIN) 50 mg capsule, TAKE 1 CAPSULE (50 MG TOTAL) BY MOUTH ONCE A WEEK , Disp: , Rfl:     estradiol (ESTRACE) 0 1 mg/g vaginal cream, Insert 0 5 g into the vagina daily for 14 days, THEN 1 g 2 (two) times a week , Disp: 42 5 g, Rfl: 4    lisinopril (ZESTRIL) 40 mg tablet, Take 1 tablet (40 mg total) by mouth daily, Disp: 90 tablet, Rfl: 3    Multiple Vitamins-Minerals (MULTIVITAMIN WOMEN PO), Take 1 tablet by mouth daily , Disp: , Rfl:     alendronate (Fosamax) 70 mg tablet, Take 1 tablet (70 mg total) by mouth every 7 days take 30 min before breakfast with 8 oz of water, stay upright for 1 hour, Disp: 4 tablet, Rfl: 1    Review of Systems   Constitutional: Negative for appetite change, chills, fatigue, fever and unexpected weight change  Respiratory: Negative for cough, shortness of breath and wheezing  Cardiovascular: Negative for chest pain, palpitations and leg swelling  Gastrointestinal: Negative for abdominal pain, blood in stool, constipation, diarrhea, nausea and vomiting  Genitourinary: Negative for difficulty urinating, dysuria, flank pain, frequency, hematuria, pelvic pain, urgency and vaginal bleeding  Musculoskeletal: Negative for myalgias  Neurological: Negative for dizziness, syncope, weakness, numbness and headaches  Hematological: Negative for adenopathy  Psychiatric/Behavioral: Negative for dysphoric mood and sleep disturbance  The patient is not nervous/anxious            Objective:    Vitals:    02/23/21 0931   BP: 140/80   Pulse: 80   Resp: 16   Temp: (!) 97 4 °F (36 3 °C)   Weight: 53 9 kg (118 lb 12 8 oz)   Height: 5' 3 25" (1 607 m)     Wt Readings from Last 3 Encounters:   02/23/21 53 9 kg (118 lb 12 8 oz)   02/15/21 53 5 kg (118 lb)   02/03/21 53 5 kg (118 lb)     BP Readings from Last 3 Encounters:   02/23/21 140/80   02/15/21 148/83   01/26/21 132/78        Physical Exam  Constitutional:       General: She is not in acute distress  Appearance: She is well-developed  Neck:      Musculoskeletal: Neck supple  Thyroid: No thyromegaly  Cardiovascular:      Rate and Rhythm: Normal rate and regular rhythm  Heart sounds: Normal heart sounds  No murmur  Pulmonary:      Effort: Pulmonary effort is normal  No respiratory distress  Breath sounds: No wheezing, rhonchi or rales  Abdominal:      General: There is no distension  Palpations: Abdomen is soft  There is no mass  Tenderness: There is no abdominal tenderness  Lymphadenopathy:      Cervical: No cervical adenopathy  Neurological:      Mental Status: She is alert and oriented to person, place, and time  Psychiatric:         Behavior: Behavior normal          Thought Content:  Thought content normal           Lab results 10/05/2020    CHOLESTEROL                   174   TRIGLYCERIDE                       59   HIGH DENS LIPOPROT          78   VERY LOW DENS LIPO          12   LOW DENS LIPOPROT           84   CHOL/HDL RATIO                    2 2     BLOOD UREA NITROGEN      16   CREATININE                             0 62   SODIUM                                    135   POTASSIUM                              4 4

## 2021-02-23 NOTE — PROGRESS NOTES
Assessment and Plan:     1  Medicare annual wellness visit, initial  - patient had her Covid vaccine on 01/27/21 and is scheduled for a booster shot tomorrow  - patient completed her 2 dose Shingrix vaccine on 09/22/20       Preventive health issues were discussed with patient, and age appropriate screening tests were ordered as noted in patient's After Visit Summary  Personalized health advice and appropriate referrals for health education or preventive services given if needed, as noted in patient's After Visit Summary       History of Present Illness:     Patient presents for Medicare Annual Wellness visit    Patient Care Team:  Denise Friend MD as PCP - General (Family Medicine)     Problem List:     Patient Active Problem List   Diagnosis    Wears partial dentures    Hypertension    Dry eye    Disease of thyroid gland    Rotator cuff tear arthropathy, right    Nondisplaced fracture of distal end of left radius      Past Medical and Surgical History:     Past Medical History:   Diagnosis Date    Arthritis     Disease of thyroid gland     thyroid nodules    Dry eye     Hard of hearing     Hematuria     Hypertension     Leaking of urine     Osteoarthritis     Peritonitis ([address] )     in age 44's due to ruptured apendix    Shoulder joint pain     right- reverse arthroplasty today 8/25/2020    Wears glasses     Wears partial dentures     upper     Past Surgical History:   Procedure Laterality Date    APPENDECTOMY      BREAST BIOPSY Right 02/15/2021    stereo    CHOLECYSTECTOMY LAPAROSCOPIC      COLONOSCOPY      HIP SURGERY      JOINT REPLACEMENT Right     hip    LAPAROSCOPIC CHOLECYSTECTOMY      LAPAROSCOPY      MAMMO STEREOTACTIC BREAST BIOPSY RIGHT (ALL INC) Right 2/15/2021    ID RECONSTR TOTAL SHOULDER IMPLANT Right 8/25/2020    Procedure: ARTHROPLASTY SHOULDER REVERSE;  Surgeon: Shilo Barnard MD;  Location: [facility];  Service: Orthopedics    TONSILLECTOMY     [facility]   WISDOM TOOTH EXTRACTION        Family History:     Family History   Problem Relation Age of Onset    Hypertension Mother     Coronary artery disease Father     Hypertension Father     Hyperlipidemia Father     No Known Problems Brother     No Known Problems Daughter     No Known Problems Son     No Known Problems Maternal Grandmother     No Known Problems Maternal Grandfather     No Known Problems Paternal Grandmother     No Known Problems Paternal Grandfather     Alcohol abuse Neg Hx     Substance Abuse Neg Hx     Mental illness Neg Hx       Social History:     E-Cigarette/Vaping    E-Cigarette Use Never User      E-Cigarette/Vaping Substances    Nicotine No     THC No     CBD No     Flavoring No     Other No     Unknown No      Social History     Socioeconomic History    Marital status: /Civil Union     Spouse name: None    Number of children: None    Years of education: None    Highest education level: None   Occupational History    None   Social Needs    Financial resource strain: None    Food insecurity     Worry: None     Inability: None    Transportation needs     Medical: None     Non-medical: None   Tobacco Use    Smoking status: Former Smoker     Packs/day: 0 25     Years: 10 00     Pack years: 2 50     Quit date:      Years since quittin 1    Smokeless tobacco: Never Used   Substance and Sexual Activity    Alcohol use:  Yes     Alcohol/week: 1 0 standard drinks     Types: 1 Glasses of wine per week     Frequency: 2-3 times a week     Drinks per session: 1 or 2     Binge frequency: Never    Drug use: Never    Sexual activity: Not Currently     Partners: Male     Birth control/protection: None   Lifestyle    Physical activity     Days per week: None     Minutes per session: None    Stress: None   Relationships    Social connections     Talks on phone: None     Gets together: None     Attends Confucianist service: None     Active member of club or organization: None     Attends meetings of clubs or organizations: None     Relationship status: None    Intimate partner violence     Fear of current or ex partner: None     Emotionally abused: None     Physically abused: None     Forced sexual activity: None   Other Topics Concern    None   Social History Narrative    None      Medications and Allergies:     Current Outpatient Medications   Medication Sig Dispense Refill    Cholecalciferol (VITAMIN D) 50 MCG (2000 UT) tablet Take 2,000 Units by mouth daily      doxycycline hyclate (VIBRAMYCIN) 50 mg capsule TAKE 1 CAPSULE (50 MG TOTAL) BY MOUTH ONCE A WEEK   estradiol (ESTRACE) 0 1 mg/g vaginal cream Insert 0 5 g into the vagina daily for 14 days, THEN 1 g 2 (two) times a week  42 5 g 4    lisinopril (ZESTRIL) 40 mg tablet Take 1 tablet (40 mg total) by mouth daily 90 tablet 1    Multiple Vitamins-Minerals (MULTIVITAMIN WOMEN PO) Take 1 tablet by mouth daily        No current facility-administered medications for this visit  No Known Allergies   Immunizations:     Immunization History   Administered Date(s) Administered    INFLUENZA 09/24/2014, 10/02/2017    Influenza Split High Dose Preservative Free IM 10/11/2018, 09/30/2019    Zoster Vaccine Recombinant 07/28/2020      Health Maintenance:         Topic Date Due    Cervical Cancer Screening  01/26/2023    DXA SCAN  02/03/2023         Topic Date Due    DTaP,Tdap,and Td Vaccines (1 - Tdap) 04/16/1961    Pneumococcal Vaccine: 65+ Years (1 of 1 - PPSV23) 04/16/2007    Influenza Vaccine (1) 09/01/2020      Medicare Health Risk Assessment:     /80   Pulse 80   Temp (!) 97 4 °F (36 3 °C)   Resp 16   Ht 5' 3 25" (1 607 m)   Wt 53 9 kg (118 lb 12 8 oz)   BMI 20 88 kg/m²      Manuel Amato is here for her Initial Wellness visit  Health Risk Assessment:   Patient rates overall health as fair  Patient feels that their physical health rating is same  Eyesight was rated as slightly worse  Hearing was rated as slightly worse  Patient feels that their emotional and mental health rating is same  Pain experienced in the last 7 days has been some  Patient's pain rating has been 3/10  Patient states that she has experienced no weight loss or gain in last 6 months  Depression Screening:   PHQ-2 Score: 0      Fall Risk Screening: In the past year, patient has experienced: history of falling in past year    Number of falls: 1  Injured during fall?: Yes    Feels unsteady when standing or walking?: No    Worried about falling?: No      Urinary Incontinence Screening:   Patient has leaked urine accidently in the last six months  Home Safety:  Patient does not have trouble with stairs inside or outside of their home  Patient has working smoke alarms and has working carbon monoxide detector  Home safety hazards include: none  No stairs  Nutrition:   Current diet is Regular and Limited junk food  Medications:   Patient is currently taking over-the-counter supplements  OTC medications include: see medication list  Patient is able to manage medications  Activities of Daily Living (ADLs)/Instrumental Activities of Daily Living (IADLs):   Walk and transfer into and out of bed and chair?: Yes  Dress and groom yourself?: Yes    Bathe or shower yourself?: Yes    Feed yourself?  Yes  Do your laundry/housekeeping?: Yes  Manage your money, pay your bills and track your expenses?: Yes  Make your own meals?: Yes    Do your own shopping?: Yes    Previous Hospitalizations:   Any hospitalizations or ED visits within the last 12 months?: Yes    How many hospitalizations have you had in the last year?: 1-2    Advance Care Planning:   Living will: Yes    Advanced directive: Yes    End of Life Decisions reviewed with patient: Yes    Provider agrees with end of life decisions: Yes      Cognitive Screening:   Provider or family/friend/caregiver concerned regarding cognition?: No    PREVENTIVE SCREENINGS Cardiovascular Screening:    General: Screening Current      Diabetes Screening:     General: Screening Current      Breast Cancer Screening:     General: Screening Current      Cervical Cancer Screening:    General: Screening Not Indicated      Osteoporosis Screening:    General: Screening Current      Abdominal Aortic Aneurysm (AAA) Screening:        General: Screening Not Indicated      Lung Cancer Screening:     General: Screening Not Indicated      Vernon Mckeon MD

## 2021-03-02 ENCOUNTER — TELEPHONE (OUTPATIENT)
Dept: FAMILY MEDICINE CLINIC | Facility: CLINIC | Age: 81
End: 2021-03-02

## 2021-03-02 NOTE — TELEPHONE ENCOUNTER
You prescribed Fosamax for patient's osteoporosis  She picked up the prescription and read the side effects of the drug, she is hesitate to start taking it  She states that the effect of "jaw pain" and "Unusual bone fractures" scares her  She would like to speak to you before starting this med  She does have an appt with you for a pre-op clearance on 3/29/21  Can you either prescribe something with less side effects or could she wait to start this one until she can discuss the side effects with you on the 3/29? Or should I have her come in for a short OV to discuss another drug or therapy?

## 2021-03-18 DIAGNOSIS — M81.0 AGE-RELATED OSTEOPOROSIS WITHOUT CURRENT PATHOLOGICAL FRACTURE: ICD-10-CM

## 2021-03-18 RX ORDER — ALENDRONATE SODIUM 70 MG/1
TABLET ORAL
Qty: 4 TABLET | Refills: 6 | Status: SHIPPED | OUTPATIENT
Start: 2021-03-18 | End: 2021-10-04 | Stop reason: ALTCHOICE

## 2021-03-23 ENCOUNTER — LAB (OUTPATIENT)
Dept: LAB | Facility: HOSPITAL | Age: 81
End: 2021-03-23
Payer: COMMERCIAL

## 2021-03-23 ENCOUNTER — RA CDI HCC (OUTPATIENT)
Dept: OTHER | Facility: HOSPITAL | Age: 81
End: 2021-03-23

## 2021-03-23 DIAGNOSIS — I10 ESSENTIAL HYPERTENSION: ICD-10-CM

## 2021-03-23 DIAGNOSIS — E87.1 HYPONATREMIA: ICD-10-CM

## 2021-03-23 LAB
ANION GAP SERPL CALCULATED.3IONS-SCNC: 6 MMOL/L (ref 4–13)
BUN SERPL-MCNC: 16 MG/DL (ref 5–25)
CALCIUM SERPL-MCNC: 9.1 MG/DL (ref 8.3–10.1)
CHLORIDE SERPL-SCNC: 100 MMOL/L (ref 100–108)
CO2 SERPL-SCNC: 27 MMOL/L (ref 21–32)
CREAT SERPL-MCNC: 0.52 MG/DL (ref 0.6–1.3)
GFR SERPL CREATININE-BSD FRML MDRD: 92 ML/MIN/1.73SQ M
GLUCOSE P FAST SERPL-MCNC: 95 MG/DL (ref 65–99)
POTASSIUM SERPL-SCNC: 4.3 MMOL/L (ref 3.5–5.3)
SODIUM SERPL-SCNC: 133 MMOL/L (ref 136–145)

## 2021-03-23 PROCEDURE — 36415 COLL VENOUS BLD VENIPUNCTURE: CPT

## 2021-03-23 PROCEDURE — 80048 BASIC METABOLIC PNL TOTAL CA: CPT

## 2021-03-29 ENCOUNTER — OFFICE VISIT (OUTPATIENT)
Dept: FAMILY MEDICINE CLINIC | Facility: CLINIC | Age: 81
End: 2021-03-29
Payer: COMMERCIAL

## 2021-03-29 VITALS
RESPIRATION RATE: 16 BRPM | HEART RATE: 82 BPM | SYSTOLIC BLOOD PRESSURE: 136 MMHG | BODY MASS INDEX: 21.16 KG/M2 | DIASTOLIC BLOOD PRESSURE: 78 MMHG | HEIGHT: 63 IN | WEIGHT: 119.4 LBS

## 2021-03-29 DIAGNOSIS — I10 ESSENTIAL HYPERTENSION: ICD-10-CM

## 2021-03-29 DIAGNOSIS — E87.1 HYPONATREMIA: ICD-10-CM

## 2021-03-29 DIAGNOSIS — Z01.818 VISIT FOR PRE-OPERATIVE EXAMINATION: Primary | ICD-10-CM

## 2021-03-29 DIAGNOSIS — M81.0 AGE-RELATED OSTEOPOROSIS WITHOUT CURRENT PATHOLOGICAL FRACTURE: ICD-10-CM

## 2021-03-29 DIAGNOSIS — E04.1 THYROID NODULE: ICD-10-CM

## 2021-03-29 DIAGNOSIS — H25.9 AGE-RELATED CATARACT OF BOTH EYES, UNSPECIFIED AGE-RELATED CATARACT TYPE: ICD-10-CM

## 2021-03-29 PROCEDURE — 1160F RVW MEDS BY RX/DR IN RCRD: CPT | Performed by: FAMILY MEDICINE

## 2021-03-29 PROCEDURE — 99214 OFFICE O/P EST MOD 30 MIN: CPT | Performed by: FAMILY MEDICINE

## 2021-03-29 PROCEDURE — 3078F DIAST BP <80 MM HG: CPT | Performed by: FAMILY MEDICINE

## 2021-03-29 PROCEDURE — 1036F TOBACCO NON-USER: CPT | Performed by: FAMILY MEDICINE

## 2021-03-29 PROCEDURE — 3075F SYST BP GE 130 - 139MM HG: CPT | Performed by: FAMILY MEDICINE

## 2021-03-29 NOTE — PROGRESS NOTES
FAMILY PRACTICE PRE-OPERATIVE EVALUATION  [facility] PHYSICIAN GROUP [facility]      NAME: Andie Ramos  AGE: 68 y o  SEX: female  : 1940     DATE: 3/30/2021    Family Practice Pre-Operative Evaluation      Chief Complaint: Pre-operative Evaluation     Surgery: cataract surgery   Anticipated Date of Surgery: 2021  Referring Provider: Dr Estrellita Espinosa     History of Present Illness: Andie Ramos is a 68 y o  female who presents to the office today for a preoperative consultation at the request of surgeon, Dr Fuad Geller , who plans on performing cataract surgery OD on 21 and OS on 21  Planned anesthesia is MAC  Patient has a bleeding risk of: no recent abnormal bleeding  Current anti-platelet/anti-coagulation medications that the patient is prescribed includes: none     Assessment of Chronic Conditions:     1  Essential hypertension  - stable on Lisinopril 40 mg daily    2  Hyponatremia- sodium 133, stable, will recheck lab work prior to next visit    3  Osteoporosis - patient never started Fosamax after reading about side effects, discussed other treatment options with Reclast or Prolia, continue calcium/ vitamn D supplements, weight bearing exercise    4   Thyroid nodule - will refer to Endocrine    Assessment of Cardiac Risk:  · Denies unstable or severe angina or MI in the last 6 weeks or history of stent placement in the last year   · Denies decompensated heart failure (e g  New onset heart failure, NYHA functional class IV heart failure, or worsening existing heart failure)  · Denies significant arrhythmias such as high grade AV block, symptomatic ventricular arrhythmia, newly recognized ventricular tachycardia, supraventricular tachycardia with resting heart rate >100, or symptomatic bradycardia  · Denies severe heart valve disease including aortic stenosis or symptomatic mitral stenosis     Exercise Capacity:  · Able to walk 4 blocks without symptoms?: Yes  · Able to walk 2 flights without symptoms?: Yes    Prior Anesthesia Reactions: No     Personal history of venous thromboembolic disease? No    History of steroid use for >2 weeks within last year? No         Review of Systems:     Review of Systems   Constitutional: Negative for appetite change, chills, fatigue, fever and unexpected weight change  HENT: Negative for congestion, ear pain and sore throat  Eyes: Negative for pain, discharge, redness and itching  Decreased visual acuity   Respiratory: Negative for cough, shortness of breath and wheezing  Cardiovascular: Negative for chest pain, palpitations and leg swelling  Gastrointestinal: Negative for abdominal pain, blood in stool, constipation, diarrhea, nausea and vomiting  Endocrine: Negative for cold intolerance, heat intolerance, polydipsia and polyuria  Genitourinary: Negative for difficulty urinating, dysuria, flank pain, frequency, hematuria, pelvic pain, urgency and vaginal bleeding  Musculoskeletal: Negative for myalgias  Neurological: Negative for dizziness, syncope, weakness, numbness and headaches  Hematological: Negative for adenopathy  Psychiatric/Behavioral: Negative for dysphoric mood and sleep disturbance  The patient is not nervous/anxious          Current Problem List:     Patient Active Problem List   Diagnosis    Wears partial dentures    Hypertension    Dry eye    Disease of thyroid gland    Rotator cuff tear arthropathy, right    Nondisplaced fracture of distal end of left radius       Allergies:     No Known Allergies    Current Medications:       Current Outpatient Medications:     Calcium Carbonate-Vit D-Min (CALCIUM 1200 PO), Take by mouth daily, Disp: , Rfl:     Cholecalciferol (VITAMIN D) 50 MCG (2000 UT) tablet, Take 2,000 Units by mouth daily, Disp: , Rfl:     doxycycline hyclate (VIBRAMYCIN) 50 mg capsule, TAKE 1 CAPSULE (50 MG TOTAL) BY MOUTH ONCE A WEEK , Disp: , Rfl:   estradiol (ESTRACE) 0 1 mg/g vaginal cream, Insert 0 5 g into the vagina daily for 14 days, THEN 1 g 2 (two) times a week , Disp: 42 5 g, Rfl: 4    lisinopril (ZESTRIL) 40 mg tablet, Take 1 tablet (40 mg total) by mouth daily, Disp: 90 tablet, Rfl: 3    Multiple Vitamins-Minerals (MULTIVITAMIN WOMEN PO), Take 1 tablet by mouth daily , Disp: , Rfl:     alendronate (FOSAMAX) 70 mg tablet, TAKE 1 TABLET BY MOUTH EVERY 7 DAYS TAKE 30 MIN BEFORE BREAKFAST WITH 8 OZ OF WATER, STAY UPRIGHT FOR 1 HOUR (Patient not taking: Reported on 3/29/2021), Disp: 4 tablet, Rfl: 6    Past Medical History:       Past Medical History:   Diagnosis Date    Arthritis     Disease of thyroid gland     thyroid nodules    Dry eye     Hard of hearing     Hematuria     Hypertension     Leaking of urine     Osteoarthritis     Peritonitis ([address] )     in age 44's due to ruptured apendix    Shoulder joint pain     right- reverse arthroplasty today 8/25/2020    Wears glasses     Wears partial dentures     upper        Past Surgical History:   Procedure Laterality Date    APPENDECTOMY      BREAST BIOPSY Right 02/15/2021    stereo    CHOLECYSTECTOMY LAPAROSCOPIC      COLONOSCOPY      HIP SURGERY      JOINT REPLACEMENT Right     hip    LAPAROSCOPIC CHOLECYSTECTOMY      LAPAROSCOPY      MAMMO STEREOTACTIC BREAST BIOPSY RIGHT (ALL INC) Right 2/15/2021    NH RECONSTR TOTAL SHOULDER IMPLANT Right 8/25/2020    Procedure: ARTHROPLASTY SHOULDER REVERSE;  Surgeon: Daphney Cartwright MD;  [facility];  Service: Orthopedics    TONSILLECTOMY      TUBAL LIGATION      WISDOM TOOTH EXTRACTION          Family History   Problem Relation Age of Onset    Hypertension Mother     Coronary artery disease Father     Hypertension Father     Hyperlipidemia Father     No Known Problems Brother     No Known Problems Daughter     No Known Problems Son     No Known Problems Maternal Grandmother     No Known Problems Maternal Grandfather  No Known Problems Paternal Grandmother     No Known Problems Paternal Grandfather     Alcohol abuse Neg Hx     Substance Abuse Neg Hx     Mental illness Neg Hx         Social History     Socioeconomic History    Marital status: /Civil Union     Spouse name: Not on file    Number of children: Not on file    Years of education: Not on file    Highest education level: Not on file   Occupational History    Not on file   Social Needs    Financial resource strain: Not on file    Food insecurity     Worry: Not on file     Inability: Not on file   [employer] needs     Medical: Not on file     Non-medical: Not on file   Tobacco Use    Smoking status: Former Smoker     Packs/day: 0 25     Years: 10 00     Pack years: 2 50     Quit date:      Years since quittin 2    Smokeless tobacco: Never Used   Substance and Sexual Activity    Alcohol use:  Yes     Alcohol/week: 1 0 standard drinks     Types: 1 Glasses of wine per week     Frequency: 2-3 times a week     Drinks per session: 1 or 2     Binge frequency: Never    Drug use: Never    Sexual activity: Not Currently     Partners: Male     Birth control/protection: None   Lifestyle    Physical activity     Days per week: Not on file     Minutes per session: Not on file    Stress: Not on file   Relationships    Social connections     Talks on phone: Not on file     Gets together: Not on file     Attends Zoroastrianism service: Not on file     Active member of club or organization: Not on file     Attends meetings of clubs or organizations: Not on file     Relationship status: Not on file    Intimate partner violence     Fear of current or ex partner: Not on file     Emotionally abused: Not on file     Physically abused: Not on file     Forced sexual activity: Not on file   Other Topics Concern    Not on file   Social History Narrative    Not on file        Physical Exam:     /78 (BP Location: Left arm, Patient Position: Sitting, Cuff Size: Standard)   Pulse 82   Resp 16   Ht 5' 3 25" (1 607 m)   Wt 54 2 kg (119 lb 6 4 oz)   BMI 20 98 kg/m²     Physical Exam  Constitutional:       General: She is not in acute distress  Appearance: Normal appearance  She is well-developed  HENT:      Right Ear: Tympanic membrane and ear canal normal       Left Ear: Tympanic membrane and ear canal normal       Mouth/Throat:      Mouth: Mucous membranes are moist       Pharynx: Oropharynx is clear  Eyes:      General: No scleral icterus  Extraocular Movements: Extraocular movements intact  Conjunctiva/sclera: Conjunctivae normal       Pupils: Pupils are equal, round, and reactive to light  Neck:      Musculoskeletal: Neck supple  Thyroid: No thyromegaly  Cardiovascular:      Rate and Rhythm: Normal rate and regular rhythm  Heart sounds: Normal heart sounds  No murmur  Pulmonary:      Effort: Pulmonary effort is normal  No respiratory distress  Breath sounds: No wheezing, rhonchi or rales  Abdominal:      General: There is no distension  Palpations: Abdomen is soft  There is no mass  Tenderness: There is no abdominal tenderness  Musculoskeletal:      Right lower leg: No edema  Left lower leg: No edema  Lymphadenopathy:      Cervical: No cervical adenopathy  Neurological:      General: No focal deficit present  Mental Status: She is alert and oriented to person, place, and time  Cranial Nerves: No cranial nerve deficit  Psychiatric:         Mood and Affect: Mood normal          Behavior: Behavior normal          Thought Content: Thought content normal           Data:     Pre-operative work-up    Laboratory Results: I have personally reviewed pertinent reports  EKG: not indicated    Chest x-ray: not indicated      Assessment & Recommendations:     1  Visit for pre-operative examination  2  Age-related cataract of both eyes  3  Essential hypertension  4  Age-related osteoporosis   5  Hyponatremia  6  Thyroid nodule      Pre-Op Evaluation Assessment  68 y o  female with planned surgery: cataract surgery   Known risk factors for perioperative complications: None  Pre-Op Evaluation Plan  1  Further preoperative workup as follows:   - None; no further preoperative work-up is required    2  Medication Management/Recommendations:   - Patient should continue antihypertensive medications up through and including the day of surgery  3  Prophylaxis for cardiac events with perioperative beta-blockers: not indicated  4  Patient requires further consultation with: None    Clearance  Patient is CLEARED for surgery without any additional cardiac testing  Ame Ward MD  [address] [address]  Phone#  [phone]  Fax#  [phone]    Falls Plan of Care: Balance, strength, and gait training instructions were provided

## 2021-03-29 NOTE — PATIENT INSTRUCTIONS
Fall Prevention   AMBULATORY CARE:   Fall prevention  includes ways to make your home and other areas safer  It also includes ways you can move more carefully to prevent a fall  Health conditions that cause changes in your blood pressure, vision, or muscle strength and coordination may increase your risk for falls  Medicines may also increase your risk for falls if they make you dizzy, weak, or sleepy  Call 911 or have someone else call if:   · You have fallen and are unconscious  · You have fallen and cannot move part of your body  Contact your healthcare provider if:   · You have fallen and have pain or a headache  · You have questions or concerns about your condition or care  Fall prevention tips:   · Stand or sit up slowly  This may help you keep your balance and prevent falls  · Use assistive devices as directed  Your healthcare provider may suggest that you use a cane or walker to help you keep your balance  You may need to have grab bars put in your bathroom near the toilet or in the shower  · Wear shoes that fit well and have soles that   Wear shoes both inside and outside  Use slippers with good   Do not wear shoes with high heels  · Wear a personal alarm  This is a device that allows you to call 911 if you fall and need help  Ask your healthcare provider for more information  · Stay active  Exercise can help strengthen your muscles and improve your balance  Your healthcare provider may recommend water aerobics or walking  He or she may also recommend physical therapy to improve your coordination  Never start an exercise program without talking to your healthcare provider first          · Manage your medical conditions  Keep all appointments with your healthcare providers  Visit your eye doctor as directed  Home safety tips:       · Add items to prevent falls in the bathroom  Put nonslip strips on your bath or shower floor to prevent you from slipping   Use a bath mat if you do not have carpet in the bathroom  This will prevent you from falling when you step out of the bath or shower  Use a shower seat so you do not need to stand while you shower  Sit on the toilet or a chair in your bathroom to dry yourself and put on clothing  This will prevent you from losing your balance from drying or dressing yourself while you are standing  · Keep paths clear  Remove books, shoes, and other objects from walkways and stairs  Place cords for telephones and lamps out of the way so that you do not need to walk over them  Tape them down if you cannot move them  Remove small rugs  If you cannot remove a rug, secure it with double-sided tape  This will prevent you from tripping  · Install bright lights in your home  Use night lights to help light paths to the bathroom or kitchen  Always turn on the light before you start walking  · Keep items you use often on shelves within reach  Do not use a step stool to help you reach an item  · Paint or place reflective tape on the edges of your stairs  This will help you see the stairs better  Follow up with your healthcare provider as directed:  Write down your questions so you remember to ask them during your visits  © Copyright [address] Information is for End User's use only and may not be sold, redistributed or otherwise used for commercial purposes  All illustrations and images included in CareNotes® are the copyrighted property of [employer]  or [facility]   The above information is an  only  It is not intended as medical advice for individual conditions or treatments  Talk to your doctor, nurse or pharmacist before following any medical regimen to see if it is safe and effective for you

## 2021-05-25 ENCOUNTER — CONSULT (OUTPATIENT)
Dept: ENDOCRINOLOGY | Facility: CLINIC | Age: 81
End: 2021-05-25
Payer: COMMERCIAL

## 2021-05-25 VITALS
SYSTOLIC BLOOD PRESSURE: 134 MMHG | HEIGHT: 64 IN | HEART RATE: 60 BPM | BODY MASS INDEX: 21.13 KG/M2 | WEIGHT: 123.8 LBS | DIASTOLIC BLOOD PRESSURE: 80 MMHG

## 2021-05-25 DIAGNOSIS — M81.0 AGE-RELATED OSTEOPOROSIS WITHOUT CURRENT PATHOLOGICAL FRACTURE: ICD-10-CM

## 2021-05-25 DIAGNOSIS — E04.1 THYROID NODULE: ICD-10-CM

## 2021-05-25 DIAGNOSIS — Z87.81 HISTORY OF VERTEBRAL COMPRESSION FRACTURE: Primary | ICD-10-CM

## 2021-05-25 PROCEDURE — 1036F TOBACCO NON-USER: CPT | Performed by: INTERNAL MEDICINE

## 2021-05-25 PROCEDURE — 3075F SYST BP GE 130 - 139MM HG: CPT | Performed by: INTERNAL MEDICINE

## 2021-05-25 PROCEDURE — 99205 OFFICE O/P NEW HI 60 MIN: CPT | Performed by: INTERNAL MEDICINE

## 2021-05-25 PROCEDURE — 3079F DIAST BP 80-89 MM HG: CPT | Performed by: INTERNAL MEDICINE

## 2021-05-25 NOTE — PATIENT INSTRUCTIONS
Calcium and Osteoporosis   WHAT YOU NEED TO KNOW:   Calcium is important for osteoporosis because calcium helps build bone mass  Osteoporosis is a long-term medical condition that causes your body to break down more bone than it makes  Your bones become weak, brittle, and more likely to fracture  DISCHARGE INSTRUCTIONS:   Your calcium needs:   · Women:      ? 19 to 50 years: 1,000 mg    ? Over 50: 1,200 mg    ? Pregnant or breastfeeding, 19 years to 50 years: 1,000 mg    · Men:      ? 19 to 70: 1,000 mg    ? Over 70: 1,200 mg    Foods that are high in calcium: The following list shows the number of calcium milligrams (mg) per serving  Your dietitian or healthcare provider can help you create a balanced meal plan for your calcium needs  · Dairy:      ? 1 cup of low-fat plain yogurt (415 mg) or low-fat fruit yogurt (245 to 384 mg)    ? 1½ ounces of shredded cheddar cheese (306 mg) or part skim mozzarella cheese (275 mg)    ? 1 cup of skim, 2%, or whole milk (300 mg)    ? 1 cup of cottage cheese made with 2% milk fat (138 mg)    ? ½ cup of frozen yogurt (103 mg)    · Other foods:      ? 1 cup of calcium-fortified orange juice (300 mg)    ? ½ cup of cooked shashi greens (220 mg)    ? 4 canned sardines, with bones (242 mg)    ? ½ cup of tofu (with added calcium) (204 mg)     How to get extra calcium:   · Add powdered milk to puddings, cocoa, custard, or hot cereal     · Sift powdered milk into flour when you make cakes, cookies, or breads  · Use low-fat or fat-free milk instead of water in pancake mix, mashed potatoes, pudding, or hot breakfast cereal     · Add low-fat or fat-free cheese to salad, soup, or pasta  · Add tofu (with added calcium) to vegetable stir-sommer  · Take calcium supplements if you cannot get enough calcium from the foods you eat  Your body can absorb the most calcium from supplements when you take 500 mg or less at one time   Do not take more than 2,500 mg of calcium supplements each day     Follow up with your doctor or dietitian as directed:  Write down your questions so you remember to ask them during your visits  © Copyright [address] Information is for End User's use only and may not be sold, redistributed or otherwise used for commercial purposes  All illustrations and images included in CareNotes® are the copyrighted property of [employer]  or Becky Martell   The above information is an  only  It is not intended as medical advice for individual conditions or treatments  Talk to your doctor, nurse or pharmacist before following any medical regimen to see if it is safe and effective for you  Alendronate (By mouth)   Alendronate (e-AIW-aqql-nicole)  Treats or prevents osteoporosis  Also treats Paget disease of the bone  Brand Name(s): Binosto, Fosamax   There may be other brand names for this medicine  When This Medicine Should Not Be Used: This medicine is not right for everyone  Do not use it if you had an allergic reaction to alendronate, or if you have esophagus problems or trouble swallowing  Do not use it if you cannot stand or sit upright for at least 30 minutes after you take the medicine  How to Use This Medicine:   Liquid, Tablet, Fizzy Tablet  · Take this medicine in the morning on an empty stomach  Follow the directions exactly to lower the risk of esophagus problems  · Sit or stand while you take this medicine  Do not lie down for at least 30 minutes after you take the medicine, and do not lie down until after you have eaten  · Use plain water to take your medicine  The medicine may not work as well if you use other liquids  ? Tablet: Swallow whole with 6 to 8 ounces of water  Do not chew or suck on the tablet  ? Oral liquid: Measure the oral liquid medicine with a marked measuring spoon, oral syringe, or medicine cup  Drink at least 2 ounces (1/4 cup) of water after you take the liquid medicine  ?  Effervescent tablet: Dissolve the tablet in 4 ounces of room temperature water  Wait at least 5 minutes after the bubbling stops  Then stir the solution for 10 seconds and drink it  · Wait at least 30 minutes after you take this medicine before you eat or drink or take any other medicine  This will help your body absorb the medicine  · The effervescent tablet should not be chewed, swallowed whole, or dissolved in the mouth to prevent the risk of mouth or throat irritation  · This medicine should come with a Medication Guide  Ask your pharmacist for a copy if you do not have one  · Missed dose: Take a dose the next morning  Do not take 2 tablets on the same day  Then go back to your regular schedule  · Store the medicine in a closed container at room temperature, away from heat, moisture, and direct light  Keep the effervescent tablets in the blister pack until you are ready to use them  Drugs and Foods to Avoid:   Ask your doctor or pharmacist before using any other medicine, including over-the-counter medicines, vitamins, and herbal products  · Some medicines can affect how alendronate works  Tell your doctor if you are using any of the following:   ? Levothyroxine, ranitidine injection  ? Cancer medicines  ? NSAIDs (including aspirin, celecoxib, diclofenac, ibuprofen, naproxen)  ? Steroid medicine (including as hydrocortisone, methylprednisolone, prednisone, prednisolone, dexamethasone)  · Take alendronate at least 30 minutes before you take any other oral medicine, including aluminum, magnesium, iron, or calcium supplements, or antacids  Warnings While Using This Medicine:   · Tell your doctor if you are pregnant or breastfeeding, or if you have kidney disease, heartburn, anemia, blood clotting problems, ulcers or other stomach or bowel problems, a vitamin D deficiency, or a history of cancer  Tell your doctor if you have dental problems or if you wear dentures  Also tell your doctor if you smoke or drink alcohol    · This medicine may cause the following problems:   ? Damage to your esophagus  ? Increased risk for a thigh bone fracture  ? Low calcium levels  · Tell any doctor or dentist who treats you that you are using this medicine  This medicine may cause jaw problems, especially if you have a tooth pulled or other dental work  · The effervescent tablet contains sodium  Tell your doctor if you are on a low-salt diet  · Your doctor will check your progress and the effects of this medicine at regular visits  Keep all appointments  · Keep all medicine out of the reach of children  Never share your medicine with anyone  Possible Side Effects While Using This Medicine:   Call your doctor right away if you notice any of these side effects:  · Allergic reaction: Itching or hives, swelling in your face or hands, swelling or tingling in your mouth or throat, chest tightness, trouble breathing  · Blistering, peeling, red skin rash  · Chest pain, new or worsening heartburn, or a burning feeling in your throat  · Muscle spasms or twitching, tingling or numbness in your fingers, toes, or around your mouth  · Pain or difficulty when swallowing  · Pain, swelling, numbness, or a heavy feeling in your mouth or jaw, loose teeth, or other tooth problems  · Severe bone, joint, or muscle pain  · Unusual pain in your thigh, groin, or hip  If you notice these less serious side effects, talk with your doctor:   · Mild stomach pain or upset  If you notice other side effects that you think are caused by this medicine, tell your doctor  Call your doctor for medical advice about side effects  You may report side effects to FDA at 1-019-FDA-9364  © Copyright [employer] 2021 Information is for End User's use only and may not be sold, redistributed or otherwise used for commercial purposes  The above information is an  only  It is not intended as medical advice for individual conditions or treatments   Talk to your doctor, nurse or pharmacist before following any medical regimen to see if it is safe and effective for you  Denosumab (By injection)   Denosumab (civ-LDB-ty-mab)  Treats osteoporosis, bone cancer, hypercalcemia, and other bone problems in patients who have cancer  Brand Name(s): Prolia, Xgeva   There may be other brand names for this medicine  When This Medicine Should Not Be Used: This medicine is not right for everyone  You should not receive it if you had an allergic reaction to denosumab, or if you are pregnant  How to Use This Medicine:   Injectable  · A doctor or other health professional will give you this medicine  It is usually given as a shot under the skin of your upper arm, upper thigh, or stomach  · You may receive other medicines (including calcium supplements, Vitamin D) to prevent unwanted effects  · This medicine should come with a Medication Guide  Ask your pharmacist for a copy if you do not have one  · Missed dose: Call your doctor or pharmacist for instructions  Drugs and Foods to Avoid:   Ask your doctor or pharmacist before using any other medicine, including over-the-counter medicines, vitamins, and herbal products  · Do not use Prolia® and Xgeva® together  They contain the same medicine  · Some medicines can affect how denosumab works  Tell your doctor if you are also using medicine that weakens your immune system, including a steroid or cancer medicine  Warnings While Using This Medicine:   · This medicine may cause birth defects if either partner is using it during conception or pregnancy  Tell your doctor right away if you or your partner becomes pregnant  Use an effective form of birth control during treatment with this medicine and for at least 5 months after the last dose  · Tell your doctor if you are breastfeeding, or if you have kidney disease, diabetes, gum disease, allergy to latex, or a history of fractures   Tell your doctor if you have problems with your thyroid, parathyroid, or digestive system  · This medicine may cause the following problems:  ? Increased risk of broken thigh bone  ? Increased risk of infections  ? Serious skin reactions  ? Severe bone, joint, or muscle pain  · This medicine can cause jaw problems  You must have regular dental exams while you are being treated with this medicine  Tell your dentist that you are receiving this medicine  Practice good oral hygiene  · Do not suddenly stop using this medicine without checking first with your doctor  Doing so may increase your risk for more fractures  Talk to your doctor about other medicines that you can take  · Your doctor will do lab tests at regular visits to check on the effects of this medicine  Keep all appointments  Possible Side Effects While Using This Medicine:   Call your doctor right away if you notice any of these side effects:  · Allergic reaction: Itching or hives, swelling in your face or hands, swelling or tingling in your mouth or throat, chest tightness, trouble breathing  · Blistering, peeling, red skin rash  · Chest pain, fast or uneven heartbeat, trouble breathing  · Fever, chills, cough, sore throat, body aches  · Lightheadedness, dizziness, fainting  · Muscle spasms or twitching, numbness or tingling in your fingers, toes, or lips  · Pain or burning during urination, change in how much or how often you urinate  · Pain, swelling, heavy feeling, or numbness in your mouth or jaw, loose teeth or other teeth problems  · Severe bone, joint, or muscle pain  · Unusual pain in your thigh, groin, or hip  If you notice these less serious side effects, talk with your doctor:   · Diarrhea, nausea  · Redness, pain, itching, burning, swelling, or a lump under your skin where the shot was given  · Tiredness or weakness  If you notice other side effects that you think are caused by this medicine, tell your doctor  Call your doctor for medical advice about side effects   You may report side effects to FDA at 1-800-FDA-1088  © Copyright [employer] 2021 Information is for End User's use only and may not be sold, redistributed or otherwise used for commercial purposes  The above information is an  only  It is not intended as medical advice for individual conditions or treatments  Talk to your doctor, nurse or pharmacist before following any medical regimen to see if it is safe and effective for you  Zoledronic Acid (By injection)   Zoledronic Acid (yvd-pu-JPFX-ik AS-id)  Treats high blood calcium levels  Also treats bone damage caused by Paget disease, multiple myeloma, and cancers that spread to the bone  Also treats osteoporosis and reduces the risk of hip fractures in certain patients  Brand Name(s): Reclast, Zoledronic Acid Novaplus   There may be other brand names for this medicine  When This Medicine Should Not Be Used: This medicine is not right for everyone  You should not receive it if you had an allergic reaction to zoledronic acid, or if you are pregnant  How to Use This Medicine:   Injectable  · A nurse or other health provider will give you this medicine  · Your doctor will prescribe your dose and schedule  This medicine is given through a needle placed in a vein  · Your doctor may tell you to drink extra liquids before your treatment to prevent kidney problems  · Your doctor may also give you vitamin D and calcium supplements  Tell your doctor if you are not able to take these medicines  · This medicine should come with a Medication Guide  Ask your pharmacist for a copy if you do not have one  · Missed dose: You must use this medicine on a fixed schedule  Call your doctor or pharmacist if you miss a dose  Drugs and Foods to Avoid:   Ask your doctor or pharmacist before using any other medicine, including over-the-counter medicines, vitamins, and herbal products  · Do not use other medicines that also contain zoledronic acid   Do not use zoledronic acid together with another bisphosphonate medicine  · Some foods and medicines can affect how zoledronic acid works  Tell your doctor if you are using digoxin, antibiotics, diuretics (water pills), an NSAID pain or arthritis medicine (such as aspirin, celecoxib, ibuprofen, naproxen), steroid medicines, or cancer medicines  Warnings While Using This Medicine:   · It is not safe to take this medicine during pregnancy  It could harm an unborn baby  Tell your doctor right away if you become pregnant  · Tell your doctor if you are breastfeeding, or if you have kidney disease, anemia, aspirin-sensitive asthma, bleeding problems, cancer, congestive heart failure, low blood calcium levels, stomach absorption problems, mineral imbalance, dental problems or gum disease  Also tell your doctor if you had surgery on your bowel or parathyroid or thyroid gland  · This medicine may cause the following problems:  ? Jaw or teeth problems  ? Severe bone, joint, or muscle pain  ? Increased risk of thigh bone fracture  ? Low calcium levels in your blood  · You must have regular dental exams while you are being treated with this medicine  Tell your dentist or oral surgeon that you are using this medicine  · Your doctor will do lab tests at regular visits to check on the effects of this medicine  Keep all appointments    Possible Side Effects While Using This Medicine:   Call your doctor right away if you notice any of these side effects:  · Allergic reaction: Itching or hives, swelling in your face or hands, swelling or tingling in your mouth or throat, chest tightness, trouble breathing  · Chest pain, trouble breathing, fast or uneven heartbeat  · Decrease in how much or how often you urinate, blood in the urine, lower back or side pain, burning or painful urination  · Muscle spasm or twitching, or numbness or tingling in your fingers, feet, or around your mouth  · Pain, swelling, or numbness in the mouth or jaw, loose teeth or other teeth problems  · Severe muscle, bone, or joint pain  · Unusual pain in your thigh, groin, or hip  If you notice these less serious side effects, talk with your doctor:   · Fever, chills, cough, sore throat, and body aches  · Headache  · Mild nausea, constipation, diarrhea, stomach pain or upset  · Redness, pain, or swelling of your skin where the needle is placed  If you notice other side effects that you think are caused by this medicine, tell your doctor  Call your doctor for medical advice about side effects  You may report side effects to FDA at 8-028-TTP-5037  © Copyright [employer] 2021 Information is for End User's use only and may not be sold, redistributed or otherwise used for commercial purposes  The above information is an  only  It is not intended as medical advice for individual conditions or treatments  Talk to your doctor, nurse or pharmacist before following any medical regimen to see if it is safe and effective for you  Romosozumab-aqqg (By injection)   Romosozumab-aqqg (rfb-olm-JVP-ue-mab - aqqg)  Treats osteoporosis in postmenopausal women  Brand Name(s): Evenity   There may be other brand names for this medicine  When This Medicine Should Not Be Used: This medicine is not right for everyone  You should not receive it if you had an allergic reaction to romosozumab-aqqg, or if you have low calcium levels in the blood (hypocalcemia)  How to Use This Medicine:   Injectable  · Your doctor will prescribe your exact dose and tell you how often it should be given  This medicine is given as a shot under your skin  · A nurse or other health provider will give you this medicine  · Your doctor may give you calcium and vitamin D supplements while you are receiving this medicine to prevent unwanted effects  · This medicine should come with a Medication Guide  Ask your pharmacist for a copy if you do not have one    Drugs and Foods to Avoid:   Ask your doctor or pharmacist before using any other medicine, including over-the-counter medicines, vitamins, and herbal products  · Some medicines can affect how romosozumab-aqqg works  Tell your doctor if you are using denosumab, bisphosphonate medicine, cancer medicine, or steroids  Warnings While Using This Medicine:   · Tell your doctor if you are pregnant or breastfeeding, or if you have kidney disease, cancer, anemia, blood clotting problems, dental problems, or if you had a heart attack or stroke within the last year  · This medicine may cause the following problems:  ? Increased risk of heart attack or stroke  ? Increased risk for a thigh bone fracture  · Tell any doctor or dentist who treats you that you are using this medicine  This medicine may cause jaw problems, especially if you have a tooth pulled or have other dental work  · Your doctor will do lab tests at regular visits to check on the effects of this medicine  Keep all appointments  Possible Side Effects While Using This Medicine:   Call your doctor right away if you notice any of these side effects:  · Allergic reaction: Itching or hives, swelling in your face or hands, swelling or tingling in your mouth or throat, chest tightness, trouble breathing  · Chest pain that may spread to your arms, jaw, back, or neck, trouble breathing, nausea, unusual sweating, fainting  · Confusion, seizures, uneven heartbeat, muscle cramps or spasms, numbness and tingling around the mouth, fingertips, or feet  · New or unusual thigh, hip, or groin pain  · Numbness or weakness in your arm or leg, or on one side of your body  · Pain in your lower leg (calf)  · Sudden or severe headache, problems with vision, speech, or walking  If you notice these less serious side effects, talk with your doctor:   · Headache  · Joint or muscle pain  · Pain, itching, burning, swelling, or a lump under your skin where the shot was given  If you notice other side effects that you think are caused by this medicine, tell your doctor  Call your doctor for medical advice about side effects  You may report side effects to FDA at 9-321-WCO-6261  © Copyright [employer] 2021 Information is for End User's use only and may not be sold, redistributed or otherwise used for commercial purposes  The above information is an  only  It is not intended as medical advice for individual conditions or treatments  Talk to your doctor, nurse or pharmacist before following any medical regimen to see if it is safe and effective for you  Abaloparatide (By injection)   Abaloparatide (a-bal-oh-PAR-a-tide)  Treats osteoporosis in postmenopausal women who are at high risk for bone fracture  Brand Name(s): Tymlos   There may be other brand names for this medicine  When This Medicine Should Not Be Used: This medicine is not right for everyone  You should not receive it if you had an allergic reaction to abaloparatide  How to Use This Medicine:   Injectable  · Your doctor will prescribe your exact dose and tell you how often it should be given  This medicine is given as a shot under your skin  It is usually given in the stomach area  · A nurse or other health provider will give you this medicine  · You may be taught how to give your medicine at home  Make sure you understand all instructions before giving yourself an injection  Do not use more medicine or use it more often than your doctor tells you to  · You should receive the first several injections of this medicine while sitting or lying down if needed, until you know how this medicine affects you  · You will be shown the body areas where this shot can be given  Use a different body area each time you give yourself a shot  Keep track of where you give each shot to make sure you rotate body areas  Do not inject into skin areas that are tender, bruised, red, scaly, or hard  · Use a new needle each time you inject your medicine  Do not store the prefilled pen with the needle attached    · If the medicine in the prefilled syringe has changed color, or if you see particles in it, do not use it  · You may take calcium and vitamin D supplements while you are using this medicine if needed  Follow your doctor's instructions about how to take these supplements  · This medicine comes with a Medication Guide and patient instructions  Read and follow the instructions carefully  Ask your doctor if you have any questions  · Missed dose: Take a dose as soon as you remember  If it is almost time for your next dose, wait until then and take a regular dose  Do not take extra medicine to make up for a missed dose  · Before first use: Store the medicine in the refrigerator  Do not freeze  · After first use: Store the medicine at room temperature, away from heat and direct light for up to 30 days  Do not freeze  Drugs and Foods to Avoid:      Ask your doctor or pharmacist before using any other medicine, including over-the-counter medicines, vitamins, and herbal products  Warnings While Using This Medicine:   · Tell your doctor if you are pregnant or breastfeeding, or if you have kidney disease, parathyroid disease, other bone disease (including Paget's disease of the bone), or a history of bone cancer  Tell your doctor if you had a bone radiation treatment  · This medicine may increase your risk of having a bone cancer  · This medicine may make you dizzy or drowsy  Do not drive or do anything that could be dangerous until you know how this medicine affects you  Stand or sit up slowly if you feel lightheaded or dizzy  · Your doctor will do lab tests at regular visits to check on the effects of this medicine  Keep all appointments  · Keep all medicine out of the reach of children  Never share your medicine with anyone  · Throw away used needles in a hard, closed container that the needles cannot poke through  Keep this container away from children and pets    Possible Side Effects While Using This Medicine: Call your doctor right away if you notice any of these side effects:  · Allergic reaction: Itching or hives, swelling in your face or hands, swelling or tingling in your mouth or throat, chest tightness, trouble breathing  · Dizziness, faintness, or lightheadedness  · Fast, pounding, uneven heartbeat  · Nausea, vomiting  · Pain in the lower back, side, or stomach  · Red or dark brown urine, painful urination  If you notice these less serious side effects, talk with your doctor:   · Headache  · Pain, itching, burning, swelling, or a lump under your skin where the shot was given  · Tiredness  If you notice other side effects that you think are caused by this medicine, tell your doctor  Call your doctor for medical advice about side effects  You may report side effects to FDA at 8-471-FDA-3703  © Copyright [employer] 2021 Information is for End User's use only and may not be sold, redistributed or otherwise used for commercial purposes  The above information is an  only  It is not intended as medical advice for individual conditions or treatments  Talk to your doctor, nurse or pharmacist before following any medical regimen to see if it is safe and effective for you  Teriparatide (By injection)   Teriparatide (ter-i-PAR-a-tide)  Treats osteoporosis (weak or brittle bones) in men, and in women who have gone through menopause  Also treats osteoporosis caused by steroid medicine  Brand Name(s): Forteo   There may be other brand names for this medicine  When This Medicine Should Not Be Used: This medicine is not right for everyone  Do not use it if you had an allergic reaction to teriparatide  How to Use This Medicine:   Injectable  · Your doctor will prescribe your exact dose and tell you how often it should be given  This medicine is given as a shot under your skin  It is usually given in the stomach or thigh  · A nurse or other health provider will give you this medicine    · You may be taught how to give your medicine at home  Make sure you understand all instructions before giving yourself an injection  Do not use more medicine or use it more often than your doctor tells you to  · You should receive the first several injections of this medicine while sitting or lying down if needed, until you know how this medicine affects you  · You will be shown the body areas where this shot can be given  Use a different body area each time you give yourself a shot  Keep track of where you give each shot to make sure you rotate body areas  · Use a new needle each time you inject your medicine  Do not store the prefilled pen with the needle attached  · If the medicine in the prefilled syringe has changed color, or if you see particles in it, do not use it  · You may take calcium and vitamin D supplements while you are using this medicine if needed  Follow your doctor's instructions about how to take these supplements  · This medicine comes with a Medication Guide and the User Manual  Read and follow the instructions carefully  Ask your doctor if you have any questions  · Missed dose: Take a dose as soon as you remember  If it is almost time for your next dose, wait until then and take a regular dose  Do not take extra medicine to make up for a missed dose  · If you store this medicine at home, keep it in the refrigerator  Do not freeze  · You might not use all of the medicine in each prefilled pen  Throw away any unused medicine after 28 days, even if there is still medicine in it  Drugs and Foods to Avoid:   Ask your doctor or pharmacist before using any other medicine, including over-the-counter medicines, vitamins, and herbal products  · Some medicines can affect how teriparatide works  Tell your doctor if you are using digoxin    Warnings While Using This Medicine:   · Tell your doctor if you are pregnant, or if you have kidney disease (including kidney stones), parathyroid disease, other bone disease (including Paget's disease of the bone), or a history of bone cancer  Tell your doctor if you had a bone radiation treatment  · Do not breastfeed during treatment with this medicine  · This medicine may increase your risk of having a bone cancer  · This medicine may make you dizzy or drowsy  Do not drive or do anything that could be dangerous until you know how this medicine affects you  Stand or sit up slowly if you feel lightheaded or dizzy  · Your doctor will do lab tests at regular visits to check on the effects of this medicine  Keep all appointments  · Keep all medicine out of the reach of children  Never share your medicine with anyone  · Throw away used needles in a hard, closed container that the needles cannot poke through  Keep this container away from children and pets  Possible Side Effects While Using This Medicine:   Call your doctor right away if you notice any of these side effects:  · Allergic reaction: Itching or hives, swelling in your face or hands, swelling or tingling in your mouth or throat, chest tightness, trouble breathing  · Dizziness, faintness, lightheadedness  · Fast, pounding, uneven heartbeat  · Joint or muscle pain  · Unusual tiredness or weakness  If you notice these less serious side effects, talk with your doctor:   · Constipation, diarrhea, nausea, upset stomach  · Headache  · Pain, itching, burning, swelling, or a lump under your skin where the shot was given  · Runny or stuffy nose, cough, sore throat  If you notice other side effects that you think are caused by this medicine, tell your doctor  Call your doctor for medical advice about side effects  You may report side effects to FDA at 3-664-WPK-2035  © Copyright [employer] 2021 Information is for End User's use only and may not be sold, redistributed or otherwise used for commercial purposes  The above information is an  only   It is not intended as medical advice for individual conditions or treatments  Talk to your doctor, nurse or pharmacist before following any medical regimen to see if it is safe and effective for you

## 2021-05-25 NOTE — PROGRESS NOTES
Shreyas Paniagua 80 y o  female MRN: [id number]    Encounter: [id number]      Assessment/Plan     Problem List Items Addressed This Visit        Endocrine    Thyroid nodule     Stable on serial imaging-will monitor periodically  Will check thyroid function tests         Relevant Orders    TSH, 3rd generation Lab Collect    T4, free Lab Collect       Musculoskeletal and Integument    Age-related osteoporosis without current pathological fracture     Patient has severe osteoporosis-has had compression fracture of the spine as well as wrist fracture in the past   Advised to take calcium 1200 mg daily as well as vitamin-D supplementations  Will work her up for any secondary causes of osteoporosis and check calcium, PTH, vitamin-D and phosphorus  I have discussed pharmacological therapy with her with anabolic agents either Forteo, tymlos, evenity as first-line or anti resorptive agents like Reclast of Prolia  Patient handout given, side effects discussed  She will let us know what she decides         Relevant Orders    Comprehensive metabolic panel Lab Collect    Vitamin D 25 hydroxy Lab Collect    PTH, intact Lab Collect Lab Collect    Phosphorus Lab Collect    History of vertebral compression fracture - Primary    Relevant Orders    Vitamin D 25 hydroxy Lab Collect    PTH, intact Lab Collect Lab Collect    Phosphorus Lab Collect        CC:   Osteoporosis    History of Present Illness     HPI:  28-year-old female referred here for evaluation of osteoporosis  She states that she was Recently diagnosed with Osteoporosis - never been treated pharmacologically    Right hip replacement for OA   Right shoulder replacement  For OA   Was in PT after shoulder replacement - slipped on hardwood floor and left wrist fracture - had a cast for 6 weeks   CXR - compression deformity of lower thoracic vertebrae   Calcium -600 mg daily ,0-1 serving of dairy a day  Vitamin D 3 2000 IU DAILY   Steady on feet, no frequent falls    Not on chronic steroids     No history of hyperthyroidism , hyperparathyroidism or kidney stones   Has history of thyroid nodules - has had FNAB in 2013 - nodules were stable on serial imaging       Stopped smoking in  , occasional wine     Mother had hip fracture at age 80, father -no    Was on provera for many years in her 45s and 46s      Lost 4 inches in height in the past 10 years             Review of Systems    Historical Information   Past Medical History:   Diagnosis Date    Arthritis     Disease of thyroid gland     thyroid nodules    Dry eye     Hard of hearing     Hematuria     Hypertension     Leaking of urine     Osteoarthritis     Peritonitis ([address] )     in age 44's due to ruptured apendix    Shoulder joint pain     right- reverse arthroplasty today 2020    Wears glasses     Wears partial dentures     upper     Past Surgical History:   Procedure Laterality Date    APPENDECTOMY      BREAST BIOPSY Right 02/15/2021    stereo    CHOLECYSTECTOMY LAPAROSCOPIC      COLONOSCOPY      HIP SURGERY      JOINT REPLACEMENT Right     hip    LAPAROSCOPIC CHOLECYSTECTOMY      LAPAROSCOPY      MAMMO STEREOTACTIC BREAST BIOPSY RIGHT (ALL INC) Right 2/15/2021    OH RECONSTR TOTAL SHOULDER IMPLANT Right 2020    Procedure: ARTHROPLASTY SHOULDER REVERSE;  Surgeon: Db Raymundo MD;  Location: [facility];  Service: Orthopedics    TONSILLECTOMY      TUBAL LIGATION      WISDOM TOOTH EXTRACTION       Social History   Social History     Substance and Sexual Activity   Alcohol Use Yes    Alcohol/week: 1 0 standard drinks    Types: 1 Glasses of wine per week    Frequency: 2-3 times a week    Drinks per session: 1 or 2    Binge frequency: Never     Social History     Substance and Sexual Activity   Drug Use Never     Social History     Tobacco Use   Smoking Status Former Smoker    Packs/day: 0 25    Years: 10 00    Pack years: 2 50    Quit date: 48    Years since quittin 4 Smokeless Tobacco Never Used     Family History:   Family History   Problem Relation Age of Onset    Hypertension Mother     Coronary artery disease Father     Hypertension Father     Hyperlipidemia Father     No Known Problems Brother     No Known Problems Daughter     No Known Problems Son     No Known Problems Maternal Grandmother     No Known Problems Maternal Grandfather     No Known Problems Paternal Grandmother     No Known Problems Paternal Grandfather     Alcohol abuse Neg Hx     Substance Abuse Neg Hx     Mental illness Neg Hx        Meds/Allergies   Current Outpatient Medications   Medication Sig Dispense Refill    Calcium Carbonate-Vit D-Min (CALCIUM 1200 PO) Take by mouth daily      Cholecalciferol (VITAMIN D) 50 MCG (2000 UT) tablet Take 2,000 Units by mouth daily      estradiol (ESTRACE) 0 1 mg/g vaginal cream Insert 0 5 g into the vagina daily for 14 days, THEN 1 g 2 (two) times a week  42 5 g 4    lisinopril (ZESTRIL) 40 mg tablet Take 1 tablet (40 mg total) by mouth daily 90 tablet 3    Multiple Vitamins-Minerals (MULTIVITAMIN WOMEN PO) Take 1 tablet by mouth daily       alendronate (FOSAMAX) 70 mg tablet TAKE 1 TABLET BY MOUTH EVERY 7 DAYS TAKE 30 MIN BEFORE BREAKFAST WITH 8 OZ OF WATER, STAY UPRIGHT FOR 1 HOUR (Patient not taking: Reported on 3/29/2021) 4 tablet 6    doxycycline hyclate (VIBRAMYCIN) 50 mg capsule TAKE 1 CAPSULE (50 MG TOTAL) BY MOUTH ONCE A WEEK  No current facility-administered medications for this visit  No Known Allergies    Objective   Vitals: Blood pressure 134/80, pulse 60, height 5' 3 5" (1 613 m), weight 56 2 kg (123 lb 12 8 oz)  Physical Exam  Vitals signs reviewed  Constitutional:       Appearance: Normal appearance  She is not ill-appearing or diaphoretic  Eyes:      General: No scleral icterus  Extraocular Movements: Extraocular movements intact  Neck:      Musculoskeletal: Neck supple     Cardiovascular:      Rate and Rhythm: Normal rate and regular rhythm  Heart sounds: Normal heart sounds  No murmur  Pulmonary:      Effort: Pulmonary effort is normal  No respiratory distress  Breath sounds: Normal breath sounds  No wheezing or rales  Abdominal:      General: There is no distension  Palpations: Abdomen is soft  Tenderness: There is no abdominal tenderness  Musculoskeletal:      Right lower leg: No edema  Left lower leg: No edema  Lymphadenopathy:      Cervical: No cervical adenopathy  Skin:     General: Skin is warm and dry  Neurological:      General: No focal deficit present  Mental Status: She is alert and oriented to person, place, and time  Psychiatric:         Mood and Affect: Mood normal          Behavior: Behavior normal          Thought Content: Thought content normal          Judgment: Judgment normal          The history was obtained from the review of the chart, patient      Lab Results:   Lab Results   Component Value Date/Time    Potassium 4 3 03/23/2021 09:01 AM    Potassium 3 6 08/26/2020 04:24 AM    Potassium 4 6 08/11/2020 07:15 AM    Chloride 100 03/23/2021 09:01 AM    Chloride 98 (L) 08/26/2020 04:24 AM    Chloride 101 08/11/2020 07:15 AM    CO2 27 03/23/2021 09:01 AM    CO2 27 08/26/2020 04:24 AM    CO2 27 08/11/2020 07:15 AM    BUN 16 03/23/2021 09:01 AM    BUN 6 08/26/2020 04:24 AM    BUN 16 08/11/2020 07:15 AM    Creatinine 0 52 (L) 03/23/2021 09:01 AM    Creatinine 0 67 08/26/2020 04:24 AM    Creatinine 0 54 (L) 08/11/2020 07:15 AM    Glucose, Fasting 95 03/23/2021 09:01 AM    Glucose, Fasting 98 08/11/2020 07:15 AM    Calcium 9 1 03/23/2021 09:01 AM    Calcium 8 5 08/26/2020 04:24 AM    Calcium 9 0 08/11/2020 07:15 AM    eGFR 92 03/23/2021 09:01 AM    eGFR 84 08/26/2020 04:24 AM    eGFR 91 08/11/2020 07:15 AM         Imaging Studies:            Results for orders placed during the hospital encounter of 02/03/21   DXA bone density spine hip and pelvis Impression 1  Osteoporosis  2   The 10 year risk of hip fracture is 39% with the 10 year risk of major osteoporotic fracture being 51% as calculated by the [facility]/WHO fracture risk assessment tool (FRAX)  3   The current NOF guidelines recommend treating patients with a T-score of -2 5 or less in the lumbar spine or hips, or in post-menopausal women and men over the age of 48 with low bone mass (osteopenia) and a FRAX 10 year risk score of >3% for hip   fracture and/or >20% for major osteoporotic fracture  4   The NOF recommends follow-up DXA in 1-2 years after initiating therapy for osteoporosis and every 2 years thereafter  More frequent evaluation is appropriate for patients with conditions associated with rapid bone loss, such as glucocorticoid   therapy  The interval between DXA screenings may be longer for individuals without major risk factors and initial T-score in the normal or upper low bone mass range  The FRAX algorithm has certain limitations:  -FRAX has not been validated in patients currently or previously treated with pharmacotherapy for osteoporosis  In such patients, clinical judgment must be exercised in interpreting FRAX scores  -Prior hip, vertebral and humeral fragility fractures appear to confer greater risk of subsequent fracture than fractures at other sites (this is especially true for individuals with severe vertebral fractures), but quantification of this incremental   risk is not possible with FRAX  -FRAX underestimates fracture risk in patients with history of multiple fragility fractures  -FRAX may underestimate fracture risk in patients with history of frequent falls   -It is not appropriate to use FRAX to monitor treatment response        WHO CLASSIFICATION:  Normal (a T-score of -1 0 or higher)  Low bone mineral density (a T-score of less than -1 0 but higher than -2 5)  Osteoporosis (a T-score of -2 5 or less)  Severe osteoporosis (a T-score of -2 5 or less with a fragility fracture)            Workstation performed: [id number]                  I have personally reviewed pertinent reports  Portions of the record may have been created with voice recognition software  Occasional wrong word or "sound a like" substitutions may have occurred due to the inherent limitations of voice recognition software  Read the chart carefully and recognize, using context, where substitutions have occurred

## 2021-05-27 NOTE — ASSESSMENT & PLAN NOTE
Patient has severe osteoporosis-has had compression fracture of the spine as well as wrist fracture in the past   Advised to take calcium 1200 mg daily as well as vitamin-D supplementations  Will work her up for any secondary causes of osteoporosis and check calcium, PTH, vitamin-D and phosphorus  I have discussed pharmacological therapy with her with anabolic agents either Forteo, tymlos, evenity as first-line or anti resorptive agents like Reclast of Prolia  Patient handout given, side effects discussed    She will let us know what she decides

## 2021-06-16 ENCOUNTER — LAB (OUTPATIENT)
Dept: LAB | Facility: CLINIC | Age: 81
End: 2021-06-16
Payer: COMMERCIAL

## 2021-06-16 DIAGNOSIS — Z87.81 HISTORY OF VERTEBRAL COMPRESSION FRACTURE: ICD-10-CM

## 2021-06-16 DIAGNOSIS — M81.0 AGE-RELATED OSTEOPOROSIS WITHOUT CURRENT PATHOLOGICAL FRACTURE: ICD-10-CM

## 2021-06-16 DIAGNOSIS — E04.1 THYROID NODULE: ICD-10-CM

## 2021-06-16 LAB
25(OH)D3 SERPL-MCNC: 70.4 NG/ML (ref 30–100)
ALBUMIN SERPL BCP-MCNC: 4 G/DL (ref 3.5–5)
ALP SERPL-CCNC: 74 U/L (ref 46–116)
ALT SERPL W P-5'-P-CCNC: 25 U/L (ref 12–78)
ANION GAP SERPL CALCULATED.3IONS-SCNC: 7 MMOL/L (ref 4–13)
AST SERPL W P-5'-P-CCNC: 22 U/L (ref 5–45)
BILIRUB SERPL-MCNC: 0.64 MG/DL (ref 0.2–1)
BUN SERPL-MCNC: 18 MG/DL (ref 5–25)
CALCIUM SERPL-MCNC: 9.6 MG/DL (ref 8.3–10.1)
CHLORIDE SERPL-SCNC: 103 MMOL/L (ref 100–108)
CO2 SERPL-SCNC: 25 MMOL/L (ref 21–32)
CREAT SERPL-MCNC: 0.52 MG/DL (ref 0.6–1.3)
GFR SERPL CREATININE-BSD FRML MDRD: 91 ML/MIN/1.73SQ M
GLUCOSE P FAST SERPL-MCNC: 82 MG/DL (ref 65–99)
PHOSPHATE SERPL-MCNC: 3.3 MG/DL (ref 2.3–4.1)
POTASSIUM SERPL-SCNC: 4.7 MMOL/L (ref 3.5–5.3)
PROT SERPL-MCNC: 6.6 G/DL (ref 6.4–8.2)
PTH-INTACT SERPL-MCNC: 34.2 PG/ML (ref 18.4–80.1)
SODIUM SERPL-SCNC: 135 MMOL/L (ref 136–145)
T4 FREE SERPL-MCNC: 0.88 NG/DL (ref 0.76–1.46)
TSH SERPL DL<=0.05 MIU/L-ACNC: 2.15 UIU/ML (ref 0.36–3.74)

## 2021-06-16 PROCEDURE — 84100 ASSAY OF PHOSPHORUS: CPT

## 2021-06-16 PROCEDURE — 83970 ASSAY OF PARATHORMONE: CPT

## 2021-06-16 PROCEDURE — 82306 VITAMIN D 25 HYDROXY: CPT

## 2021-06-16 PROCEDURE — 80053 COMPREHEN METABOLIC PANEL: CPT

## 2021-06-16 PROCEDURE — 36415 COLL VENOUS BLD VENIPUNCTURE: CPT

## 2021-06-16 PROCEDURE — 84443 ASSAY THYROID STIM HORMONE: CPT

## 2021-06-16 PROCEDURE — 84439 ASSAY OF FREE THYROXINE: CPT

## 2021-06-17 ENCOUNTER — TELEPHONE (OUTPATIENT)
Dept: ENDOCRINOLOGY | Facility: CLINIC | Age: 81
End: 2021-06-17

## 2021-06-17 NOTE — TELEPHONE ENCOUNTER
----- Message from Yuridia Clark MD sent at 6/17/2021  8:09 AM EDT -----  Please call the patient regarding labs - labs ok- continue calcium and vitamin D , discuss further upcoming appointment

## 2021-06-17 NOTE — RESULT ENCOUNTER NOTE
Please call the patient regarding labs - labs ok- continue calcium and vitamin D , discuss further upcoming appointment

## 2021-06-30 ENCOUNTER — TELEPHONE (OUTPATIENT)
Dept: ENDOCRINOLOGY | Facility: CLINIC | Age: 81
End: 2021-06-30

## 2021-06-30 ENCOUNTER — OFFICE VISIT (OUTPATIENT)
Dept: ENDOCRINOLOGY | Facility: CLINIC | Age: 81
End: 2021-06-30
Payer: COMMERCIAL

## 2021-06-30 VITALS
BODY MASS INDEX: 20.83 KG/M2 | SYSTOLIC BLOOD PRESSURE: 118 MMHG | DIASTOLIC BLOOD PRESSURE: 64 MMHG | HEART RATE: 79 BPM | WEIGHT: 122 LBS | HEIGHT: 64 IN

## 2021-06-30 DIAGNOSIS — E04.1 THYROID NODULE: Primary | ICD-10-CM

## 2021-06-30 DIAGNOSIS — M81.0 AGE-RELATED OSTEOPOROSIS WITHOUT CURRENT PATHOLOGICAL FRACTURE: ICD-10-CM

## 2021-06-30 PROCEDURE — 99214 OFFICE O/P EST MOD 30 MIN: CPT | Performed by: NURSE PRACTITIONER

## 2021-06-30 PROCEDURE — 3078F DIAST BP <80 MM HG: CPT | Performed by: NURSE PRACTITIONER

## 2021-06-30 PROCEDURE — 1160F RVW MEDS BY RX/DR IN RCRD: CPT | Performed by: NURSE PRACTITIONER

## 2021-06-30 PROCEDURE — 3074F SYST BP LT 130 MM HG: CPT | Performed by: NURSE PRACTITIONER

## 2021-06-30 PROCEDURE — 1036F TOBACCO NON-USER: CPT | Performed by: NURSE PRACTITIONER

## 2021-06-30 NOTE — PROGRESS NOTES
Established Patient Progress Note       Chief Complaint   Patient presents with    Osteoporosis        History of Present Illness: Zandra Lyons is a 80 y o  female with a history of severe osteoporosis and thyroid nodule  She was diagnosed with osteoporosis in February  Is not currently on pharmacologic treatment  She has had a previous compression fracture of spine and wrist fracture  She had a right shoulder and hip replacement for OA  She is currently taking calcium 600 mg daily plus dietary calcium  She is taking vitamin d3 2,000 units daily  She denies any recent falls or fractures  Options for pharmacologic treatment discussed at last visit  She is concerned after reading all the side effects but understands the importance  Is interested in starting Evenity  For the thyroid nodules she denies neck pain, dysphonia, dysphagia, or dyspnea           Patient Active Problem List   Diagnosis    Wears partial dentures    Hypertension    Dry eye    Disease of thyroid gland    Rotator cuff tear arthropathy, right    Nondisplaced fracture of distal end of left radius    Thyroid nodule    Age-related osteoporosis without current pathological fracture    History of vertebral compression fracture      Past Medical History:   Diagnosis Date    Arthritis     Disease of thyroid gland     thyroid nodules    Dry eye     Hard of hearing     Hematuria     Hypertension     Leaking of urine     Osteoarthritis     Peritonitis ([address] )     in age 44's due to ruptured apendix    Shoulder joint pain     right- reverse arthroplasty today 8/25/2020    Wears glasses     Wears partial dentures     upper      Past Surgical History:   Procedure Laterality Date    APPENDECTOMY      BREAST BIOPSY Right 02/15/2021    stereo    CHOLECYSTECTOMY LAPAROSCOPIC      COLONOSCOPY      HIP SURGERY      JOINT REPLACEMENT Right     hip    LAPAROSCOPIC CHOLECYSTECTOMY      LAPAROSCOPY      MAMMO STEREOTACTIC BREAST BIOPSY RIGHT (ALL INC) Right 2/15/2021    [place] RECONSTR TOTAL SHOULDER IMPLANT Right 2020    Procedure: ARTHROPLASTY SHOULDER REVERSE;  Surgeon: Molly Dailey MD;  Location[facility];  Service: Orthopedics    TONSILLECTOMY      TUBAL LIGATION      WISDOM TOOTH EXTRACTION        Family History   Problem Relation Age of Onset    Hypertension Mother     Coronary artery disease Father     Hypertension Father     Hyperlipidemia Father     No Known Problems Brother     No Known Problems Daughter     No Known Problems Son     No Known Problems Maternal Grandmother     No Known Problems Maternal Grandfather     No Known Problems Paternal Grandmother     No Known Problems Paternal Grandfather     Alcohol abuse Neg Hx     Substance Abuse Neg Hx     Mental illness Neg Hx      Social History     Tobacco Use    Smoking status: Former Smoker     Packs/day: 0 25     Years: 10 00     Pack years: 2 50     Quit date:      Years since quittin 5    Smokeless tobacco: Never Used   Substance Use Topics    Alcohol use:  Yes     Alcohol/week: 1 0 standard drinks     Types: 1 Glasses of wine per week     No Known Allergies    Current Outpatient Medications:     Calcium Carbonate-Vit D-Min (CALCIUM 1200 PO), Take by mouth daily, Disp: , Rfl:     estradiol (ESTRACE) 0 1 mg/g vaginal cream, Insert 0 5 g into the vagina daily for 14 days, THEN 1 g 2 (two) times a week , Disp: 42 5 g, Rfl: 4    lisinopril (ZESTRIL) 40 mg tablet, Take 1 tablet (40 mg total) by mouth daily, Disp: 90 tablet, Rfl: 3    Multiple Vitamins-Minerals (MULTIVITAMIN WOMEN PO), Take 1 tablet by mouth daily , Disp: , Rfl:     alendronate (FOSAMAX) 70 mg tablet, TAKE 1 TABLET BY MOUTH EVERY 7 DAYS TAKE 30 MIN BEFORE BREAKFAST WITH 8 OZ OF WATER, STAY UPRIGHT FOR 1 HOUR (Patient not taking: Reported on 3/29/2021), Disp: 4 tablet, Rfl: 6    Cholecalciferol (VITAMIN D) 50 MCG ( UT) tablet, Take 2,000 Units by mouth daily (Patient not taking: Reported on 6/30/2021), Disp: , Rfl:     doxycycline hyclate (VIBRAMYCIN) 50 mg capsule, TAKE 1 CAPSULE (50 MG TOTAL) BY MOUTH ONCE A WEEK  (Patient not taking: Reported on 6/30/2021), Disp: , Rfl:     Review of Systems   Constitutional: Negative for chills and fever  HENT: Negative for sore throat and trouble swallowing  Eyes: Negative for visual disturbance  Respiratory: Negative for shortness of breath  Cardiovascular: Negative for chest pain and palpitations  Gastrointestinal: Negative for abdominal pain, constipation and diarrhea  Endocrine: Negative for cold intolerance, heat intolerance, polydipsia, polyphagia and polyuria  Genitourinary: Negative for frequency  Musculoskeletal: Negative for arthralgias and myalgias  Skin: Negative for rash  Neurological: Negative for dizziness and tremors  Hematological: Negative for adenopathy  Psychiatric/Behavioral: Negative for sleep disturbance  All other systems reviewed and are negative  Physical Exam:  Body mass index is 21 21 kg/m²  /64   Pulse 79   Ht 5' 3 6" (1 615 m)   Wt 55 3 kg (122 lb)   BMI 21 21 kg/m²    Wt Readings from Last 3 Encounters:   06/30/21 55 3 kg (122 lb)   05/25/21 56 2 kg (123 lb 12 8 oz)   03/29/21 54 2 kg (119 lb 6 4 oz)       Physical Exam  Vitals reviewed  Constitutional:       General: She is not in acute distress  Appearance: She is well-developed  HENT:      Head: Normocephalic and atraumatic  Eyes:      Conjunctiva/sclera: Conjunctivae normal       Pupils: Pupils are equal, round, and reactive to light  Neck:      Thyroid: No thyromegaly  Cardiovascular:      Rate and Rhythm: Normal rate and regular rhythm  Heart sounds: Normal heart sounds  Pulmonary:      Effort: Pulmonary effort is normal  No respiratory distress  Breath sounds: Normal breath sounds  No wheezing or rales  Abdominal:      General: Bowel sounds are normal  There is no distension        Palpations: Abdomen is soft  Tenderness: There is no abdominal tenderness  Musculoskeletal:         General: Normal range of motion  Cervical back: Normal range of motion and neck supple  Skin:     General: Skin is warm and dry  Neurological:      Mental Status: She is alert and oriented to person, place, and time  Labs:     Component      Latest Ref Rng & Units 6/16/2021   Sodium      136 - 145 mmol/L 135 (L)   Potassium      3 5 - 5 3 mmol/L 4 7   Chloride      100 - 108 mmol/L 103   CO2      21 - 32 mmol/L 25   Anion Gap      4 - 13 mmol/L 7   BUN      5 - 25 mg/dL 18   Creatinine      0 60 - 1 30 mg/dL 0 52 (L)   GLUCOSE FASTING      65 - 99 mg/dL 82   Calcium      8 3 - 10 1 mg/dL 9 6   AST      5 - 45 U/L 22   ALT      12 - 78 U/L 25   Alkaline Phosphatase      46 - 116 U/L 74   Total Protein      6 4 - 8 2 g/dL 6 6   Albumin      3 5 - 5 0 g/dL 4 0   TOTAL BILIRUBIN      0 20 - 1 00 mg/dL 0 64   eGFR      ml/min/1 73sq m 91   TSH 3RD GENERATON      0 358 - 3 740 uIU/mL 2 150   Free T4      0 76 - 1 46 ng/dL 0 88   Vit D, 25-Hydroxy      30 0 - 100 0 ng/mL 70 4   PARATHYROID HORMONE      18 4 - 80 1 pg/mL 34 2   Phosphorus      2 3 - 4 1 mg/dL 3 3     DXA SCAN     CLINICAL HISTORY:  28-year-old postmenopausal female  OTHER RISK FACTORS:  History of fracture resulting from minor injury  Parental history of hip fracture after minor injury      PHARMACOLOGIC THERAPY FOR OSTEOPOROSIS:  None      TECHNIQUE: Bone densitometry was performed using a H[employer] Horizon A  bone densitometer  Regions of interest appear properly placed        COMPARISON: There are no prior DXA studies performed on this unit for comparison       RESULTS:      LUMBAR SPINE: Not assessed because  scoliosis and generalized spondylosis result in fewer than two evaluable vertebrae         LEFT  TOTAL HIP:   BMD:  0 633  gm/cm2    T-score:  -2 5     LEFT  FEMORAL NECK:   BMD:  0 510  gm/cm2   T score: -3 1      RIGHT  FOREARM:    33% RADIUS BMD:  0 472  gm/cm2  T-score:  -3 7         IMPRESSION:     1  Osteoporosis      2  The 10 year risk of hip fracture is 39% with the 10 year risk of major osteoporotic fracture being 51% as calculated by the [facility]/WHO fracture risk assessment tool (FRAX)     3  The current NOF guidelines recommend treating patients with a T-score of -2 5 or less in the lumbar spine or hips, or in post-menopausal women and men over the age of 48 with low bone mass (osteopenia) and a FRAX 10 year risk score of >3% for hip   fracture and/or >20% for major osteoporotic fracture      4  The NOF recommends follow-up DXA in 1-2 years after initiating therapy for osteoporosis and every 2 years thereafter  More frequent evaluation is appropriate for patients with conditions associated with rapid bone loss, such as glucocorticoid   therapy  The interval between DXA screenings may be longer for individuals without major risk factors and initial T-score in the normal or upper low bone mass range  Impression & Plan:    Problem List Items Addressed This Visit        Endocrine    Thyroid nodule - Primary     Stable, will continue to monitor periodically             Musculoskeletal and Integument    Age-related osteoporosis without current pathological fracture     Patient open to starting Evenity  Will submit prior auth  Continue calcium through diet and supplementation  Continue vitamin d supplementation  Educated on falls prevention and importance of weight bearing exercise  Discussed with the patient and all questioned fully answered  She will call me if any problems arise        Counseled patient on diagnostic results, prognosis, risk and benefit of treatment options, instruction for management, importance of treatment compliance, Risk  factor reduction and impressions      Marvin Singer [address]

## 2021-07-01 NOTE — ASSESSMENT & PLAN NOTE
Patient open to starting Evenity  Will submit prior auth  Continue calcium through diet and supplementation  Continue vitamin d supplementation  Educated on falls prevention and importance of weight bearing exercise

## 2021-07-06 NOTE — TELEPHONE ENCOUNTER
Attempted to submit PA request, but [employer] is experiencing connection issues  Will try again later

## 2021-07-08 ENCOUNTER — TELEPHONE (OUTPATIENT)
Dept: ENDOCRINOLOGY | Facility: CLINIC | Age: 81
End: 2021-07-08

## 2021-07-08 NOTE — TELEPHONE ENCOUNTER
Completed PA form with additional clinical questions and faxed to [id number] @ [phone]

## 2021-07-13 NOTE — TELEPHONE ENCOUNTER
Called Jerry to check on PA status and spoke to [employer]  She informed me that PA request was denied  Reason for denial pt must try and fail formulary alternatives: Forteo or Tymlos  She will be refaxing PA denial letter to the office  Call reference # Eileen Mcdonnell 07/13/2021 3:36 PM    Please advise

## 2021-07-14 NOTE — TELEPHONE ENCOUNTER
IF she does not want to inject herself, the other option would be yearly Reclast or Prolia injections every 6 months  It looks like Dr Veronica Trevino discussed all options at visit

## 2021-07-14 NOTE — TELEPHONE ENCOUNTER
Spoke to pt  She is aware of the denial b/c Jerry notified her  She is not happy about taking Tymlos or Forteo  She understands why it was denied and doesn't know what to do at this point  She does not want to be injecting herself everyday for 2 years  She would like a call back to discuss further

## 2021-07-14 NOTE — TELEPHONE ENCOUNTER
Please let patient know Insurance will not cover evenity, they will cover Forteo or Tymlos which are daily injections that would be given at home  Dr Ines Jimenez did discuss these options at the initial consult  The two medications are very similar  Would she be agreeable to starting one of these?

## 2021-07-15 NOTE — TELEPHONE ENCOUNTER
Called and spoke to pt  She is going to read about the Reclast before making a decision  She will call us back with her choice

## 2021-07-22 ENCOUNTER — TELEPHONE (OUTPATIENT)
Dept: ENDOCRINOLOGY | Facility: CLINIC | Age: 81
End: 2021-07-22

## 2021-07-22 NOTE — TELEPHONE ENCOUNTER
Per henry message in notes  Patient iscalling back that she does not wish to try anything right now and follow a diet    Thank you

## 2021-07-22 NOTE — TELEPHONE ENCOUNTER
Spoke to patient and she knows she has a lot of bone loss but she is afraid  of the side effects  So she is strongly against any osteoporosis medication

## 2021-09-29 ENCOUNTER — APPOINTMENT (OUTPATIENT)
Dept: LAB | Facility: CLINIC | Age: 81
End: 2021-09-29
Payer: COMMERCIAL

## 2021-09-29 DIAGNOSIS — E04.1 THYROID NODULE: ICD-10-CM

## 2021-09-29 DIAGNOSIS — I10 ESSENTIAL HYPERTENSION: ICD-10-CM

## 2021-09-29 LAB
ALBUMIN SERPL BCP-MCNC: 3.6 G/DL (ref 3.5–5)
ALP SERPL-CCNC: 74 U/L (ref 46–116)
ALT SERPL W P-5'-P-CCNC: 29 U/L (ref 12–78)
ANION GAP SERPL CALCULATED.3IONS-SCNC: 5 MMOL/L (ref 4–13)
AST SERPL W P-5'-P-CCNC: 24 U/L (ref 5–45)
BACTERIA UR QL AUTO: ABNORMAL /HPF
BASOPHILS # BLD AUTO: 0.02 THOUSANDS/ΜL (ref 0–0.1)
BASOPHILS NFR BLD AUTO: 0 % (ref 0–1)
BILIRUB SERPL-MCNC: 0.48 MG/DL (ref 0.2–1)
BILIRUB UR QL STRIP: NEGATIVE
BUN SERPL-MCNC: 18 MG/DL (ref 5–25)
CALCIUM SERPL-MCNC: 9.1 MG/DL (ref 8.3–10.1)
CHLORIDE SERPL-SCNC: 99 MMOL/L (ref 100–108)
CHOLEST SERPL-MCNC: 196 MG/DL (ref 50–200)
CLARITY UR: ABNORMAL
CO2 SERPL-SCNC: 27 MMOL/L (ref 21–32)
COLOR UR: YELLOW
CREAT SERPL-MCNC: 0.54 MG/DL (ref 0.6–1.3)
EOSINOPHIL # BLD AUTO: 0.12 THOUSAND/ΜL (ref 0–0.61)
EOSINOPHIL NFR BLD AUTO: 3 % (ref 0–6)
ERYTHROCYTE [DISTWIDTH] IN BLOOD BY AUTOMATED COUNT: 13 % (ref 11.6–15.1)
GFR SERPL CREATININE-BSD FRML MDRD: 90 ML/MIN/1.73SQ M
GLUCOSE P FAST SERPL-MCNC: 86 MG/DL (ref 65–99)
GLUCOSE UR STRIP-MCNC: NEGATIVE MG/DL
HCT VFR BLD AUTO: 40.7 % (ref 34.8–46.1)
HDLC SERPL-MCNC: 99 MG/DL
HGB BLD-MCNC: 13.6 G/DL (ref 11.5–15.4)
HGB UR QL STRIP.AUTO: ABNORMAL
IMM GRANULOCYTES # BLD AUTO: 0.01 THOUSAND/UL (ref 0–0.2)
IMM GRANULOCYTES NFR BLD AUTO: 0 % (ref 0–2)
KETONES UR STRIP-MCNC: NEGATIVE MG/DL
LDLC SERPL CALC-MCNC: 87 MG/DL (ref 0–100)
LEUKOCYTE ESTERASE UR QL STRIP: ABNORMAL
LYMPHOCYTES # BLD AUTO: 1.49 THOUSANDS/ΜL (ref 0.6–4.47)
LYMPHOCYTES NFR BLD AUTO: 34 % (ref 14–44)
MCH RBC QN AUTO: 32.9 PG (ref 26.8–34.3)
MCHC RBC AUTO-ENTMCNC: 33.4 G/DL (ref 31.4–37.4)
MCV RBC AUTO: 98 FL (ref 82–98)
MONOCYTES # BLD AUTO: 0.52 THOUSAND/ΜL (ref 0.17–1.22)
MONOCYTES NFR BLD AUTO: 12 % (ref 4–12)
NEUTROPHILS # BLD AUTO: 2.29 THOUSANDS/ΜL (ref 1.85–7.62)
NEUTS SEG NFR BLD AUTO: 51 % (ref 43–75)
NITRITE UR QL STRIP: POSITIVE
NON-SQ EPI CELLS URNS QL MICRO: ABNORMAL /HPF
NRBC BLD AUTO-RTO: 0 /100 WBCS
PH UR STRIP.AUTO: 6.5 [PH]
PLATELET # BLD AUTO: 292 THOUSANDS/UL (ref 149–390)
PMV BLD AUTO: 9.3 FL (ref 8.9–12.7)
POTASSIUM SERPL-SCNC: 4.5 MMOL/L (ref 3.5–5.3)
PROT SERPL-MCNC: 6.6 G/DL (ref 6.4–8.2)
PROT UR STRIP-MCNC: NEGATIVE MG/DL
RBC # BLD AUTO: 4.14 MILLION/UL (ref 3.81–5.12)
RBC #/AREA URNS AUTO: ABNORMAL /HPF
SODIUM SERPL-SCNC: 131 MMOL/L (ref 136–145)
SP GR UR STRIP.AUTO: 1.02 (ref 1–1.03)
TRIGL SERPL-MCNC: 52 MG/DL
TSH SERPL DL<=0.05 MIU/L-ACNC: 2.21 UIU/ML (ref 0.36–3.74)
UROBILINOGEN UR QL STRIP.AUTO: 0.2 E.U./DL
WBC # BLD AUTO: 4.45 THOUSAND/UL (ref 4.31–10.16)
WBC #/AREA URNS AUTO: ABNORMAL /HPF

## 2021-09-29 PROCEDURE — 36415 COLL VENOUS BLD VENIPUNCTURE: CPT

## 2021-09-29 PROCEDURE — 84443 ASSAY THYROID STIM HORMONE: CPT

## 2021-09-29 PROCEDURE — 80053 COMPREHEN METABOLIC PANEL: CPT

## 2021-09-29 PROCEDURE — 80061 LIPID PANEL: CPT

## 2021-09-29 PROCEDURE — 85025 COMPLETE CBC W/AUTO DIFF WBC: CPT

## 2021-09-29 PROCEDURE — 81001 URINALYSIS AUTO W/SCOPE: CPT

## 2021-10-04 ENCOUNTER — OFFICE VISIT (OUTPATIENT)
Dept: FAMILY MEDICINE CLINIC | Facility: CLINIC | Age: 81
End: 2021-10-04
Payer: COMMERCIAL

## 2021-10-04 VITALS
WEIGHT: 123.4 LBS | SYSTOLIC BLOOD PRESSURE: 122 MMHG | HEART RATE: 86 BPM | HEIGHT: 64 IN | BODY MASS INDEX: 21.07 KG/M2 | DIASTOLIC BLOOD PRESSURE: 80 MMHG

## 2021-10-04 DIAGNOSIS — E04.1 THYROID NODULE: ICD-10-CM

## 2021-10-04 DIAGNOSIS — M81.0 AGE-RELATED OSTEOPOROSIS WITHOUT CURRENT PATHOLOGICAL FRACTURE: ICD-10-CM

## 2021-10-04 DIAGNOSIS — R30.0 DYSURIA: ICD-10-CM

## 2021-10-04 DIAGNOSIS — E87.1 HYPONATREMIA: ICD-10-CM

## 2021-10-04 DIAGNOSIS — I10 ESSENTIAL HYPERTENSION: Primary | ICD-10-CM

## 2021-10-04 PROCEDURE — 87086 URINE CULTURE/COLONY COUNT: CPT | Performed by: FAMILY MEDICINE

## 2021-10-04 PROCEDURE — 1036F TOBACCO NON-USER: CPT | Performed by: FAMILY MEDICINE

## 2021-10-04 PROCEDURE — 99214 OFFICE O/P EST MOD 30 MIN: CPT | Performed by: FAMILY MEDICINE

## 2021-10-04 PROCEDURE — 3725F SCREEN DEPRESSION PERFORMED: CPT | Performed by: FAMILY MEDICINE

## 2021-10-04 PROCEDURE — 3079F DIAST BP 80-89 MM HG: CPT | Performed by: FAMILY MEDICINE

## 2021-10-04 PROCEDURE — 3074F SYST BP LT 130 MM HG: CPT | Performed by: FAMILY MEDICINE

## 2021-10-04 PROCEDURE — 1160F RVW MEDS BY RX/DR IN RCRD: CPT | Performed by: FAMILY MEDICINE

## 2021-10-04 RX ORDER — CEPHALEXIN 500 MG/1
CAPSULE ORAL
COMMUNITY
Start: 2021-09-09

## 2021-10-06 LAB — BACTERIA UR CULT: ABNORMAL

## 2021-11-23 ENCOUNTER — OFFICE VISIT (OUTPATIENT)
Dept: DERMATOLOGY | Facility: CLINIC | Age: 81
End: 2021-11-23
Payer: COMMERCIAL

## 2021-11-23 VITALS — HEIGHT: 64 IN | BODY MASS INDEX: 20.66 KG/M2 | TEMPERATURE: 98.2 F | WEIGHT: 121 LBS

## 2021-11-23 DIAGNOSIS — Z85.89 HISTORY OF SQUAMOUS CELL CARCINOMA: Primary | ICD-10-CM

## 2021-11-23 DIAGNOSIS — D22.9 MULTIPLE BENIGN MELANOCYTIC NEVI: ICD-10-CM

## 2021-11-23 DIAGNOSIS — K13.0 PERLECHE: ICD-10-CM

## 2021-11-23 PROCEDURE — 1160F RVW MEDS BY RX/DR IN RCRD: CPT | Performed by: DERMATOLOGY

## 2021-11-23 PROCEDURE — 1036F TOBACCO NON-USER: CPT | Performed by: DERMATOLOGY

## 2021-11-23 PROCEDURE — 99203 OFFICE O/P NEW LOW 30 MIN: CPT | Performed by: DERMATOLOGY

## 2022-01-05 ENCOUNTER — TELEPHONE (OUTPATIENT)
Dept: FAMILY MEDICINE CLINIC | Facility: CLINIC | Age: 82
End: 2022-01-05

## 2022-02-07 ENCOUNTER — HOSPITAL ENCOUNTER (OUTPATIENT)
Dept: MAMMOGRAPHY | Facility: IMAGING CENTER | Age: 82
Discharge: HOME | End: 2022-02-07
Payer: COMMERCIAL

## 2022-02-07 VITALS — HEIGHT: 64 IN | BODY MASS INDEX: 21 KG/M2 | WEIGHT: 123 LBS

## 2022-02-07 DIAGNOSIS — Z12.31 ENCOUNTER FOR SCREENING MAMMOGRAM FOR MALIGNANT NEOPLASM OF BREAST: ICD-10-CM

## 2022-02-07 PROCEDURE — 77063 BREAST TOMOSYNTHESIS BI: CPT

## 2022-02-07 PROCEDURE — 77067 SCR MAMMO BI INCL CAD: CPT

## 2022-03-29 ENCOUNTER — APPOINTMENT (OUTPATIENT)
Dept: LAB | Facility: CLINIC | Age: 82
End: 2022-03-29
Payer: COMMERCIAL

## 2022-03-29 DIAGNOSIS — I10 ESSENTIAL HYPERTENSION: ICD-10-CM

## 2022-03-29 DIAGNOSIS — E87.1 HYPONATREMIA: ICD-10-CM

## 2022-03-29 LAB
ANION GAP SERPL CALCULATED.3IONS-SCNC: 7 MMOL/L (ref 4–13)
BUN SERPL-MCNC: 26 MG/DL (ref 5–25)
CALCIUM SERPL-MCNC: 9.3 MG/DL (ref 8.3–10.1)
CHLORIDE SERPL-SCNC: 100 MMOL/L (ref 100–108)
CO2 SERPL-SCNC: 25 MMOL/L (ref 21–32)
CREAT SERPL-MCNC: 0.63 MG/DL (ref 0.6–1.3)
GFR SERPL CREATININE-BSD FRML MDRD: 85 ML/MIN/1.73SQ M
GLUCOSE P FAST SERPL-MCNC: 98 MG/DL (ref 65–99)
POTASSIUM SERPL-SCNC: 4.5 MMOL/L (ref 3.5–5.3)
SODIUM SERPL-SCNC: 132 MMOL/L (ref 136–145)

## 2022-03-29 PROCEDURE — 36415 COLL VENOUS BLD VENIPUNCTURE: CPT

## 2022-03-29 PROCEDURE — 80048 BASIC METABOLIC PNL TOTAL CA: CPT

## 2022-03-31 DIAGNOSIS — N95.2 VAGINAL ATROPHY: ICD-10-CM

## 2022-03-31 RX ORDER — ESTRADIOL 0.1 MG/G
1 CREAM VAGINAL 2 TIMES WEEKLY
Qty: 42.5 G | Refills: 0 | Status: SHIPPED | OUTPATIENT
Start: 2022-03-31 | End: 2022-07-12 | Stop reason: SDUPTHER

## 2022-03-31 RX ORDER — ESTRADIOL 0.1 MG/G
0.5 CREAM VAGINAL 2 TIMES WEEKLY
Qty: 30 G | Refills: 1 | Status: SHIPPED | OUTPATIENT
Start: 2022-03-31 | End: 2022-03-31

## 2022-03-31 RX ORDER — ESTRADIOL 0.1 MG/G
0.5 CREAM VAGINAL 2 TIMES WEEKLY
Qty: 42.5 G | Refills: 1 | Status: SHIPPED | OUTPATIENT
Start: 2022-03-31 | End: 2022-03-31

## 2022-04-05 ENCOUNTER — OFFICE VISIT (OUTPATIENT)
Dept: FAMILY MEDICINE CLINIC | Facility: CLINIC | Age: 82
End: 2022-04-05
Payer: COMMERCIAL

## 2022-04-05 VITALS
BODY MASS INDEX: 20.86 KG/M2 | HEIGHT: 64 IN | RESPIRATION RATE: 16 BRPM | HEART RATE: 79 BPM | DIASTOLIC BLOOD PRESSURE: 80 MMHG | WEIGHT: 122.2 LBS | SYSTOLIC BLOOD PRESSURE: 138 MMHG

## 2022-04-05 DIAGNOSIS — I10 ESSENTIAL HYPERTENSION: Primary | ICD-10-CM

## 2022-04-05 DIAGNOSIS — N39.3 STRESS INCONTINENCE OF URINE: ICD-10-CM

## 2022-04-05 DIAGNOSIS — Z00.00 MEDICARE ANNUAL WELLNESS VISIT, SUBSEQUENT: ICD-10-CM

## 2022-04-05 DIAGNOSIS — R31.29 HEMATURIA, MICROSCOPIC: ICD-10-CM

## 2022-04-05 DIAGNOSIS — E87.1 HYPONATREMIA: ICD-10-CM

## 2022-04-05 DIAGNOSIS — E04.1 THYROID NODULE: ICD-10-CM

## 2022-04-05 DIAGNOSIS — M81.0 AGE-RELATED OSTEOPOROSIS WITHOUT CURRENT PATHOLOGICAL FRACTURE: ICD-10-CM

## 2022-04-05 PROCEDURE — 99214 OFFICE O/P EST MOD 30 MIN: CPT | Performed by: FAMILY MEDICINE

## 2022-04-05 PROCEDURE — 3079F DIAST BP 80-89 MM HG: CPT | Performed by: FAMILY MEDICINE

## 2022-04-05 PROCEDURE — 3075F SYST BP GE 130 - 139MM HG: CPT | Performed by: FAMILY MEDICINE

## 2022-04-05 PROCEDURE — 3725F SCREEN DEPRESSION PERFORMED: CPT | Performed by: FAMILY MEDICINE

## 2022-04-05 PROCEDURE — G0439 PPPS, SUBSEQ VISIT: HCPCS | Performed by: FAMILY MEDICINE

## 2022-04-05 PROCEDURE — 1090F PRES/ABSN URINE INCON ASSESS: CPT | Performed by: FAMILY MEDICINE

## 2022-04-05 PROCEDURE — 1036F TOBACCO NON-USER: CPT | Performed by: FAMILY MEDICINE

## 2022-04-05 PROCEDURE — 3288F FALL RISK ASSESSMENT DOCD: CPT | Performed by: FAMILY MEDICINE

## 2022-04-05 PROCEDURE — 1125F AMNT PAIN NOTED PAIN PRSNT: CPT | Performed by: FAMILY MEDICINE

## 2022-04-05 PROCEDURE — 1170F FXNL STATUS ASSESSED: CPT | Performed by: FAMILY MEDICINE

## 2022-04-05 PROCEDURE — 1160F RVW MEDS BY RX/DR IN RCRD: CPT | Performed by: FAMILY MEDICINE

## 2022-04-05 RX ORDER — LISINOPRIL 40 MG/1
40 TABLET ORAL DAILY
Qty: 90 TABLET | Refills: 3 | Status: SHIPPED | OUTPATIENT
Start: 2022-04-05

## 2022-04-05 NOTE — PROGRESS NOTES
Assessment/Plan:    1  Essential hypertension  - well controlled on lisinopril 40 mg daily  - will recheck lab work prior to next visit  - lisinopril (ZESTRIL) 40 mg tablet; Take 1 tablet (40 mg total) by mouth daily  Dispense: 90 tablet; Refill: 3  - Lipid Panel with Direct LDL reflex; Future  - Comprehensive metabolic panel; Future  - CBC and differential; Future  - TSH, 3rd generation with Free T4 reflex; Future    2  Hyponatremia  - sodium 132, stable, will continue monitoring and recheck prior to next visit  - Comprehensive metabolic panel; Future    3  Osteoporosis  - following with Endocrine, treatment options were discussed with patient, she is not interested at this time, advised to continue calcium/ vitamin D supplements and regular weight bearing exercises, repeat DEXA scan in due in 02/2023    4  Thyroid nodule  - following with Endocrine    5  Stress incontinence of urine  - following with Urology and starting pelvic floor PT on 04/21/22    6  Hematuria, microscopic  - following with Urology and is scheduled for cystoscopy on 06/13/22       Follow up in 6 months or sooner if needed  The patient understands and agrees with the treatment plan  Subjective:   Chief Complaint   Patient presents with    Medicare Wellness Visit    Hypertension    Osteoporosis      Patient ID: Maren Miles is a 80 y o  female who presents today for follow up visit for hypertension  Patient is following with Urology for microscopic hematuria and WILLIAM, she was referred to PT which she is scheduled to start on 04/21/22, she is also scheduled for cystoscopy in June  Patient offers no other complaints, she is compliant with her lisinopril and reports good home BP readings, she is physically active, she goes for 2 mile walks several days a week         The following portions of the patient's history were reviewed and updated as appropriate: allergies, current medications, past family history, past medical history, past social history, past surgical history and problem list     Past Medical History:   Diagnosis Date    Arthritis     Disease of thyroid gland     thyroid nodules    Dry eye     Hard of hearing     Hematuria     Hypertension     Leaking of urine     Osteoarthritis     Peritonitis ([address] )     in age 44's due to ruptured apendix    Shoulder joint pain     right- reverse arthroplasty today 2020    Squamous cell skin cancer     Wears glasses     Wears partial dentures     upper     Past Surgical History:   Procedure Laterality Date    APPENDECTOMY      BREAST BIOPSY Right 02/15/2021    stereo, benign    CHOLECYSTECTOMY LAPAROSCOPIC      COLONOSCOPY      HIP SURGERY      JOINT REPLACEMENT Right     hip    LAPAROSCOPIC CHOLECYSTECTOMY      LAPAROSCOPY      MAMMO STEREOTACTIC BREAST BIOPSY RIGHT (ALL INC) Right 2/15/2021    [place] RECONSTR TOTAL SHOULDER IMPLANT Right 2020    Procedure: ARTHROPLASTY SHOULDER REVERSE;  Surgeon: Radha Cruz MD;  Location[facility];  Service: Orthopedics    TONSILLECTOMY      TUBAL LIGATION      WISDOM TOOTH EXTRACTION       Family History   Problem Relation Age of Onset    Hypertension Mother             Coronary artery disease Father    Juan Carlos Flower Hypertension Father             Hyperlipidemia Father     No Known Problems Brother     No Known Problems Daughter     No Known Problems Son     No Known Problems Maternal Grandmother     No Known Problems Maternal Grandfather     No Known Problems Paternal Grandmother     No Known Problems Paternal Grandfather     Alcohol abuse Neg Hx     Substance Abuse Neg Hx     Mental illness Neg Hx      Social History     Socioeconomic History    Marital status: /Civil Union     Spouse name: Not on file    Number of children: Not on file    Years of education: Not on file    Highest education level: Not on file   Occupational History    Not on file   Tobacco Use    Smoking status: Former Smoker Packs/day: 0 25     Years: 10 00     Pack years: 2 50     Quit date: 48     Years since quittin 2    Smokeless tobacco: Never Used   Vaping Use    Vaping Use: Never used   Substance and Sexual Activity    Alcohol use: Yes     Alcohol/week: 1 0 standard drink     Types: 1 Glasses of wine per week    Drug use: Never    Sexual activity: Not Currently     Partners: Male     Birth control/protection: None   Other Topics Concern    Not on file   Social History Narrative    Not on file     Social Determinants of Health     Financial Resource Strain: Not on file   Food Insecurity: Not on file   Transportation Needs: Not on file   Physical Activity: Not on file   Stress: Not on file   Social Connections: Not on file   Intimate Partner Violence: Not on file   Housing Stability: Not on file       Current Outpatient Medications:     Calcium Carbonate-Vit D-Min (CALCIUM 1200 PO), Take by mouth daily, Disp: , Rfl:     cephalexin (KEFLEX) 500 mg capsule, TAKE 4 CAPSULES (500 MG) 1 HOUR PRIOR TO DENTAL PROCEDURE, Disp: , Rfl:     Cholecalciferol (VITAMIN D) 50 MCG (2000 UT) tablet, Take 2,000 Units by mouth daily , Disp: , Rfl:     estradiol (ESTRACE) 0 1 mg/g vaginal cream, Insert 1 g into the vagina 2 (two) times a week, Disp: 42 5 g, Rfl: 0    lisinopril (ZESTRIL) 40 mg tablet, Take 1 tablet (40 mg total) by mouth daily, Disp: 90 tablet, Rfl: 3    Multiple Vitamins-Minerals (MULTIVITAMIN WOMEN PO), Take 1 tablet by mouth daily , Disp: , Rfl:     Review of Systems   Constitutional: Negative for appetite change, chills, fatigue, fever and unexpected weight change  Respiratory: Negative for cough, shortness of breath and wheezing  Cardiovascular: Negative for chest pain, palpitations and leg swelling  Gastrointestinal: Negative for abdominal pain, blood in stool, constipation, diarrhea, nausea and vomiting  Genitourinary: Negative for dysuria, flank pain, frequency, pelvic pain and urgency  Neurological: Negative for dizziness, syncope, weakness, numbness and headaches  Hematological: Negative for adenopathy  Psychiatric/Behavioral: Negative for dysphoric mood and sleep disturbance  The patient is not nervous/anxious  Objective:    Vitals:    04/05/22 0950   BP: 138/80   Pulse: 79   Resp: 16   Weight: 55 4 kg (122 lb 3 2 oz)   Height: 5' 3 5" (1 613 m)        Physical Exam  Constitutional:       General: She is not in acute distress  Appearance: She is well-developed  Cardiovascular:      Rate and Rhythm: Normal rate and regular rhythm  Heart sounds: Normal heart sounds  No murmur heard  Pulmonary:      Effort: Pulmonary effort is normal  No respiratory distress  Breath sounds: Normal breath sounds  No wheezing, rhonchi or rales  Abdominal:      General: There is no distension  Palpations: Abdomen is soft  There is no mass  Tenderness: There is no abdominal tenderness  Musculoskeletal:      Cervical back: Neck supple  Right lower leg: No edema  Left lower leg: No edema  Lymphadenopathy:      Cervical: No cervical adenopathy  Neurological:      Mental Status: She is alert and oriented to person, place, and time  Psychiatric:         Mood and Affect: Mood normal          Behavior: Behavior normal          Thought Content:  Thought content normal           Lab Results   Component Value Date    CHOLESTEROL 196 09/29/2021     Lab Results   Component Value Date    HDL 99 09/29/2021     Lab Results   Component Value Date    TRIG 52 09/29/2021       Lab Results   Component Value Date    LDLCALC 87 09/29/2021     Lab Results   Component Value Date    SODIUM 132 (L) 03/29/2022    K 4 5 03/29/2022     03/29/2022    CO2 25 03/29/2022    BUN 26 (H) 03/29/2022    CREATININE 0 63 03/29/2022    GLUC 117 08/26/2020    CALCIUM 9 3 03/29/2022     Lab Results   Component Value Date    WBC 4 45 09/29/2021    HGB 13 6 09/29/2021    HCT 40 7 09/29/2021 MCV 98 09/29/2021     09/29/2021     Lab Results   Component Value Date    FLB2FYFZLMUI 2 210 09/29/2021      Ref Range  6/16/21     Vit D, 25-Hydroxy 30 0 - 100 0 ng/mL 70 4

## 2022-04-05 NOTE — PROGRESS NOTES
Assessment and Plan:     1  Medicare annual wellness visit, subsequent      Preventive health issues were discussed with patient, and age appropriate screening tests were ordered as noted in patient's After Visit Summary  Personalized health advice and appropriate referrals for health education or preventive services given if needed, as noted in patient's After Visit Summary       History of Present Illness:     Patient presents for Medicare Annual Wellness visit    Patient Care Team:  Ame Ward MD as PCP - General (Family Medicine)  Erika Bragg MD as PCP - Endocrinology (Endocrinology)     Problem List:     Patient Active Problem List   Diagnosis    Wears partial dentures    Hypertension    Dry eye    Disease of thyroid gland    Rotator cuff tear arthropathy, right    Nondisplaced fracture of distal end of left radius    Thyroid nodule    Age-related osteoporosis without current pathological fracture    History of vertebral compression fracture      Past Medical and Surgical History:     Past Medical History:   Diagnosis Date    Arthritis     Disease of thyroid gland     thyroid nodules    Dry eye     Hard of hearing     Hematuria     Hypertension     Leaking of urine     Osteoarthritis     Peritonitis ([address] )     in age 44's due to ruptured apendix    Shoulder joint pain     right- reverse arthroplasty today 8/25/2020    Squamous cell skin cancer     Wears glasses     Wears partial dentures     upper     Past Surgical History:   Procedure Laterality Date    APPENDECTOMY      BREAST BIOPSY Right 02/15/2021    stereo, benign    CHOLECYSTECTOMY LAPAROSCOPIC      COLONOSCOPY      HIP SURGERY      JOINT REPLACEMENT Right     hip    LAPAROSCOPIC CHOLECYSTECTOMY      LAPAROSCOPY      MAMMO STEREOTACTIC BREAST BIOPSY RIGHT (ALL INC) Right 2/15/2021    VA RECONSTR TOTAL SHOULDER IMPLANT Right 8/25/2020    Procedure: ARTHROPLASTY SHOULDER REVERSE;  Surgeon: Daphney Cartwright MD; Location: [facility] OR;  Service: Orthopedics    TONSILLECTOMY      TUBAL LIGATION      WISDOM TOOTH EXTRACTION        Family History:     Family History   Problem Relation Age of Onset   Cristina Mcleoder Hypertension Mother             Coronary artery disease Father    Cristinabaljit Mcleoder Hypertension Father             Hyperlipidemia Father     No Known Problems Brother     No Known Problems Daughter     No Known Problems Son     No Known Problems Maternal Grandmother     No Known Problems Maternal Grandfather     No Known Problems Paternal Grandmother     No Known Problems Paternal Grandfather     Alcohol abuse Neg Hx     Substance Abuse Neg Hx     Mental illness Neg Hx       Social History:     Social History     Socioeconomic History    Marital status: /Civil Union     Spouse name: None    Number of children: None    Years of education: None    Highest education level: None   Occupational History    None   Tobacco Use    Smoking status: Former Smoker     Packs/day: 0 25     Years: 10 00     Pack years: 2 50     Quit date:      Years since quittin 2    Smokeless tobacco: Never Used   Vaping Use    Vaping Use: Never used   Substance and Sexual Activity    Alcohol use:  Yes     Alcohol/week: 1 0 standard drink     Types: 1 Glasses of wine per week    Drug use: Never    Sexual activity: Not Currently     Partners: Male     Birth control/protection: None   Other Topics Concern    None   Social History Narrative    None     Social Determinants of Health     Financial Resource Strain: Not on file   Food Insecurity: Not on file   Transportation Needs: Not on file   Physical Activity: Not on file   Stress: Not on file   Social Connections: Not on file   Intimate Partner Violence: Not on file   Housing Stability: Not on file      Medications and Allergies:     Current Outpatient Medications   Medication Sig Dispense Refill    Calcium Carbonate-Vit D-Min (CALCIUM 1200 PO) Take by mouth daily      cephalexin (KEFLEX) 500 mg capsule TAKE 4 CAPSULES (500 MG) 1 HOUR PRIOR TO DENTAL PROCEDURE      Cholecalciferol (VITAMIN D) 50 MCG (2000 UT) tablet Take 2,000 Units by mouth daily       estradiol (ESTRACE) 0 1 mg/g vaginal cream Insert 1 g into the vagina 2 (two) times a week 42 5 g 0    lisinopril (ZESTRIL) 40 mg tablet Take 1 tablet (40 mg total) by mouth daily 90 tablet 3    Multiple Vitamins-Minerals (MULTIVITAMIN WOMEN PO) Take 1 tablet by mouth daily        No current facility-administered medications for this visit  No Known Allergies   Immunizations:     Immunization History   Administered Date(s) Administered    COVID-19 MODERNA VACC 0 5 ML IM 01/27/2021, 02/24/2021    INFLUENZA 09/24/2014, 10/02/2017, 09/29/2021    Influenza Split High Dose Preservative Free IM 10/11/2018, 09/30/2019    Zoster Vaccine Recombinant 07/28/2020    influenza, trivalent, adjuvanted 09/23/2020      Health Maintenance:         Topic Date Due    Hepatitis C Screening  11/04/2023 (Originally 1940)    Cervical Cancer Screening  01/26/2023    DXA SCAN  02/03/2023    Breast Cancer Screening: Mammogram  02/07/2023         Topic Date Due    DTaP,Tdap,and Td Vaccines (1 - Tdap) Never done    Pneumococcal Vaccine: 65+ Years (1 of 1 - PPSV23) Never done    COVID-19 Vaccine (3 - Booster for Moderna series) 07/24/2021      Medicare Health Risk Assessment:     /80   Pulse 79   Resp 16   Ht 5' 3 5" (1 613 m)   Wt 55 4 kg (122 lb 3 2 oz)   BMI 21 31 kg/m²      Mimi Abbott is here for her Subsequent Wellness visit  Health Risk Assessment:   Patient rates overall health as good  Patient feels that their physical health rating is same  Patient is satisfied with their life  Eyesight was rated as same  Hearing was rated as same  Patient feels that their emotional and mental health rating is same  Patients states they are never, rarely angry  Patient states they are sometimes unusually tired/fatigued   Pain experienced in the last 7 days has been none  Patient states that she has experienced no weight loss or gain in last 6 months  Depression Screening:   PHQ-2 Score: 0      Fall Risk Screening: In the past year, patient has experienced: no history of falling in past year      Urinary Incontinence Screening:   Patient has leaked urine accidently in the last six months  Home Safety:  Patient does not have trouble with stairs inside or outside of their home  Patient has working smoke alarms and has working carbon monoxide detector  Home safety hazards include: none  Nutrition:   Current diet is Regular  Medications:   Patient is not currently taking any over-the-counter supplements  Patient is able to manage medications  Activities of Daily Living (ADLs)/Instrumental Activities of Daily Living (IADLs):   Walk and transfer into and out of bed and chair?: Yes  Dress and groom yourself?: Yes    Bathe or shower yourself?: Yes    Feed yourself? Yes  Do your laundry/housekeeping?: Yes  Manage your money, pay your bills and track your expenses?: Yes  Make your own meals?: Yes    Do your own shopping?: Yes    Previous Hospitalizations:   Any hospitalizations or ED visits within the last 12 months?: No      Advance Care Planning:   Living will: Yes    Durable POA for healthcare:  Yes    Advanced directive: Yes      Cognitive Screening:   Provider or family/friend/caregiver concerned regarding cognition?: No    PREVENTIVE SCREENINGS      Cardiovascular Screening:    General: Screening Current      Diabetes Screening:     General: Screening Current      Colorectal Cancer Screening:     General: Patient Declines      Breast Cancer Screening:     General: Screening Current      Cervical Cancer Screening:    General: Screening Not Indicated      Osteoporosis Screening:    General: Screening Not Indicated and History Osteoporosis      Abdominal Aortic Aneurysm (AAA) Screening:        General: Screening Not Indicated      Lung Cancer Screening:     General: Screening Not Indicated      Hepatitis C Screening:    General: Screening Current    Screening, Brief Intervention, and Referral to Treatment (SBIRT)    Screening  Typical number of drinks in a day: 1  Typical number of drinks in a week: 5  Interpretation: Low risk drinking behavior  AUDIT-C Screenin) How often did you have a drink containing alcohol in the past year? 4 or more times a week  2) How many drinks did you have on a typical day when you were drinking in the past year? 1 to 2  3) How often did you have 6 or more drinks on one occasion in the past year? never    AUDIT-C Score: 4  Interpretation: Score 3-12 (female): POSITIVE screen for alcohol misuse    AUDIT Screenin) How often during the last year have you found that you were not able to stop drinking once you had started? 0 - never  5) How often during the last year have you failed to do what was normally expected from you because of drinking? 0 - never  6) How often during the last year have you needed a first drink in the morning to get yourself going after a heavy drinking session?  0 - never  7) How often during the last year have you had a feeling of guilt or remorse after drinking? 0 - never  8) How often during the last year have you been unable to remember what happened the night before because you had been drinking? 0 - never  9) Have you or someone else been injured as a result of your drinking? 0 - no  10) Has a relative or friend or a doctor or another health worker been concerned about your drinking or suggested you cut down? 0 - no    AUDIT Score: 4  Interpretation: Low risk alcohol consumption    Single Item Drug Screening:  How often have you used an illegal drug (including marijuana) or a prescription medication for non-medical reasons in the past year? never    Single Item Drug Screen Score: 0  Interpretation: Negative screen for possible drug use disorder      Jesse Huddleston Holly Barcenas MD

## 2022-04-05 NOTE — PATIENT INSTRUCTIONS
Medicare Preventive Visit Patient Instructions  Thank you for completing your Welcome to Medicare Visit or Medicare Annual Wellness Visit today  Your next wellness visit will be due in one year (4/6/2023)  The screening/preventive services that you may require over the next 5-10 years are detailed below  Some tests may not apply to you based off risk factors and/or age  Screening tests ordered at today's visit but not completed yet may show as past due  Also, please note that scanned in results may not display below  Preventive Screenings:  Service Recommendations Previous Testing/Comments   Colorectal Cancer Screening  * Colonoscopy    * Fecal Occult Blood Test (FOBT)/Fecal Immunochemical Test (FIT)  * Fecal DNA/Cologuard Test  * Flexible Sigmoidoscopy Age: 54-65 years old   Colonoscopy: every 10 years (may be performed more frequently if at higher risk)  OR  FOBT/FIT: every 1 year  OR  Cologuard: every 3 years  OR  Sigmoidoscopy: every 5 years  Screening may be recommended earlier than age 48 if at higher risk for colorectal cancer  Also, an individualized decision between you and your healthcare provider will decide whether screening between the ages of 74-80 would be appropriate  Colonoscopy: Not on file  FOBT/FIT: Not on file  Cologuard: Not on file  Sigmoidoscopy: Not on file          Breast Cancer Screening Age: 36 years old  Frequency: every 1-2 years  Not required if history of left and right mastectomy Mammogram: 02/07/2022    Screening Current   Cervical Cancer Screening Between the ages of 21-29, pap smear recommended once every 3 years  Between the ages of 33-67, can perform pap smear with HPV co-testing every 5 years     Recommendations may differ for women with a history of total hysterectomy, cervical cancer, or abnormal pap smears in past  Pap Smear: 01/26/2021    Screening Not Indicated   Hepatitis C Screening Once for adults born between 1945 and 1965  More frequently in patients at high risk for Hepatitis C Hep C Antibody: Not on file    Screening Current   Diabetes Screening 1-2 times per year if you're at risk for diabetes or have pre-diabetes Fasting glucose: 98 mg/dL   A1C: 5 4 %    Screening Current   Cholesterol Screening Once every 5 years if you don't have a lipid disorder  May order more often based on risk factors  Lipid panel: 09/29/2021    Screening Current     Other Preventive Screenings Covered by Medicare:  1  Abdominal Aortic Aneurysm (AAA) Screening: covered once if your at risk  You're considered to be at risk if you have a family history of AAA  2  Lung Cancer Screening: covers low dose CT scan once per year if you meet all of the following conditions: (1) Age 50-69; (2) No signs or symptoms of lung cancer; (3) Current smoker or have quit smoking within the last 15 years; (4) You have a tobacco smoking history of at least 30 pack years (packs per day multiplied by number of years you smoked); (5) You get a written order from a healthcare provider  3  Glaucoma Screening: covered annually if you're considered high risk: (1) You have diabetes OR (2) Family history of glaucoma OR (3)  aged 48 and older OR (3)  American aged 72 and older  3  Osteoporosis Screening: covered every 2 years if you meet one of the following conditions: (1) You're estrogen deficient and at risk for osteoporosis based off medical history and other findings; (2) Have a vertebral abnormality; (3) On glucocorticoid therapy for more than 3 months; (4) Have primary hyperparathyroidism; (5) On osteoporosis medications and need to assess response to drug therapy  · Last bone density test (DXA Scan): 02/03/2021   5  HIV Screening: covered annually if you're between the age of 15-65  Also covered annually if you are younger than 13 and older than 72 with risk factors for HIV infection  For pregnant patients, it is covered up to 3 times per pregnancy      Immunizations:  Immunization Recommendations   Influenza Vaccine Annual influenza vaccination during flu season is recommended for all persons aged >= 6 months who do not have contraindications   Pneumococcal Vaccine (Prevnar and Pneumovax)  * Prevnar = PCV13  * Pneumovax = PPSV23   Adults 25-60 years old: 1-3 doses may be recommended based on certain risk factors  Adults 72 years old: Prevnar (PCV13) vaccine recommended followed by Pneumovax (PPSV23) vaccine  If already received PPSV23 since turning 65, then PCV13 recommended at least one year after PPSV23 dose  Hepatitis B Vaccine 3 dose series if at intermediate or high risk (ex: diabetes, end stage renal disease, liver disease)   Tetanus (Td) Vaccine - COST NOT COVERED BY MEDICARE PART B Following completion of primary series, a booster dose should be given every 10 years to maintain immunity against tetanus  Td may also be given as tetanus wound prophylaxis  Tdap Vaccine - COST NOT COVERED BY MEDICARE PART B Recommended at least once for all adults  For pregnant patients, recommended with each pregnancy  Shingles Vaccine (Shingrix) - COST NOT COVERED BY MEDICARE PART B  2 shot series recommended in those aged 48 and above     Health Maintenance Due:      Topic Date Due    Hepatitis C Screening  11/04/2023 (Originally 1940)    Cervical Cancer Screening  01/26/2023    DXA SCAN  02/03/2023    Breast Cancer Screening: Mammogram  02/07/2023     Immunizations Due:      Topic Date Due    DTaP,Tdap,and Td Vaccines (1 - Tdap) Never done    Pneumococcal Vaccine: 65+ Years (1 of 1 - PPSV23) Never done     Advance Directives   What are advance directives? Advance directives are legal documents that state your wishes and plans for medical care  These plans are made ahead of time in case you lose your ability to make decisions for yourself  Advance directives can apply to any medical decision, such as the treatments you want, and if you want to donate organs     What are the types of advance directives? There are many types of advance directives, and each state has rules about how to use them  You may choose a combination of any of the following:  · Living will: This is a written record of the treatment you want  You can also choose which treatments you do not want, which to limit, and which to stop at a certain time  This includes surgery, medicine, IV fluid, and tube feedings  · Durable power of  for healthcare [facility] [place]): This is a written record that states who you want to make healthcare choices for you when you are unable to make them for yourself  This person, called a proxy, is usually a family member or a friend  You may choose more than 1 proxy  · Do not resuscitate (DNR) order:  A DNR order is used in case your heart stops beating or you stop breathing  It is a request not to have certain forms of treatment, such as CPR  A DNR order may be included in other types of advance directives  · Medical directive: This covers the care that you want if you are in a coma, near death, or unable to make decisions for yourself  You can list the treatments you want for each condition  Treatment may include pain medicine, surgery, blood transfusions, dialysis, IV or tube feedings, and a ventilator (breathing machine)  · Values history: This document has questions about your views, beliefs, and how you feel and think about life  This information can help others choose the care that you would choose  Why are advance directives important? An advance directive helps you control your care  Although spoken wishes may be used, it is better to have your wishes written down  Spoken wishes can be misunderstood, or not followed  Treatments may be given even if you do not want them  An advance directive may make it easier for your family to make difficult choices about your care  Urinary Incontinence   Urinary incontinence (UI)  is when you lose control of your bladder   UI develops because your bladder cannot store or empty urine properly  The 3 most common types of UI are stress incontinence, urge incontinence, or both  Medicines:   · May be given to help strengthen your bladder control  Report any side effects of medication to your healthcare provider  Do pelvic muscle exercises often:  Your pelvic muscles help you stop urinating  Squeeze these muscles tight for 5 seconds, then relax for 5 seconds  Gradually work up to squeezing for 10 seconds  Do 3 sets of 15 repetitions a day, or as directed  This will help strengthen your pelvic muscles and improve bladder control  Train your bladder:  Go to the bathroom at set times, such as every 2 hours, even if you do not feel the urge to go  You can also try to hold your urine when you feel the urge to go  For example, hold your urine for 5 minutes when you feel the urge to go  As that becomes easier, hold your urine for 10 minutes  Self-care:   · Keep a UI record  Write down how often you leak urine and how much you leak  Make a note of what you were doing when you leaked urine  · Drink liquids as directed  You may need to limit the amount of liquid you drink to help control your urine leakage  Do not drink any liquid right before you go to bed  Limit or do not have drinks that contain caffeine or alcohol  · Prevent constipation  Eat a variety of high-fiber foods  Good examples are high-fiber cereals, beans, vegetables, and whole-grain breads  Walking is the best way to trigger your intestines to have a bowel movement  · Exercise regularly and maintain a healthy weight  Weight loss and exercise will decrease pressure on your bladder and help you control your leakage  · Use a catheter as directed  to help empty your bladder  A catheter is a tiny, plastic tube that is put into your bladder to drain your urine  · Go to behavior therapy as directed  Behavior therapy may be used to help you learn to control your urge to urinate      Alcohol Use and Your Health    Drinking too much can harm your health  Excessive alcohol use leads to about 88,000 death in the [place] each year, and shortens the life of those who diet by almost 30 years  Further, excessive drinking cost the economy $249 billion in 2010  Most excessive drinkers are not alcohol dependent  Excessive alcohol use has immediate effects that increase the risk of many harmful health conditions  These are most often the result of binge drinking  Over time, excessive alcohol use can lead to the development of chronic diseases and other series health problems  What is considered a "drink"? Excessive alcohol use includes:  · Binge Drinking: For women, 4 or more drinks consumed on one occasion  For men, 5 or more drinks consumed on one occasion  · Heavy Drinking: For women, 8 or more drinks per week  For men, 15 or more drinks per week  · Any alcohol used by pregnant women  · Any alcohol used by those under the age of 21 years    If you choose to drink, do so in moderation:  · Do not drink at all if you are under the age of 24, or if you are or may be pregnant, or have health problems that could be made worse by drinking    · For women, up to 1 drink per day  · For men, up to 2 drinks a day    No one should begin drinking or drink more frequently based on potential health benefits    Short-Term Health Risks:  · Injuries: motor vehicle crashes, falls, drownings, burns  · Violence: homicide, suicide, sexual assault, intimate partner violence  · Alcohol poisoning  · Reproductive health: risky sexual behaviors, unintended prengnacy, sexually transmitted diseases, miscarriage, stillbirth, fetal alcohol syndrome    Long-Term Health Risks:  · Chronic diseases: high blood pressure, heart disease, stroke, liver disease, digestive problems  · Cancers: breast, mouth and throat, liver, colon  · Learning and memory problems: dementia, poor school performance  · Mental health: depression, anxiety, insomnia  · Social problems: lost productivity, family problems, unemployment  · Alcohol dependence    For support and more information:  · Substance Abuse and [address] [place]  Web Address: https://[employer]/    · Alcoholics Anonymous        Web Address: http://[place]/    https://www cdc gov/alcohol/fact-sheets/alcohol-use htm     © [facility] 2018 Information is for End User's use only and may not be sold, redistributed or otherwise used for commercial purposes   All illustrations and images included in CareNotes® are the copyrighted property of A D A M , Inc  or [address] [place] [address]

## 2022-04-13 NOTE — TELEPHONE ENCOUNTER
Pt is aware but states due to Medicare Guidelines she can only be seen every 2 years for full coverage

## 2022-06-06 ENCOUNTER — APPOINTMENT (OUTPATIENT)
Dept: LAB | Facility: CLINIC | Age: 82
End: 2022-06-06
Payer: COMMERCIAL

## 2022-06-06 DIAGNOSIS — Z96.611 PRESENCE OF RIGHT ARTIFICIAL SHOULDER JOINT: ICD-10-CM

## 2022-06-06 LAB
CRP SERPL QL: 3.3 MG/L
ERYTHROCYTE [SEDIMENTATION RATE] IN BLOOD: 7 MM/HOUR (ref 0–29)

## 2022-06-06 PROCEDURE — 85652 RBC SED RATE AUTOMATED: CPT

## 2022-06-06 PROCEDURE — 86140 C-REACTIVE PROTEIN: CPT

## 2022-07-12 DIAGNOSIS — N95.2 VAGINAL ATROPHY: ICD-10-CM

## 2022-07-12 RX ORDER — ESTRADIOL 0.1 MG/G
1 CREAM VAGINAL 2 TIMES WEEKLY
Qty: 42.5 G | Refills: 0 | Status: SHIPPED | OUTPATIENT
Start: 2022-07-14 | End: 2022-10-12

## 2022-08-10 ENCOUNTER — TELEPHONE (OUTPATIENT)
Dept: FAMILY MEDICINE CLINIC | Facility: CLINIC | Age: 82
End: 2022-08-10

## 2022-08-10 NOTE — TELEPHONE ENCOUNTER
Patient called to inform you she is having shoulder replacement 9/7 at [facility] will have all apts with them

## 2022-09-02 ENCOUNTER — TELEPHONE (OUTPATIENT)
Dept: FAMILY MEDICINE CLINIC | Facility: CLINIC | Age: 82
End: 2022-09-02

## 2022-09-02 ENCOUNTER — TRANSCRIBE ORDERS (OUTPATIENT)
Dept: LAB | Facility: HOSPITAL | Age: 82
End: 2022-09-02

## 2022-09-02 ENCOUNTER — APPOINTMENT (OUTPATIENT)
Dept: PREADMISSION TESTING | Facility: HOSPITAL | Age: 82
DRG: 483 | End: 2022-09-02
Attending: ORTHOPAEDIC SURGERY
Payer: COMMERCIAL

## 2022-09-02 ENCOUNTER — HOSPITAL ENCOUNTER (OUTPATIENT)
Dept: CARDIOLOGY | Facility: HOSPITAL | Age: 82
Discharge: HOME | DRG: 483 | End: 2022-09-02
Attending: ORTHOPAEDIC SURGERY
Payer: COMMERCIAL

## 2022-09-02 VITALS
SYSTOLIC BLOOD PRESSURE: 180 MMHG | HEART RATE: 78 BPM | RESPIRATION RATE: 16 BRPM | TEMPERATURE: 97.5 F | HEIGHT: 63 IN | BODY MASS INDEX: 20.55 KG/M2 | OXYGEN SATURATION: 99 % | DIASTOLIC BLOOD PRESSURE: 90 MMHG | WEIGHT: 116 LBS

## 2022-09-02 DIAGNOSIS — Z11.59 ENCOUNTER FOR SCREENING FOR OTHER VIRAL DISEASES: ICD-10-CM

## 2022-09-02 DIAGNOSIS — E87.1 HYPONATREMIA: Primary | ICD-10-CM

## 2022-09-02 DIAGNOSIS — E87.1 HYPONATREMIA: ICD-10-CM

## 2022-09-02 DIAGNOSIS — Z01.818 ENCOUNTER FOR OTHER PREPROCEDURAL EXAMINATION: ICD-10-CM

## 2022-09-02 DIAGNOSIS — I10 HYPERTENSION, UNSPECIFIED TYPE: ICD-10-CM

## 2022-09-02 DIAGNOSIS — Z01.818 ENCOUNTER FOR OTHER PREPROCEDURAL EXAMINATION: Primary | ICD-10-CM

## 2022-09-02 DIAGNOSIS — T84.038D MECHANICAL LOOSENING OF OTHER INTERNAL PROSTHETIC JOINT, SUBSEQUENT ENCOUNTER: ICD-10-CM

## 2022-09-02 DIAGNOSIS — T84.038D MECHANICAL LOOSENING OF OTHER INTERNAL PROSTHETIC JOINT, SUBSEQUENT ENCOUNTER: Primary | ICD-10-CM

## 2022-09-02 LAB
ABO + RH BLD: NORMAL
ALBUMIN SERPL-MCNC: 4.5 G/DL (ref 3.4–5)
ALP SERPL-CCNC: 67 IU/L (ref 35–126)
ALT SERPL-CCNC: 26 IU/L (ref 11–54)
ANION GAP SERPL CALC-SCNC: 10 MEQ/L (ref 3–15)
AST SERPL-CCNC: 30 IU/L (ref 15–41)
ATRIAL RATE: 80
BILIRUB SERPL-MCNC: 0.8 MG/DL (ref 0.3–1.2)
BLD GP AB SCN SERPL QL: NEGATIVE
BLOOD BANK CMNT PATIENT-IMP: NORMAL
BUN SERPL-MCNC: 16 MG/DL (ref 8–20)
CALCIUM SERPL-MCNC: 10.4 MG/DL (ref 8.9–10.3)
CHLORIDE SERPL-SCNC: 91 MEQ/L (ref 98–109)
CO2 SERPL-SCNC: 27 MEQ/L (ref 22–32)
CREAT SERPL-MCNC: 0.5 MG/DL (ref 0.6–1.1)
CRP SERPL-MCNC: <6 MG/L
D AG BLD QL: NEGATIVE
ERYTHROCYTE [DISTWIDTH] IN BLOOD BY AUTOMATED COUNT: 12.4 % (ref 11.7–14.4)
ERYTHROCYTE [SEDIMENTATION RATE] IN BLOOD BY WESTERGREN METHOD: 13 MM/HR
GFR SERPL CREATININE-BSD FRML MDRD: >60 ML/MIN/1.73M*2
GLUCOSE SERPL-MCNC: 101 MG/DL (ref 70–99)
HCT VFR BLDCO AUTO: 40.7 % (ref 35–45)
HGB BLD-MCNC: 13.9 G/DL (ref 11.8–15.7)
LABORATORY COMMENT REPORT: NORMAL
MCH RBC QN AUTO: 32.4 PG (ref 28–33.2)
MCHC RBC AUTO-ENTMCNC: 34.2 G/DL (ref 32.2–35.5)
MCV RBC AUTO: 94.9 FL (ref 83–98)
P AXIS: 52
PDW BLD AUTO: 9.3 FL (ref 9.4–12.3)
PLATELET # BLD AUTO: 309 K/UL (ref 150–369)
POTASSIUM SERPL-SCNC: 4.8 MEQ/L (ref 3.6–5.1)
PR INTERVAL: 132
PROT SERPL-MCNC: 6.6 G/DL (ref 6–8.2)
QRS DURATION: 114
QT INTERVAL: 414
QTC CALCULATION(BAZETT): 477
R AXIS: 11
RBC # BLD AUTO: 4.29 M/UL (ref 3.93–5.22)
SARS-COV-2 RNA RESP QL NAA+PROBE: NEGATIVE
SODIUM SERPL-SCNC: 128 MEQ/L (ref 136–144)
SPECIMEN EXP DATE BLD: NORMAL
T WAVE AXIS: 23
VENTRICULAR RATE: 80
WBC # BLD AUTO: 5.66 K/UL (ref 3.8–10.5)

## 2022-09-02 PROCEDURE — 86901 BLOOD TYPING SEROLOGIC RH(D): CPT

## 2022-09-02 PROCEDURE — 86140 C-REACTIVE PROTEIN: CPT

## 2022-09-02 PROCEDURE — 36415 COLL VENOUS BLD VENIPUNCTURE: CPT

## 2022-09-02 PROCEDURE — U0003 INFECTIOUS AGENT DETECTION BY NUCLEIC ACID (DNA OR RNA); SEVERE ACUTE RESPIRATORY SYNDROME CORONAVIRUS 2 (SARS-COV-2) (CORONAVIRUS DISEASE [COVID-19]), AMPLIFIED PROBE TECHNIQUE, MAKING USE OF HIGH THROUGHPUT TECHNOLOGIES AS DESCRIBED BY CMS-2020-01-R: HCPCS

## 2022-09-02 PROCEDURE — 93005 ELECTROCARDIOGRAM TRACING: CPT

## 2022-09-02 PROCEDURE — 85027 COMPLETE CBC AUTOMATED: CPT

## 2022-09-02 PROCEDURE — 80053 COMPREHEN METABOLIC PANEL: CPT

## 2022-09-02 PROCEDURE — 99203 OFFICE O/P NEW LOW 30 MIN: CPT | Performed by: HOSPITALIST

## 2022-09-02 PROCEDURE — 85652 RBC SED RATE AUTOMATED: CPT

## 2022-09-02 RX ORDER — ESTRADIOL 0.1 MG/G
CREAM VAGINAL
COMMUNITY
Start: 2022-07-14

## 2022-09-02 RX ORDER — LISINOPRIL 40 MG/1
40 TABLET ORAL DAILY
COMMUNITY

## 2022-09-02 NOTE — ASSESSMENT & PLAN NOTE
Reports good exercise capacity able to reach greater than 4 mets activity levels.  No cardiac history and patient exhibits no signs/symptoms of angina, arrythmia or decompenstated CHF.   no hx of TIA/CVA  ECG benign.  Preop labs within acceptable limits.    P:  Acceptable cardiovascular risk for this intermediate risk surgery.  Of note she is noted to have hyponatremia with a sodium level of 128 on preop labs today.  Discussed with patient and she had repeat blood work done through her PCP this past weekend upon my advice and she had indicated her to repeat sodium level was 132  Can proceed to surgery without additional testing.    Instructed patient to hold her estradiol cream atleast  7 days preop due to risk of thrombotic events

## 2022-09-02 NOTE — ASSESSMENT & PLAN NOTE
BP initially 180/90.  Repeat at the end of the visit 152/76  Patient reports she checks her blood pressure regularly at home and usually well controlled.  She was feeling a bit anxious and stressed out this morning.  Continue lisinopril -patient instructed to hold her med the morning of surgery.  Monitor blood pressures postop.  I instructed patient to continue checking her blood pressures at home and maintain a log.  If persistent elevations noted recommended she call her physician for further recommendations.

## 2022-09-02 NOTE — PRE-PROCEDURE INSTRUCTIONS
1. Admissions will call you with your arrival time on 9/6/22  (day prior to surgery) between 2pm-4pm.  For questions about your arrival time, please call [phone].     2. On the day of your procedure please report to the [facility] in the [place]. Please arrive through the [facility] Entrance ([address], [place], [place]).  If you are parking in the [facility] Parking [facility]age, come to the ground floor of the garage and follow signs to the [facility].  After being screened, please report to the Surgery Registration Desk located in the [facility] next to the [facility] on the ground floor.     3. Please follow the following fasting guidelines:  No solid food EIGHT HOURS prior to surgery. Unlimited clear liquids, meaning water or PLAIN black coffee WITHOUT any milk, cream, sugar, or sweetener are permitted up to TWO HOURS prior to arrival at the hospital.    4. Early on the morning of the procedure please take your usual dose of the listed medications with a sip of water: none                  5. Other Instructions: You may brush your teeth the morning of the procedure. Rinse and spit, do not swallow.  Bring a list of your medications with dosages.  Use surgical wash as directed.     6. If you develop a cold, cough, fever, rash, or other symptom prior to the day of the procedure, please report it to your physician immediately.    7. If you need to cancel the procedure for any reason, please contact your physician.    8. Make arrangements to have someone drive you home from the procedure. If you have not arranged for transportation home, your surgery may be cancelled.     9. You may not take public transportation unless accompanied by a responsible person.    10. You may not drive a car or operate complex or potentially dangerous machinery for 24 hours following anesthesia and/or sedation.    11.  If it is medically necessary for you to have a longer stay, you will be informed as  soon as the decision is made.    12. Only bring essential items to the hospital.  Do not wear or bring anything of value to the hospital including jewelry of any kind, money, or wallet. Do not wear make-up or contact lenses.  DO NOT BRING MEDICATIONS FROM HOME unless instructed to do so. DO bring your hearing aids, glasses, and a case    13. No lotion, creams, powders, or oils on skin the morning of procedure     14. Dress in comfortable clothes.    15.  If instructed, please bring a copy of your Advanced Directive (Living Will/Durable Power of ) on the day of your procedure.     16. Patients need to quarantine from the time of PAT COVID test to day of surgery, regardless of COVID vaccine status.      17. Ensuring your safety at all times is a very important part of our [facility] Culture of Safety. After having surgery and sedation, you are at risk for falling and balance issues. Although you may feel awake, the effects of the medication can last up to 24 hours after anesthesia. If you need to use the bathroom during your recovery period, nursing staff will escort you there and stay with you to ensure your safety.    18. Refrain from drinking alcohol and smoking cigarettes for 24 hours prior to surgery.    19. Shower with antibacterial soap (Dial) the night before and morning of your procedure.  If your procedure indicates the need for CHG antiseptic wash (Bactoshield or Hibiclens), please use this instead and follow instructions as discussed at the time of your Pre-Admission Testing phone interview or visit.        Above instructions reviewed with patient and patient acknowledges understanding.    Form explained by: Irma Hoyos RN

## 2022-09-02 NOTE — CONSULTS
[facility] Medicine Service -  Pre-Operative Consultation       Patient Name: Fernanda Cazares  Referring Surgeon: Dr. Wade Schulz    Reason for Referral: Pre-Operative Evaluation  Surgical Procedure: Right Revision To Reverse Total Shoulder Arthroplasty  Operative Date: 09/07/2022  Other Providers:      PCP: Lola Brown MD          HISTORY OF PRESENT ILLNESS      Fernanda Cazares is a 82 y.o. female presenting today to the [facility] at [facility] for pre-operative evaluation prior to planned surgery.    Complains of significant right shoulder pain, decreased range of motion due to loose hardware.    In regards to medical history:    The patient denies any current or recent chest pain or pressure, dyspnea, cough, sputum, fevers, chills, abdominal pain, nausea, vomiting, diarrhea or other symptoms. No urinary symptoms, no BRBPR or melena    Functionally, the patient is able to ascend a flight or so of stairs with no dyspnea or chest pain.     The patient denies, on specific questioning, the following:  No history of MI, arrhythmia,or CHF.  No history of MALU.  No history of DVT/PE.  No history of COPD.  No history of CVA.  No history of DM.   No history of CKD.     PAST MEDICAL AND SURGICAL HISTORY      Past Medical History:   Diagnosis Date    Hematuria     micro    Hypertension     Skin cancer     Thyroid nodule        Past Surgical History:   Procedure Laterality Date    APPENDECTOMY      CATARACT EXTRACTION, BILATERAL      COLONOSCOPY      JOINT REPLACEMENT Right     shoulder, right hip 2013       MEDICATIONS        Current Outpatient Medications:     jayesh/D3/mag11/zinc//paxton/bor (CALTRATE 600-D PLUS MINERALS ORAL), Take 1 tablet by mouth 2 (two) times a day., Disp: , Rfl:     estradioL (ESTRACE) 0.01 % (0.1 mg/gram) vaginal cream, estradiol 0.01% (0.1 mg/gram) vaginal cream  INSERT 1 G INTO THE VAGINA 2 (TWO) TIMES A WEEK, Disp: , Rfl:  "    lisinopriL (PRINIVIL) 40 mg tablet, Take 40 mg by mouth daily., Disp: , Rfl:     multivitamin tablet, Take 1 tablet by mouth daily., Disp: , Rfl:     propylene glycoL (SYSTANE BALANCE) 0.6 % drops, Administer 1 drop into affected eye(s) 2 times daily., Disp: , Rfl:     ALLERGIES      Patient has no known allergies.    FAMILY HISTORY      No pertinent family history    Denies any prior known family history of DVTs/PEs/clotting disorder    SOCIAL HISTORY      Social History     Tobacco Use    Smoking status: Former Smoker     Packs/day: 0.50     Years: 10.00     Pack years: 5.00     Types: Cigarettes     Quit date:      Years since quittin.6    Smokeless tobacco: Never Used   Substance Use Topics    Alcohol use: Yes     Alcohol/week: 4.0 standard drinks     Types: 4 Glasses of wine per week    Drug use: Never       REVIEW OF SYSTEMS      All other systems reviewed and negative except as noted in HPI    PHYSICAL EXAMINATION      Visit Vitals  BP (!) 180/90   Pulse 78   Temp 36.4 °C (97.5 °F) (Temporal)   Resp 16   Ht 1.6 m (5' 3\")   Wt 52.6 kg (116 lb)   SpO2 99%   Breastfeeding No   BMI 20.55 kg/m²     Body mass index is 20.55 kg/m².    Physical Exam  Constitutional:       Appearance: Normal appearance.   HENT:      Head: Normocephalic and atraumatic.   Eyes:      Conjunctiva/sclera: Conjunctivae normal.   Cardiovascular:      Rate and Rhythm: Normal rate and regular rhythm.      Heart sounds: Normal heart sounds.   Pulmonary:      Effort: Pulmonary effort is normal.      Breath sounds: Normal breath sounds.   Abdominal:      General: Bowel sounds are normal. There is no distension.      Palpations: Abdomen is soft.      Tenderness: There is no abdominal tenderness.   Musculoskeletal:      Cervical back: Neck supple.      Comments: Right shoulder limited ROM from pain   Skin:     General: Skin is warm and dry.   Neurological:      Mental Status: She is alert and oriented to person, place, and time. "   Psychiatric:         Mood and Affect: Mood normal.         Behavior: Behavior normal.         LABS / EKG        Labs  reviewed    Lab Results   Component Value Date     (L) 09/02/2022    K 4.8 09/02/2022    CL 91 (L) 09/02/2022    BUN 16 09/02/2022    CREATININE 0.5 (L) 09/02/2022    WBC 5.66 09/02/2022    HGB 13.9 09/02/2022    HCT 40.7 09/02/2022     09/02/2022    ALT 26 09/02/2022    AST 30 09/02/2022         ECG/Telemetry  ECG personally reviewed:   Normal sinus rhythm   Low voltage QRS   Incomplete right bundle branch block    ASSESSMENT AND PLAN         Encounter for other preprocedural examination  Reports good exercise capacity able to reach greater than 4 mets activity levels.  No cardiac history and patient exhibits no signs/symptoms of angina, arrythmia or decompenstated CHF.   no hx of TIA/CVA  ECG benign.  Preop labs within acceptable limits.    P:  Acceptable cardiovascular risk for this intermediate risk surgery.  Of note she is noted to have hyponatremia with a sodium level of 128 on preop labs today.  Discussed with patient and she had repeat blood work done through her PCP this past weekend upon my advice and she had indicated her to repeat sodium level was 132  Can proceed to surgery without additional testing.    Instructed patient to hold her estradiol cream atleast  7 days preop due to risk of thrombotic events    HTN (hypertension)  BP initially 180/90.  Repeat at the end of the visit 152/76  Patient reports she checks her blood pressure regularly at home and usually well controlled.  She was feeling a bit anxious and stressed out this morning.  Continue lisinopril -patient instructed to hold her med the morning of surgery.  Monitor blood pressures postop.  I instructed patient to continue checking her blood pressures at home and maintain a log.  If persistent elevations noted recommended she call her physician for further recommendations.    Hyponatremia  Na 128 today.  She  reported poor p.o. intake prior to lab work today  Pt with a hx of chronic hyponatremia, baseline appears to be in low 130s  Patient reported she was using NSAIDs but stopped it few days prior to this visit.  Recommended avoiding further NSAID use  Possibly may component of SIADH from pain.  Spoke with patient over phone and informed her of her low sodium levels and she did confirm that she has a history of hyponatremia with sodium in the low 130s at baseline.  She is denying any new symptoms from hyponatremia.  Did speak with patient at length about possible symptoms of hyponatremia worsening such as changes in mental status, dizziness or lightheadedness, unsteadiness, increased weakness/lethargy as this could be warning signs of worsening hyponatremia and if the symptoms to occur recommended she go to the ER immediately.  Recommended adequate p.o. intake, hydration.  At the same time instructed patient to avoid overhydration and she could consider drinking something like Gatorade.  Recommend repeat BMP the morning of surgery preop and ideally sodium level needs to be greater than 130 for anesthesia purposes    Addendum: patient had indicated that on her repeat blood work through her PCP this following weekend after visit sodium level 132.       In regards to perioperative cardiac risk:  The patient denies any history of ischemic heart disease, denies any history of CHF, denies any history of CVA, is not on pre-operative treatment with insulin, and does not have a pre-operative creatinine > 2 mg/dL.   The Revised Cardiac Risk Index (RCRI) for this patient indicates 0.4% risk.     Further comments:  Resume supplements when OK with surgical team.  I would encourage incentive spirometry to assist with minimizing jaimie-operative pulmonary risk.  DVT prophylaxis and timing of such per the discretion of the surgeon.     Please do not hesitate to contact [facility] during the upcoming hospitalization with any questions or  concerns.     French Wilson MD  9/6/2022

## 2022-09-02 NOTE — H&P (VIEW-ONLY)
[facility] Medicine Service -  Pre-Operative Consultation       Patient Name: Fernanda Cazares  Referring Surgeon: Dr. Wade Schulz    Reason for Referral: Pre-Operative Evaluation  Surgical Procedure: Right Revision To Reverse Total Shoulder Arthroplasty  Operative Date: 09/07/2022  Other Providers:      PCP: Lola Brown MD          HISTORY OF PRESENT ILLNESS      Fernanda Cazares is a 82 y.o. female presenting today to the [facility] at [facility] for pre-operative evaluation prior to planned surgery.    Complains of significant right shoulder pain, decreased range of motion due to loose hardware.    In regards to medical history:    The patient denies any current or recent chest pain or pressure, dyspnea, cough, sputum, fevers, chills, abdominal pain, nausea, vomiting, diarrhea or other symptoms. No urinary symptoms, no BRBPR or melena    Functionally, the patient is able to ascend a flight or so of stairs with no dyspnea or chest pain.     The patient denies, on specific questioning, the following:  No history of MI, arrhythmia,or CHF.  No history of MALU.  No history of DVT/PE.  No history of COPD.  No history of CVA.  No history of DM.   No history of CKD.     PAST MEDICAL AND SURGICAL HISTORY      Past Medical History:   Diagnosis Date    Hematuria     micro    Hypertension     Skin cancer     Thyroid nodule        Past Surgical History:   Procedure Laterality Date    APPENDECTOMY      CATARACT EXTRACTION, BILATERAL      COLONOSCOPY      JOINT REPLACEMENT Right     shoulder, right hip 2013       MEDICATIONS        Current Outpatient Medications:     jayesh/D3/mag11/zinc//paxton/bor (CALTRATE 600-D PLUS MINERALS ORAL), Take 1 tablet by mouth 2 (two) times a day., Disp: , Rfl:     estradioL (ESTRACE) 0.01 % (0.1 mg/gram) vaginal cream, estradiol 0.01% (0.1 mg/gram) vaginal cream  INSERT 1 G INTO THE VAGINA 2 (TWO) TIMES A WEEK, Disp: , Rfl:  "    lisinopriL (PRINIVIL) 40 mg tablet, Take 40 mg by mouth daily., Disp: , Rfl:     multivitamin tablet, Take 1 tablet by mouth daily., Disp: , Rfl:     propylene glycoL (SYSTANE BALANCE) 0.6 % drops, Administer 1 drop into affected eye(s) 2 times daily., Disp: , Rfl:     ALLERGIES      Patient has no known allergies.    FAMILY HISTORY      No pertinent family history    Denies any prior known family history of DVTs/PEs/clotting disorder    SOCIAL HISTORY      Social History     Tobacco Use    Smoking status: Former Smoker     Packs/day: 0.50     Years: 10.00     Pack years: 5.00     Types: Cigarettes     Quit date:      Years since quittin.6    Smokeless tobacco: Never Used   Substance Use Topics    Alcohol use: Yes     Alcohol/week: 4.0 standard drinks     Types: 4 Glasses of wine per week    Drug use: Never       REVIEW OF SYSTEMS      All other systems reviewed and negative except as noted in HPI    PHYSICAL EXAMINATION      Visit Vitals  BP (!) 180/90   Pulse 78   Temp 36.4 °C (97.5 °F) (Temporal)   Resp 16   Ht 1.6 m (5' 3\")   Wt 52.6 kg (116 lb)   SpO2 99%   Breastfeeding No   BMI 20.55 kg/m²     Body mass index is 20.55 kg/m².    Physical Exam  Constitutional:       Appearance: Normal appearance.   HENT:      Head: Normocephalic and atraumatic.   Eyes:      Conjunctiva/sclera: Conjunctivae normal.   Cardiovascular:      Rate and Rhythm: Normal rate and regular rhythm.      Heart sounds: Normal heart sounds.   Pulmonary:      Effort: Pulmonary effort is normal.      Breath sounds: Normal breath sounds.   Abdominal:      General: Bowel sounds are normal. There is no distension.      Palpations: Abdomen is soft.      Tenderness: There is no abdominal tenderness.   Musculoskeletal:      Cervical back: Neck supple.      Comments: Right shoulder limited ROM from pain   Skin:     General: Skin is warm and dry.   Neurological:      Mental Status: She is alert and oriented to person, place, and time. "   Psychiatric:         Mood and Affect: Mood normal.         Behavior: Behavior normal.         LABS / EKG        Labs  reviewed    Lab Results   Component Value Date     (L) 09/02/2022    K 4.8 09/02/2022    CL 91 (L) 09/02/2022    BUN 16 09/02/2022    CREATININE 0.5 (L) 09/02/2022    WBC 5.66 09/02/2022    HGB 13.9 09/02/2022    HCT 40.7 09/02/2022     09/02/2022    ALT 26 09/02/2022    AST 30 09/02/2022         ECG/Telemetry  ECG personally reviewed:   Normal sinus rhythm   Low voltage QRS   Incomplete right bundle branch block    ASSESSMENT AND PLAN         Encounter for other preprocedural examination  Reports good exercise capacity able to reach greater than 4 mets activity levels.  No cardiac history and patient exhibits no signs/symptoms of angina, arrythmia or decompenstated CHF.   no hx of TIA/CVA  ECG benign.  Preop labs within acceptable limits.    P:  Acceptable cardiovascular risk for this intermediate risk surgery.  Of note she is noted to have hyponatremia with a sodium level of 128 on preop labs today.  Discussed with patient and she had repeat blood work done through her PCP this past weekend upon my advice and she had indicated her to repeat sodium level was 132  Can proceed to surgery without additional testing.    Instructed patient to hold her estradiol cream atleast  7 days preop due to risk of thrombotic events    HTN (hypertension)  BP initially 180/90.  Repeat at the end of the visit 152/76  Patient reports she checks her blood pressure regularly at home and usually well controlled.  She was feeling a bit anxious and stressed out this morning.  Continue lisinopril -patient instructed to hold her med the morning of surgery.  Monitor blood pressures postop.  I instructed patient to continue checking her blood pressures at home and maintain a log.  If persistent elevations noted recommended she call her physician for further recommendations.    Hyponatremia  Na 128 today.  She  reported poor p.o. intake prior to lab work today  Pt with a hx of chronic hyponatremia, baseline appears to be in low 130s  Patient reported she was using NSAIDs but stopped it few days prior to this visit.  Recommended avoiding further NSAID use  Possibly may component of SIADH from pain.  Spoke with patient over phone and informed her of her low sodium levels and she did confirm that she has a history of hyponatremia with sodium in the low 130s at baseline.  She is denying any new symptoms from hyponatremia.  Did speak with patient at length about possible symptoms of hyponatremia worsening such as changes in mental status, dizziness or lightheadedness, unsteadiness, increased weakness/lethargy as this could be warning signs of worsening hyponatremia and if the symptoms to occur recommended she go to the ER immediately.  Recommended adequate p.o. intake, hydration.  At the same time instructed patient to avoid overhydration and she could consider drinking something like Gatorade.  Recommend repeat BMP the morning of surgery preop and ideally sodium level needs to be greater than 130 for anesthesia purposes    Addendum: patient had indicated that on her repeat blood work through her PCP this following weekend after visit sodium level 132.       In regards to perioperative cardiac risk:  The patient denies any history of ischemic heart disease, denies any history of CHF, denies any history of CVA, is not on pre-operative treatment with insulin, and does not have a pre-operative creatinine > 2 mg/dL.   The Revised Cardiac Risk Index (RCRI) for this patient indicates 0.4% risk.     Further comments:  Resume supplements when OK with surgical team.  I would encourage incentive spirometry to assist with minimizing jaimie-operative pulmonary risk.  DVT prophylaxis and timing of such per the discretion of the surgeon.     Please do not hesitate to contact [facility] during the upcoming hospitalization with any questions or  concerns.     French Wilson MD  9/6/2022

## 2022-09-02 NOTE — TELEPHONE ENCOUNTER
Patient is having shoulder surgery on Wednesday down in [facility]  The surgeon had her do some labs at her pre-op appointment and noticed her sodium levels are low  He wants her to have it rechecked on Saturday  She can't drive to [facility] to have labs done tomorrow, so she is wondering if you would please enter a lab order for her to go to [facility]  to have her sodium level checked tomorrow

## 2022-09-02 NOTE — ASSESSMENT & PLAN NOTE
Na 128 today.  She reported poor p.o. intake prior to lab work today  Pt with a hx of chronic hyponatremia, baseline appears to be in low 130s  Patient reported she was using NSAIDs but stopped it few days prior to this visit.  Recommended avoiding further NSAID use  Possibly may component of SIADH from pain.  Spoke with patient over phone and informed her of her low sodium levels and she did confirm that she has a history of hyponatremia with sodium in the low 130s at baseline.  She is denying any new symptoms from hyponatremia.  Did speak with patient at length about possible symptoms of hyponatremia worsening such as changes in mental status, dizziness or lightheadedness, unsteadiness, increased weakness/lethargy as this could be warning signs of worsening hyponatremia and if the symptoms to occur recommended she go to the ER immediately.  Recommended adequate p.o. intake, hydration.  At the same time instructed patient to avoid overhydration and she could consider drinking something like Gatorade.  Recommend repeat BMP the morning of surgery preop and ideally sodium level needs to be greater than 130 for anesthesia purposes    Addendum: patient had indicated that on her repeat blood work through her PCP this following weekend after visit sodium level 132.

## 2022-09-03 ENCOUNTER — APPOINTMENT (OUTPATIENT)
Dept: LAB | Facility: HOSPITAL | Age: 82
End: 2022-09-03
Payer: COMMERCIAL

## 2022-09-03 DIAGNOSIS — E87.1 HYPONATREMIA: ICD-10-CM

## 2022-09-03 LAB
ANION GAP SERPL CALCULATED.3IONS-SCNC: 6 MMOL/L (ref 4–13)
BUN SERPL-MCNC: 22 MG/DL (ref 5–25)
CALCIUM SERPL-MCNC: 9.3 MG/DL (ref 8.3–10.1)
CHLORIDE SERPL-SCNC: 95 MMOL/L (ref 96–108)
CO2 SERPL-SCNC: 31 MMOL/L (ref 21–32)
CREAT SERPL-MCNC: 0.72 MG/DL (ref 0.6–1.3)
GFR SERPL CREATININE-BSD FRML MDRD: 79 ML/MIN/1.73SQ M
GLUCOSE SERPL-MCNC: 101 MG/DL (ref 65–140)
POTASSIUM SERPL-SCNC: 4.5 MMOL/L (ref 3.5–5.3)
SODIUM SERPL-SCNC: 132 MMOL/L (ref 135–147)

## 2022-09-03 PROCEDURE — 80048 BASIC METABOLIC PNL TOTAL CA: CPT

## 2022-09-03 PROCEDURE — 36415 COLL VENOUS BLD VENIPUNCTURE: CPT

## 2022-09-06 ENCOUNTER — ANESTHESIA EVENT (OUTPATIENT)
Dept: OPERATING ROOM | Facility: HOSPITAL | Age: 82
Setting detail: SURGERY ADMIT
DRG: 483 | End: 2022-09-06
Payer: COMMERCIAL

## 2022-09-07 ENCOUNTER — APPOINTMENT (OUTPATIENT)
Dept: RADIOLOGY | Facility: HOSPITAL | Age: 82
Setting detail: SURGERY ADMIT
DRG: 483 | End: 2022-09-07
Attending: ORTHOPAEDIC SURGERY
Payer: COMMERCIAL

## 2022-09-07 ENCOUNTER — ANESTHESIA (OUTPATIENT)
Dept: OPERATING ROOM | Facility: HOSPITAL | Age: 82
Setting detail: SURGERY ADMIT
DRG: 483 | End: 2022-09-07
Payer: COMMERCIAL

## 2022-09-07 ENCOUNTER — HOSPITAL ENCOUNTER (INPATIENT)
Facility: HOSPITAL | Age: 82
LOS: 1 days | Discharge: HOME | DRG: 483 | End: 2022-09-08
Attending: ORTHOPAEDIC SURGERY | Admitting: ORTHOPAEDIC SURGERY
Payer: COMMERCIAL

## 2022-09-07 ENCOUNTER — APPOINTMENT (OUTPATIENT)
Dept: RADIOLOGY | Facility: HOSPITAL | Age: 82
Setting detail: SURGERY ADMIT
DRG: 483 | End: 2022-09-07
Attending: STUDENT IN AN ORGANIZED HEALTH CARE EDUCATION/TRAINING PROGRAM
Payer: COMMERCIAL

## 2022-09-07 DIAGNOSIS — Z96.619 LOOSENING OF SHOULDER JOINT PROSTHESIS, SUBSEQUENT ENCOUNTER: ICD-10-CM

## 2022-09-07 DIAGNOSIS — T84.038D LOOSENING OF SHOULDER JOINT PROSTHESIS, SUBSEQUENT ENCOUNTER: ICD-10-CM

## 2022-09-07 PROBLEM — Z98.890 S/P SHOULDER SURGERY: Status: ACTIVE | Noted: 2022-09-07

## 2022-09-07 LAB
ABO + RH BLD: NORMAL
APPEARANCE SNV: ABNORMAL
BODY FLD TYPE: ABNORMAL
COLOR SNV: ABNORMAL
D AG BLD QL: NEGATIVE
LABORATORY COMMENT REPORT: NORMAL
LYMPHOCYTES NFR FLD MANUAL: 54 %
MONOS+MACROS NFR FLD MANUAL: 7 %
NEUTROPHILS NFR FLD MANUAL: 39 %
RBC # SNV: ABNORMAL CELLS/CU MM (ref 0–10000)
SPECIMEN SOURCE: ABNORMAL
WBC # SNV AUTO: 578 CELLS/CU MM (ref 0–200)

## 2022-09-07 PROCEDURE — 63600000 HC DRUGS/DETAIL CODE: Mod: JW | Performed by: NURSE ANESTHETIST, CERTIFIED REGISTERED

## 2022-09-07 PROCEDURE — 12000000 HC ROOM AND CARE MED/SURG

## 2022-09-07 PROCEDURE — 27800000 HC SUPPLY/IMPLANTS: Performed by: ORTHOPAEDIC SURGERY

## 2022-09-07 PROCEDURE — 87075 CULTR BACTERIA EXCEPT BLOOD: CPT | Performed by: ORTHOPAEDIC SURGERY

## 2022-09-07 PROCEDURE — 25800000 HC PHARMACY IV SOLUTIONS: Performed by: ORTHOPAEDIC SURGERY

## 2022-09-07 PROCEDURE — 73020 X-RAY EXAM OF SHOULDER: CPT | Mod: RT

## 2022-09-07 PROCEDURE — C1776 JOINT DEVICE (IMPLANTABLE): HCPCS | Performed by: ORTHOPAEDIC SURGERY

## 2022-09-07 PROCEDURE — 63600000 HC DRUGS/DETAIL CODE: Performed by: STUDENT IN AN ORGANIZED HEALTH CARE EDUCATION/TRAINING PROGRAM

## 2022-09-07 PROCEDURE — 87070 CULTURE OTHR SPECIMN AEROBIC: CPT | Performed by: ORTHOPAEDIC SURGERY

## 2022-09-07 PROCEDURE — 87206 SMEAR FLUORESCENT/ACID STAI: CPT | Performed by: ORTHOPAEDIC SURGERY

## 2022-09-07 PROCEDURE — 87205 SMEAR GRAM STAIN: CPT | Performed by: ORTHOPAEDIC SURGERY

## 2022-09-07 PROCEDURE — 36000015 HC OR LEVEL 5 EA ADDL MIN: Performed by: ORTHOPAEDIC SURGERY

## 2022-09-07 PROCEDURE — 0RPJ0JZ REMOVAL OF SYNTHETIC SUBSTITUTE FROM RIGHT SHOULDER JOINT, OPEN APPROACH: ICD-10-PCS | Performed by: ORTHOPAEDIC SURGERY

## 2022-09-07 PROCEDURE — 99232 SBSQ HOSP IP/OBS MODERATE 35: CPT | Performed by: HOSPITALIST

## 2022-09-07 PROCEDURE — 63700000 HC SELF-ADMINISTRABLE DRUG

## 2022-09-07 PROCEDURE — 25000000 HC PHARMACY GENERAL: Performed by: NURSE ANESTHETIST, CERTIFIED REGISTERED

## 2022-09-07 PROCEDURE — 87116 MYCOBACTERIA CULTURE: CPT | Performed by: ORTHOPAEDIC SURGERY

## 2022-09-07 PROCEDURE — C1713 ANCHOR/SCREW BN/BN,TIS/BN: HCPCS | Performed by: ORTHOPAEDIC SURGERY

## 2022-09-07 PROCEDURE — 36103150 FL FLUOROSCOPY TECHNICAL ASSISTANCE

## 2022-09-07 PROCEDURE — 63600000 HC DRUGS/DETAIL CODE: Performed by: NURSE PRACTITIONER

## 2022-09-07 PROCEDURE — 25800000 HC PHARMACY IV SOLUTIONS: Performed by: NURSE ANESTHETIST, CERTIFIED REGISTERED

## 2022-09-07 PROCEDURE — 0RRJ00Z REPLACEMENT OF RIGHT SHOULDER JOINT WITH REVERSE BALL AND SOCKET SYNTHETIC SUBSTITUTE, OPEN APPROACH: ICD-10-PCS | Performed by: ORTHOPAEDIC SURGERY

## 2022-09-07 PROCEDURE — 71000001 HC PACU PHASE 1 INITIAL 30MIN: Performed by: ORTHOPAEDIC SURGERY

## 2022-09-07 PROCEDURE — 87102 FUNGUS ISOLATION CULTURE: CPT | Performed by: ORTHOPAEDIC SURGERY

## 2022-09-07 PROCEDURE — 89060 EXAM SYNOVIAL FLUID CRYSTALS: CPT | Performed by: ORTHOPAEDIC SURGERY

## 2022-09-07 PROCEDURE — 89051 BODY FLUID CELL COUNT: CPT | Performed by: ORTHOPAEDIC SURGERY

## 2022-09-07 PROCEDURE — 37000001 HC ANESTHESIA GENERAL: Performed by: ORTHOPAEDIC SURGERY

## 2022-09-07 PROCEDURE — 71000011 HC PACU PHASE 1 EA ADDL MIN: Performed by: ORTHOPAEDIC SURGERY

## 2022-09-07 PROCEDURE — 36415 COLL VENOUS BLD VENIPUNCTURE: CPT | Performed by: ORTHOPAEDIC SURGERY

## 2022-09-07 PROCEDURE — 36000005 HC OR LEVEL 5 INITIAL 30MIN: Performed by: ORTHOPAEDIC SURGERY

## 2022-09-07 PROCEDURE — 27200000 HC STERILE SUPPLY: Performed by: ORTHOPAEDIC SURGERY

## 2022-09-07 PROCEDURE — 25800000 HC PHARMACY IV SOLUTIONS: Performed by: NURSE PRACTITIONER

## 2022-09-07 PROCEDURE — 63700000 HC SELF-ADMINISTRABLE DRUG: Performed by: NURSE PRACTITIONER

## 2022-09-07 PROCEDURE — 73030 X-RAY EXAM OF SHOULDER: CPT | Mod: RT

## 2022-09-07 DEVICE — IMPLANTABLE DEVICE: Type: IMPLANTABLE DEVICE | Site: SHOULDER | Status: FUNCTIONAL

## 2022-09-07 DEVICE — RSP GLENOID HEAD W/ RETAINING SCREW 4MM SZ 32MM: Type: IMPLANTABLE DEVICE | Site: SHOULDER | Status: FUNCTIONAL

## 2022-09-07 DEVICE — GLENOID BASEPLATE 30MM RSP: Type: IMPLANTABLE DEVICE | Site: SHOULDER | Status: FUNCTIONAL

## 2022-09-07 DEVICE — SCREW 5.0 X 26MM RSP LOCKING: Type: IMPLANTABLE DEVICE | Site: SHOULDER | Status: FUNCTIONAL

## 2022-09-07 RX ORDER — POLYETHYLENE GLYCOL 3350 17 G/17G
17 POWDER, FOR SOLUTION ORAL DAILY
Status: DISCONTINUED | OUTPATIENT
Start: 2022-09-07 | End: 2022-09-08 | Stop reason: HOSPADM

## 2022-09-07 RX ORDER — DEXTROSE 40 %
15-30 GEL (GRAM) ORAL AS NEEDED
Status: DISCONTINUED | OUTPATIENT
Start: 2022-09-07 | End: 2022-09-07 | Stop reason: HOSPADM

## 2022-09-07 RX ORDER — DEXTROSE 40 %
15-30 GEL (GRAM) ORAL AS NEEDED
Status: DISCONTINUED | OUTPATIENT
Start: 2022-09-07 | End: 2022-09-08 | Stop reason: HOSPADM

## 2022-09-07 RX ORDER — BISACODYL 10 MG/1
10 SUPPOSITORY RECTAL DAILY PRN
Status: DISCONTINUED | OUTPATIENT
Start: 2022-09-07 | End: 2022-09-08 | Stop reason: HOSPADM

## 2022-09-07 RX ORDER — AMOXICILLIN 250 MG
1 CAPSULE ORAL 2 TIMES DAILY
Status: DISCONTINUED | OUTPATIENT
Start: 2022-09-07 | End: 2022-09-08 | Stop reason: HOSPADM

## 2022-09-07 RX ORDER — NAPROXEN SODIUM 220 MG/1
81 TABLET, FILM COATED ORAL DAILY
Status: DISCONTINUED | OUTPATIENT
Start: 2022-09-08 | End: 2022-09-08 | Stop reason: HOSPADM

## 2022-09-07 RX ORDER — PHENYLEPHRINE HYDROCHLORIDE 10 MG/ML
INJECTION INTRAVENOUS AS NEEDED
Status: DISCONTINUED | OUTPATIENT
Start: 2022-09-07 | End: 2022-09-07 | Stop reason: SURG

## 2022-09-07 RX ORDER — ACETAMINOPHEN 325 MG/1
650 TABLET ORAL EVERY 4 HOURS PRN
Status: DISCONTINUED | OUTPATIENT
Start: 2022-09-07 | End: 2022-09-08 | Stop reason: HOSPADM

## 2022-09-07 RX ORDER — MIDAZOLAM HYDROCHLORIDE 2 MG/2ML
INJECTION, SOLUTION INTRAMUSCULAR; INTRAVENOUS AS NEEDED
Status: DISCONTINUED | OUTPATIENT
Start: 2022-09-07 | End: 2022-09-07 | Stop reason: SURG

## 2022-09-07 RX ORDER — ROCURONIUM BROMIDE 10 MG/ML
INJECTION, SOLUTION INTRAVENOUS AS NEEDED
Status: DISCONTINUED | OUTPATIENT
Start: 2022-09-07 | End: 2022-09-07 | Stop reason: SURG

## 2022-09-07 RX ORDER — IBUPROFEN 200 MG
16-32 TABLET ORAL AS NEEDED
Status: DISCONTINUED | OUTPATIENT
Start: 2022-09-07 | End: 2022-09-07 | Stop reason: HOSPADM

## 2022-09-07 RX ORDER — PROPOFOL 10 MG/ML
INJECTION, EMULSION INTRAVENOUS AS NEEDED
Status: DISCONTINUED | OUTPATIENT
Start: 2022-09-07 | End: 2022-09-07 | Stop reason: SURG

## 2022-09-07 RX ORDER — FENTANYL CITRATE 50 UG/ML
INJECTION, SOLUTION INTRAMUSCULAR; INTRAVENOUS AS NEEDED
Status: DISCONTINUED | OUTPATIENT
Start: 2022-09-07 | End: 2022-09-07 | Stop reason: SURG

## 2022-09-07 RX ORDER — HYDROMORPHONE HYDROCHLORIDE 1 MG/ML
0.5 INJECTION, SOLUTION INTRAMUSCULAR; INTRAVENOUS; SUBCUTANEOUS
Status: DISCONTINUED | OUTPATIENT
Start: 2022-09-07 | End: 2022-09-07 | Stop reason: HOSPADM

## 2022-09-07 RX ORDER — SODIUM CHLORIDE, SODIUM GLUCONATE, SODIUM ACETATE, POTASSIUM CHLORIDE AND MAGNESIUM CHLORIDE 30; 37; 368; 526; 502 MG/100ML; MG/100ML; MG/100ML; MG/100ML; MG/100ML
INJECTION, SOLUTION INTRAVENOUS CONTINUOUS PRN
Status: DISCONTINUED | OUTPATIENT
Start: 2022-09-07 | End: 2022-09-07 | Stop reason: SURG

## 2022-09-07 RX ORDER — DEXTROSE 50 % IN WATER (D50W) INTRAVENOUS SYRINGE
25 AS NEEDED
Status: DISCONTINUED | OUTPATIENT
Start: 2022-09-07 | End: 2022-09-07 | Stop reason: HOSPADM

## 2022-09-07 RX ORDER — ONDANSETRON HYDROCHLORIDE 2 MG/ML
4 INJECTION, SOLUTION INTRAVENOUS EVERY 8 HOURS PRN
Status: DISCONTINUED | OUTPATIENT
Start: 2022-09-07 | End: 2022-09-08 | Stop reason: HOSPADM

## 2022-09-07 RX ORDER — ONDANSETRON HYDROCHLORIDE 2 MG/ML
INJECTION, SOLUTION INTRAVENOUS AS NEEDED
Status: DISCONTINUED | OUTPATIENT
Start: 2022-09-07 | End: 2022-09-07 | Stop reason: SURG

## 2022-09-07 RX ORDER — CEFAZOLIN SODIUM 2 G/100ML
INJECTION, SOLUTION INTRAVENOUS AS NEEDED
Status: DISCONTINUED | OUTPATIENT
Start: 2022-09-07 | End: 2022-09-07 | Stop reason: SURG

## 2022-09-07 RX ORDER — ONDANSETRON HYDROCHLORIDE 2 MG/ML
4 INJECTION, SOLUTION INTRAVENOUS
Status: DISCONTINUED | OUTPATIENT
Start: 2022-09-07 | End: 2022-09-07 | Stop reason: HOSPADM

## 2022-09-07 RX ORDER — OXYCODONE HYDROCHLORIDE 5 MG/1
5-10 TABLET ORAL EVERY 4 HOURS PRN
Status: DISCONTINUED | OUTPATIENT
Start: 2022-09-07 | End: 2022-09-08 | Stop reason: HOSPADM

## 2022-09-07 RX ORDER — PHENYLEPHRINE HCL IN 0.9% NACL 50MG/250ML
PLASTIC BAG, INJECTION (ML) INTRAVENOUS CONTINUOUS PRN
Status: DISCONTINUED | OUTPATIENT
Start: 2022-09-07 | End: 2022-09-07 | Stop reason: SURG

## 2022-09-07 RX ORDER — IBUPROFEN 200 MG
16-32 TABLET ORAL AS NEEDED
Status: DISCONTINUED | OUTPATIENT
Start: 2022-09-07 | End: 2022-09-08 | Stop reason: HOSPADM

## 2022-09-07 RX ORDER — ALUMINUM HYDROXIDE, MAGNESIUM HYDROXIDE, AND SIMETHICONE 1200; 120; 1200 MG/30ML; MG/30ML; MG/30ML
30 SUSPENSION ORAL EVERY 4 HOURS PRN
Status: DISCONTINUED | OUTPATIENT
Start: 2022-09-07 | End: 2022-09-08 | Stop reason: HOSPADM

## 2022-09-07 RX ORDER — DOXYCYCLINE HYCLATE 100 MG
100 TABLET ORAL EVERY 12 HOURS
Status: DISCONTINUED | OUTPATIENT
Start: 2022-09-08 | End: 2022-09-08 | Stop reason: HOSPADM

## 2022-09-07 RX ORDER — LISINOPRIL 40 MG/1
40 TABLET ORAL DAILY
Status: DISCONTINUED | OUTPATIENT
Start: 2022-09-07 | End: 2022-09-08 | Stop reason: HOSPADM

## 2022-09-07 RX ORDER — FENTANYL CITRATE 50 UG/ML
50 INJECTION, SOLUTION INTRAMUSCULAR; INTRAVENOUS
Status: DISCONTINUED | OUTPATIENT
Start: 2022-09-07 | End: 2022-09-07 | Stop reason: HOSPADM

## 2022-09-07 RX ORDER — ROPIVACAINE HYDROCHLORIDE 5 MG/ML
INJECTION, SOLUTION EPIDURAL; INFILTRATION; PERINEURAL
Status: COMPLETED | OUTPATIENT
Start: 2022-09-07 | End: 2022-09-07

## 2022-09-07 RX ORDER — DEXAMETHASONE SODIUM PHOSPHATE 4 MG/ML
INJECTION, SOLUTION INTRA-ARTICULAR; INTRALESIONAL; INTRAMUSCULAR; INTRAVENOUS; SOFT TISSUE
Status: COMPLETED | OUTPATIENT
Start: 2022-09-07 | End: 2022-09-07

## 2022-09-07 RX ORDER — EPHEDRINE SULFATE 50 MG/ML
INJECTION, SOLUTION INTRAVENOUS AS NEEDED
Status: DISCONTINUED | OUTPATIENT
Start: 2022-09-07 | End: 2022-09-07 | Stop reason: SURG

## 2022-09-07 RX ORDER — DEXTROSE 50 % IN WATER (D50W) INTRAVENOUS SYRINGE
25 AS NEEDED
Status: DISCONTINUED | OUTPATIENT
Start: 2022-09-07 | End: 2022-09-08 | Stop reason: HOSPADM

## 2022-09-07 RX ORDER — SODIUM CHLORIDE 9 MG/ML
INJECTION, SOLUTION INTRAVENOUS CONTINUOUS
Status: DISCONTINUED | OUTPATIENT
Start: 2022-09-07 | End: 2022-09-08 | Stop reason: HOSPADM

## 2022-09-07 RX ADMIN — PHENYLEPHRINE HYDROCHLORIDE 100 MCG: 10 INJECTION INTRAVENOUS at 13:37

## 2022-09-07 RX ADMIN — PHENYLEPHRINE HYDROCHLORIDE 10 MCG/MIN: 10 INJECTION INTRAVENOUS at 13:41

## 2022-09-07 RX ADMIN — SODIUM CHLORIDE, SODIUM GLUCONATE, SODIUM ACETATE, POTASSIUM CHLORIDE AND MAGNESIUM CHLORIDE: 526; 502; 368; 37; 30 INJECTION, SOLUTION INTRAVENOUS at 13:42

## 2022-09-07 RX ADMIN — SODIUM CHLORIDE: 9 INJECTION, SOLUTION INTRAVENOUS at 18:18

## 2022-09-07 RX ADMIN — DEXAMETHASONE SODIUM PHOSPHATE 4 MG: 4 INJECTION, SOLUTION INTRA-ARTICULAR; INTRALESIONAL; INTRAMUSCULAR; INTRAVENOUS; SOFT TISSUE at 12:08

## 2022-09-07 RX ADMIN — SODIUM CHLORIDE: 0.9 INJECTION, SOLUTION INTRAVENOUS at 11:58

## 2022-09-07 RX ADMIN — PHENYLEPHRINE HYDROCHLORIDE 100 MCG: 10 INJECTION INTRAVENOUS at 12:36

## 2022-09-07 RX ADMIN — FENTANYL CITRATE 25 MCG: 50 INJECTION, SOLUTION INTRAMUSCULAR; INTRAVENOUS at 15:23

## 2022-09-07 RX ADMIN — PHENYLEPHRINE HYDROCHLORIDE 100 MCG: 10 INJECTION INTRAVENOUS at 12:51

## 2022-09-07 RX ADMIN — FENTANYL CITRATE 50 MCG: 50 INJECTION, SOLUTION INTRAMUSCULAR; INTRAVENOUS at 12:05

## 2022-09-07 RX ADMIN — PHENYLEPHRINE HYDROCHLORIDE 100 MCG: 10 INJECTION INTRAVENOUS at 13:12

## 2022-09-07 RX ADMIN — MIDAZOLAM HYDROCHLORIDE 1 MG: 1 INJECTION, SOLUTION INTRAMUSCULAR; INTRAVENOUS at 12:05

## 2022-09-07 RX ADMIN — FENTANYL CITRATE 25 MCG: 50 INJECTION, SOLUTION INTRAMUSCULAR; INTRAVENOUS at 13:22

## 2022-09-07 RX ADMIN — PHENYLEPHRINE HYDROCHLORIDE 100 MCG: 10 INJECTION INTRAVENOUS at 13:31

## 2022-09-07 RX ADMIN — SENNOSIDES AND DOCUSATE SODIUM 1 TABLET: 50; 8.6 TABLET ORAL at 20:33

## 2022-09-07 RX ADMIN — PHENYLEPHRINE HYDROCHLORIDE 100 MCG: 10 INJECTION INTRAVENOUS at 12:54

## 2022-09-07 RX ADMIN — PHENYLEPHRINE HYDROCHLORIDE 100 MCG: 10 INJECTION INTRAVENOUS at 13:20

## 2022-09-07 RX ADMIN — PROPOFOL 150 MG: 10 INJECTION, EMULSION INTRAVENOUS at 12:21

## 2022-09-07 RX ADMIN — FENTANYL CITRATE 25 MCG: 50 INJECTION, SOLUTION INTRAMUSCULAR; INTRAVENOUS at 15:27

## 2022-09-07 RX ADMIN — ONDANSETRON 4 MG: 2 INJECTION INTRAMUSCULAR; INTRAVENOUS at 15:18

## 2022-09-07 RX ADMIN — FENTANYL CITRATE 25 MCG: 50 INJECTION, SOLUTION INTRAMUSCULAR; INTRAVENOUS at 12:21

## 2022-09-07 RX ADMIN — PHENYLEPHRINE HYDROCHLORIDE 100 MCG: 10 INJECTION INTRAVENOUS at 13:17

## 2022-09-07 RX ADMIN — CEFAZOLIN 2 G: 2 INJECTION, POWDER, FOR SOLUTION INTRAMUSCULAR; INTRAVENOUS at 23:16

## 2022-09-07 RX ADMIN — BENZOCAINE AND MENTHOL 1 LOZENGE: 15; 2.6 LOZENGE ORAL at 23:18

## 2022-09-07 RX ADMIN — Medication 1 DROP: at 20:33

## 2022-09-07 RX ADMIN — ROCURONIUM BROMIDE 10 MG: 10 INJECTION, SOLUTION INTRAVENOUS at 13:30

## 2022-09-07 RX ADMIN — PHENYLEPHRINE HYDROCHLORIDE 100 MCG: 10 INJECTION INTRAVENOUS at 13:09

## 2022-09-07 RX ADMIN — PHENYLEPHRINE HYDROCHLORIDE 100 MCG: 10 INJECTION INTRAVENOUS at 13:34

## 2022-09-07 RX ADMIN — BENZOCAINE AND MENTHOL 1 LOZENGE: 15; 2.6 LOZENGE ORAL at 18:31

## 2022-09-07 RX ADMIN — CEFAZOLIN SODIUM 2 G: 2 INJECTION, SOLUTION INTRAVENOUS at 14:05

## 2022-09-07 RX ADMIN — ROCURONIUM BROMIDE 50 MG: 10 INJECTION, SOLUTION INTRAVENOUS at 12:21

## 2022-09-07 RX ADMIN — ROPIVACAINE HYDROCHLORIDE 25 ML: 5 INJECTION, SOLUTION EPIDURAL; INFILTRATION; PERINEURAL at 12:08

## 2022-09-07 RX ADMIN — SUGAMMADEX 105.2 MG: 100 INJECTION, SOLUTION INTRAVENOUS at 15:42

## 2022-09-07 RX ADMIN — EPHEDRINE SULFATE 20 MG: 50 INJECTION, SOLUTION INTRAVENOUS at 12:33

## 2022-09-07 ASSESSMENT — PATIENT HEALTH QUESTIONNAIRE - PHQ9: SUM OF ALL RESPONSES TO PHQ9 QUESTIONS 1 & 2: 0

## 2022-09-07 NOTE — ANESTHESIA PREPROCEDURE EVALUATION
Relevant Problems   CARDIOVASCULAR   (+) HTN (hypertension)      URINARY SYSTEM   (+) Hyponatremia       Anesthesia ROS/MED HX    Anesthesia History    Previous anesthetics  No history of anesthetic complications  Cardiovascular   hypertension   Covid19 Test Reviewed and ECG reviewed   Normal ECG  Hematological    no thrombocytopeniano anemia  Renal Disease   electrolyte problem  Endo/Other  Body Habitus: Normal       Past Surgical History:   Procedure Laterality Date    APPENDECTOMY      CATARACT EXTRACTION, BILATERAL      COLONOSCOPY      JOINT REPLACEMENT Right     shoulder, right hip 2013       Physical Exam    Airway   Mallampati: I   TM distance: >3 FB   Neck ROM: full  Cardiovascular - normal   Rhythm: regular   Rate: normalPulmonary - normal   clear to auscultation  Dental    Teeth Problems: upper dentures and partials        Anesthesia Plan    Plan: general    Technique: general endotracheal and nerve block     Lines and Monitors: PIV     Airway: direct visual laryngoscopy and oral intubation   ASA 2  Blood Products:   Use of Blood Products Discussed: No   Anesthetic plan and risks discussed with: patient  Induction:    intravenous   Postop Plan:   Patient Disposition: phase II then home   Pain Management: IV analgesics and interscalene block

## 2022-09-07 NOTE — OR SURGEON
Pre-Procedure patient identification:  I am the primary operating surgeon/proceduralist and I have identified the patient on 09/07/22 at 11:00 AM Wade Schulz MD  Phone Number: [phone]

## 2022-09-07 NOTE — PROGRESS NOTES
Orthopaedic Surgery Progress Note    Interval History:  Patient is resting comfortably in bed. Pain currently well controlled. Patient responds to questions appropriately and follows commands.     Vital Signs:   Vitals:    09/07/22 1630   BP: 126/62   Pulse: 70   Resp: (!) 60   Temp:    SpO2: 99%       Physical Exam:   General:   No acute distress   Symmetric chest rise bilaterally with normal effort     Musculoskeletal:   Right Upper Extremity  Inspection: Surgical incisions without erythema or drainage. Appropriate tenderness around the incision.  Motor: Unable to assess secondary to nerve block  Sensory: Unable to assess secondary to nerve block  Vascular: Palpable radial pulse. Brisk capillary refill     Assessment and Plan   Fernanda Cazares is a 82 y.o. female who is status post revision right reverse shoulder arthroplasty    -- POD: Day of Surgery   -- WBS: NWB   -- ROM: none  -- Brace: ultrasling with abduction pillow  -- Analgesia   -- DVT PPX: asa 81 mg qDaily   -- Abx: doxy x 2 weeks  -- Follow OR cultures  -- PT OT recs appreciated   -- [facility] recs appreciated    Horace Bates MD   Orthopaedic Surgery PGY-1

## 2022-09-07 NOTE — OP NOTE
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  Following surgery, her glenoid sphere tilted superiorly and ripped out of the glenoid fossa.  She has completed persistent pain and limited function and therefore is therefore was brought to the operating for above procedure.    Findings: At the time of surgery her humeral component was relatively well fixed but we had to remove the proximal portion of it to expose the glenoid.  The glenoid was removed without difficulty but there was a broken screw that did require a fair amount of work to remove.  We switched to a DJ O glenoid sphere with a posterior superior bone graft using humeral head allograft.  The glenoid was quite small.  We could only get 3 screws peripherally the anterior screw was not over bone and it was not because it was deficient it was just a small glenoid.  The remaining 3 screws were all 26 mm and all had excellent purchase before engaging the plate.  In addition, the central screw had what I would consider to be average to slightly better than average fixation but not outstanding.  We placed a 32-4 glenoid sphere and I purposely inferiorly tilted it a fair amount in order to convert the deltoid force into a compressive force rather than a perpendicular force.  At the completion of the procedure she had approximately 40 or 45 degrees of external rotation with her arm at her side and she had no instability and no impingement.      Description of procedure:  After proper written consent was obtained the patient was brought to the operating room placed on the operating table in the supine position.  After adequate anesthesia been obtained, the patient was brought to the edge of the operating table the operating table was placed in a semirecumbent position and the patient's right arm and shoulder were prepped and draped in normal sterile fashion.  Her previous skin incision was utilized and was carried sharply down the level deep fascia.  There were dense adhesions between the pectoralis major and underlying conjoined tendon in between the deltoid and the underlying humerus and within the subacromial space.  These were lysed both bluntly and sharply and I retracted pectoralis major medially and deltoid laterally.  There really was no cephalic vein.  I then bluntly dissected under the conjoined tendon there were dense adhesions there as well.  I worked from proximal to distal and was able to free up this interface quite nicely and identify the axillary nerve.  I then tagged the anterior soft tissue envelope which I thought did include a lot of subscapularis with 2 nonabsorbable sutures.  I then reflected the entire envelope off the anterior humerus.  Prior to doing this, we sent culture from fluid, the rotator interval, and bicipital groove.  After delivering the humerus into the wound, we then remove the polyethylene.  It was clear to me that the proximal portion of the implant even though it was well fixed made it impossible to really adequately expose the glenoid.  Therefore, this was removed without difficulty.  I then remove the central screw from the glenoid sphere and then remove the glenoid sphere.  3 of the peripheral screws in the baseplate were removed easily.  The fourth 1 was broken and we are able to remove part of it easily.  We then took out the entire baseplate.  I then had used a combination of osteotomes and a bur to loosen the remaining screw.  I then  completely freed up the subscapularis from the underlying scapula.  I then palpated the medial most extent of the depth of the glenoid.  I then started my guidepin aiming slightly anteverted to this so that I was trying to hit the alternate central line.  I got reasonably good purchase.  I then remove the guidepin and placed the tap to the appropriate depth and I could feel the tap medially.  I then prepared the graft to fill in the posterior superior defect.  This was impacted into position.  I then removed the tap and placed the baseplate and we actually were able to turn the entire scapula with the baseplate but was not as strong a bite as it could be.  I would say it was average to slightly better than average.  The anterior peripheral screw was at a place that was wider than the native glenoid.  Therefore we placed the remaining 3 peripheral screws and all had excellent purchase before engaging the plate.  We then passed the peripheral reamer around the baseplate.  I then irrigated and dried the taper and placed a 32-4 glenoid sphere and impacted into position.  Once it was impacted I attempted to try to remove it with a's small Corbin elevator and it was solidly fixed.  I then had it several more times with an impactor and mallet and placed the central locking screw.  I then redelivered the humerus into the wound.  I then placed a new 36 tray and screwed it into position using the torque wrench.  I then trialed a +3 and a +6 liner and I felt the +6 was best.  Therefore, I redelivered the humerus into the wound and impacted a +6 liner into position.  I then reduced the humerus I thought we had excellent stability with no impingement and the glenoid sphere appeared to be stable.  We then copiously pulse irrigated as we did throughout multiple portion of the procedure using a total of 3 L.  I then closed subscapularis to the anterior humerus through bone with a 2 permanent sutures that had previously been placed on  the subscapularis.  We tied them and with the subscapularis tied the patient had 40 degrees maybe 45 with external rotation with her arm at her side.  Digital palpation was again used to verify the integrity of the axillary nerve.  We again copiously pulse irrigated.  Wound was then closed in standard fashion with 2-0 Vicryl in the deltopectoral interval, 2-0 Vicryl in the subcutaneous tissue, and a running subcuticular 3-0 Monocryl for skin.  Patient was then placed in a sterile dressing and an UltraSling.  She tolerated the procedure well.  I was personally present for the key portions the operation which included everything except the skin incision and superficial dissection and skin closure.

## 2022-09-07 NOTE — CONSULTS
Hospital Medicine Service -  Daily Progress Note       SUBJECTIVE   Interval History: Pt seen and examined. Denies any Cp or Sob, no dizziness or lightheadedness, no N/V.     OBJECTIVE      Vital signs in last 24 hours:  Temp:  [36.1 °C (97 °F)-36.3 °C (97.3 °F)] 36.1 °C (97 °F)  Heart Rate:  [69-94] 72  Resp:  [14-30] 19  BP: (126-182)/(60-86) 132/63    Intake/Output Summary (Last 24 hours) at 9/7/2022 1716  Last data filed at 9/7/2022 1530  Gross per 24 hour   Intake 1220 ml   Output 150 ml   Net 1070 ml       PHYSICAL EXAMINATION      Physical Exam  Constitutional:       General: She is not in acute distress.     Appearance: She is not toxic-appearing.   HENT:      Head: Normocephalic and atraumatic.   Eyes:      Conjunctiva/sclera: Conjunctivae normal.   Cardiovascular:      Rate and Rhythm: Normal rate and regular rhythm.      Heart sounds: Normal heart sounds.   Pulmonary:      Effort: Pulmonary effort is normal.      Breath sounds: Normal breath sounds.   Abdominal:      General: Bowel sounds are normal. There is no distension.      Palpations: Abdomen is soft.      Tenderness: There is no abdominal tenderness.   Musculoskeletal:      Cervical back: Neck supple.      Comments: RUE in sling/immobilizer   Skin:     General: Skin is warm and dry.   Neurological:      Mental Status: She is alert and oriented to person, place, and time.   Psychiatric:         Mood and Affect: Mood normal.         Behavior: Behavior normal.            LINES, CATHETERS, DRAINS, AIRWAYS, AND WOUNDS   Lines, Drains, and Airways:  Wounds (agree with documentation and present on admission):  Peripheral IV (Adult) 09/07/22 Anterior;Left Wrist (Active)   Number of days: 0       Surgical Incision Shoulder Right (Active)   Number of days: 0         Comments:      LABS / IMAGING / TELE      Labs  preop labs reviewed    SARS-CoV-2 (COVID-19) (no units)   Date/Time Value   09/02/2022 1055 Negative       Imaging  n/a    ECG/Telemetry  NSR  on tele    ASSESSMENT AND PLAN      S/P shoulder surgery  Assessment & Plan  Care, PT/OT per ortho recs  Prn pain control, bowel regimen  DVT proph  Encourage incentive spirometry  Monitor post op labs    Hyponatremia  Assessment & Plan  Mild, chronic  Na 128 on preop labs, pt reported repeat with   Avoid NSAID use  Encouraged po intake, hydration  Monitor BMP    HTN (hypertension)  Assessment & Plan  Cont lisinopril with hold parameters  Monitor BP         VTE Assessment: Padua    VTE Prophylaxis:  Current anticoagulants:    None      Code Status: No Order      Estimated Discharge Date:      Disposition Planning: per orthopedics     French Wilson MD  9/7/2022

## 2022-09-07 NOTE — ASSESSMENT & PLAN NOTE
Care, PT/OT per ortho recs  Prn pain control, bowel regimen  DVT proph  Encourage incentive spirometry  Monitor post op labs

## 2022-09-07 NOTE — ANESTHESIA PROCEDURE NOTES
Peripheral Block    Patient location during procedure: pre-op  Start time: 9/7/2022 12:05 PM  End time: 9/7/2022 12:08 PM  Reason for block: at surgeon's request and post-op pain management  Staffing  Performed: anesthesiologist   Anesthesiologist: Barber Yanez DO  Preanesthetic Checklist  Completed: patient identified, surgical consent, pre-op evaluation, timeout performed, IV checked, risks and benefits discussed, monitors and equipment checked and sterile field maintained during procedure  Peripheral Block  Patient position: supine  Prep: ChloraPrep and site prepped and draped  Patient monitoring: heart rate, cardiac monitor, continuous pulse ox and blood pressure  Block type: interscalene  Laterality: right  Injection technique: single-shot      Guidance: nerve stimulator and ultrasound guided    Image visualization comments : Ultrasound used to visualize needle placement in appropriate tissue plane.: Ultrasound used to visualize medication disbursement around appropriate nerve structures.      Needle  Needle type: Tuohy   Needle gauge: 21 G  Needle length: 3 1/8 in  Needle localization: ultrasound guidance and nerve stimulator  Test dose: negative  Assessment  Injection assessment: negative aspiration for heme, incremental injection, no paresthesia on injection, local visualized surrounding nerve on ultrasound and transient paresthesias  Paresthesia pain: immediately resolved  Heart rate change: no  Slow fractionated injection: yes  Medications Administered -   ropivacaine PF (NAROPIN) injection 0.5 % - perineural injection   25 mL - 9/7/2022 12:08:00 PM  dexamethasone (DECADRON) injection 4 mg/mL - perineural injection   4 mg - 9/7/2022 12:08:00 PM

## 2022-09-07 NOTE — ANESTHESIA PROCEDURE NOTES
Airway  Urgency: elective    Start Time: 9/7/2022 12:24 PM    General Information and Staff    Patient location during procedure: OR  Anesthesiologist: Barber Yanez DO  Resident/CRNA: Chelly Gayle CRNA  Performed: anesthesiologist     Indications and Patient Condition  Indications for airway management: anesthesia  Sedation level: deep  Preoxygenated: yes  Patient position: sniffing  Mask difficulty assessment: 1 - vent by mask    Final Airway Details  Final airway type: endotracheal airway      Successful airway: ETT     Successful intubation technique: direct laryngoscopy  Facilitating devices/methods: intubating stylet  Endotracheal tube insertion site: oral  Blade: Keren  Blade size: #4  ETT size (mm): 7.0  Cormack-Lehane Classification: grade I - full view of glottis  Placement verified by: chest auscultation, capnometry and palpation of cuff   Measured from: lips  ETT to lips (cm): 21  Number of attempts at approach: 1

## 2022-09-07 NOTE — ASSESSMENT & PLAN NOTE
Mild, chronic  Na 128 on preop labs, pt reported repeat with   Avoid NSAID use  Encouraged po intake, hydration  Monitor BMP

## 2022-09-07 NOTE — ANESTHESIOLOGIST PRE-PROCEDURE ATTESTATION
Pre-Procedure Patient Identification:  I am the Primary Anesthesiologist and have identified the patient on 09/07/22 at 11:35 AM.   I have confirmed the procedure(s) will be performed by the following surgeon/proceduralist Wade Schulz MD.

## 2022-09-07 NOTE — PLAN OF CARE
Plan of Care Review  Plan of Care Reviewed With: patient  Progress: improving  Outcome Summary: s/p right total shoulder revision.  s/p block RUE; pt numb and limited movement.  denies pain at this time.  tolerating clear liquids.  DTV 2400.   at bedside.

## 2022-09-07 NOTE — PLAN OF CARE
Shoulder and Elbow Surgery Postop Plan of Care Note:     POD#0 s/p Revision R RSA      -DVT prophy: ASA 81 mg QDaily    -Abx: 2 weeks postop doxy, f/u intraop Cx   -NWB   -ROM: none  -Ultrasling, w/ abduction pillow       Ish Currie MD  Shoulder and Elbow Fellow   [facility] [place]

## 2022-09-08 VITALS
DIASTOLIC BLOOD PRESSURE: 61 MMHG | SYSTOLIC BLOOD PRESSURE: 109 MMHG | RESPIRATION RATE: 18 BRPM | WEIGHT: 116 LBS | OXYGEN SATURATION: 98 % | HEIGHT: 63 IN | HEART RATE: 89 BPM | TEMPERATURE: 97.4 F | BODY MASS INDEX: 20.55 KG/M2

## 2022-09-08 LAB
ANION GAP SERPL CALC-SCNC: 6 MEQ/L (ref 3–15)
BUN SERPL-MCNC: 15 MG/DL (ref 8–20)
CALCIUM SERPL-MCNC: 8.2 MG/DL (ref 8.9–10.3)
CHLORIDE SERPL-SCNC: 101 MEQ/L (ref 98–109)
CO2 SERPL-SCNC: 23 MEQ/L (ref 22–32)
CREAT SERPL-MCNC: 0.5 MG/DL (ref 0.6–1.1)
CRYSTALS SNV MICRO: NORMAL
ERYTHROCYTE [DISTWIDTH] IN BLOOD BY AUTOMATED COUNT: 12.7 % (ref 11.7–14.4)
FUNGUS STAIN: NORMAL
GFR SERPL CREATININE-BSD FRML MDRD: >60 ML/MIN/1.73M*2
GLUCOSE SERPL-MCNC: 101 MG/DL (ref 70–99)
HCT VFR BLDCO AUTO: 29.4 % (ref 35–45)
HGB BLD-MCNC: 10.1 G/DL (ref 11.8–15.7)
MCH RBC QN AUTO: 33 PG (ref 28–33.2)
MCHC RBC AUTO-ENTMCNC: 34.4 G/DL (ref 32.2–35.5)
MCV RBC AUTO: 96.1 FL (ref 83–98)
PDW BLD AUTO: 9.2 FL (ref 9.4–12.3)
PLATELET # BLD AUTO: 238 K/UL (ref 150–369)
POTASSIUM SERPL-SCNC: 4.3 MEQ/L (ref 3.6–5.1)
RBC # BLD AUTO: 3.06 M/UL (ref 3.93–5.22)
RHODAMINE-AURAMINE STN SPEC: NORMAL
SODIUM SERPL-SCNC: 130 MEQ/L (ref 136–144)
WBC # BLD AUTO: 8.16 K/UL (ref 3.8–10.5)

## 2022-09-08 PROCEDURE — 63700000 HC SELF-ADMINISTRABLE DRUG

## 2022-09-08 PROCEDURE — 97166 OT EVAL MOD COMPLEX 45 MIN: CPT | Mod: GO

## 2022-09-08 PROCEDURE — 97535 SELF CARE MNGMENT TRAINING: CPT | Mod: GO

## 2022-09-08 PROCEDURE — 85027 COMPLETE CBC AUTOMATED: CPT | Performed by: NURSE PRACTITIONER

## 2022-09-08 PROCEDURE — 25800000 HC PHARMACY IV SOLUTIONS: Performed by: NURSE PRACTITIONER

## 2022-09-08 PROCEDURE — 80048 BASIC METABOLIC PNL TOTAL CA: CPT | Performed by: NURSE PRACTITIONER

## 2022-09-08 PROCEDURE — 63600000 HC DRUGS/DETAIL CODE: Performed by: NURSE PRACTITIONER

## 2022-09-08 PROCEDURE — 36415 COLL VENOUS BLD VENIPUNCTURE: CPT | Performed by: NURSE PRACTITIONER

## 2022-09-08 PROCEDURE — 97162 PT EVAL MOD COMPLEX 30 MIN: CPT | Mod: GP

## 2022-09-08 PROCEDURE — 63700000 HC SELF-ADMINISTRABLE DRUG: Performed by: NURSE PRACTITIONER

## 2022-09-08 RX ORDER — DOXYCYCLINE 100 MG/1
100 CAPSULE ORAL 2 TIMES DAILY
Qty: 28 CAPSULE | Refills: 0
Start: 2022-09-08 | End: 2022-09-22

## 2022-09-08 RX ORDER — NAPROXEN SODIUM 220 MG/1
81 TABLET, FILM COATED ORAL DAILY
Refills: 0
Start: 2022-09-08

## 2022-09-08 RX ORDER — ACETAMINOPHEN 500 MG
1000 TABLET ORAL
Refills: 0
Start: 2022-09-08

## 2022-09-08 RX ORDER — OXYCODONE HYDROCHLORIDE 5 MG/1
5 TABLET ORAL EVERY 6 HOURS PRN
Refills: 0
Start: 2022-09-08 | End: 2022-09-13

## 2022-09-08 RX ADMIN — ASPIRIN 81 MG CHEWABLE TABLET 81 MG: 81 TABLET CHEWABLE at 08:56

## 2022-09-08 RX ADMIN — LISINOPRIL 40 MG: 40 TABLET ORAL at 08:56

## 2022-09-08 RX ADMIN — CEFAZOLIN 2 G: 2 INJECTION, POWDER, FOR SOLUTION INTRAMUSCULAR; INTRAVENOUS at 06:08

## 2022-09-08 RX ADMIN — OXYCODONE HYDROCHLORIDE 5 MG: 5 TABLET ORAL at 02:44

## 2022-09-08 RX ADMIN — OXYCODONE HYDROCHLORIDE 10 MG: 5 TABLET ORAL at 07:37

## 2022-09-08 RX ADMIN — SENNOSIDES AND DOCUSATE SODIUM 1 TABLET: 50; 8.6 TABLET ORAL at 08:56

## 2022-09-08 RX ADMIN — Medication 1 DROP: at 08:57

## 2022-09-08 RX ADMIN — ACETAMINOPHEN 650 MG: 325 TABLET ORAL at 02:44

## 2022-09-08 ASSESSMENT — COGNITIVE AND FUNCTIONAL STATUS - GENERAL
EATING MEALS: 4 - NONE
MOVING TO AND FROM BED TO CHAIR: 4 - NONE
AFFECT: WFL
DRESSING REGULAR LOWER BODY CLOTHING: 4 - NONE
DRESSING REGULAR UPPER BODY CLOTHING: 3 - A LITTLE
CLIMB 3 TO 5 STEPS WITH RAILING: 4 - NONE
TOILETING: 4 - NONE
WALKING IN HOSPITAL ROOM: 4 - NONE
HELP NEEDED FOR PERSONAL GROOMING: 3 - A LITTLE
AFFECT: WNL
STANDING UP FROM CHAIR USING ARMS: 4 - NONE
HELP NEEDED FOR BATHING: 3 - A LITTLE

## 2022-09-08 ASSESSMENT — PAIN SCALES - GENERAL: PAIN_LEVEL: 1

## 2022-09-08 NOTE — PLAN OF CARE
Problem: Adult Inpatient Plan of Care  Goal: Plan of Care Review  9/8/2022 1148 by Maria L Wiley RN  Outcome: Met  Flowsheets (Taken 9/8/2022 1148)  Progress: improving  Plan of Care Reviewed With: patient  Outcome Summary: Patient VSS, with no complaints of lightheadedness, numbness, or nausea. Complained of back pain. Oxycodone was administered and patient said it helped. Tolerated morning medications well. Patient ready for discharge.  9/8/2022 1148 by Maria L Wiley RN  Outcome: Met  Goal: Patient-Specific Goal (Individualized)  9/8/2022 1148 by Maria L Wiley RN  Outcome: Met  9/8/2022 1148 by Maria L Wiley RN  Outcome: Met  Goal: Absence of Hospital-Acquired Illness or Injury  9/8/2022 1148 by Maria L Wiley RN  Outcome: Met  9/8/2022 1148 by Maria L Wiley RN  Outcome: Met  Goal: Optimal Comfort and Wellbeing  9/8/2022 1148 by Maria L Wiley RN  Outcome: Met  9/8/2022 1148 by Maria L Wiley RN  Outcome: Met  Goal: Readiness for Transition of Care  9/8/2022 1148 by Maria L Wiley RN  Outcome: Met  9/8/2022 1148 by Maria L Wiley RN  Outcome: Met     Problem: Fall Injury Risk  Goal: Absence of Fall and Fall-Related Injury  9/8/2022 1148 by Maria L Wiley RN  Outcome: Met  9/8/2022 1148 by Maria L Wiley RN  Outcome: Met     Problem: Bleeding (Shoulder Arthroplasty)  Goal: Absence of Bleeding  9/8/2022 1148 by Maria L Wiley RN  Outcome: Met  9/8/2022 1148 by Maria L Wiley RN  Outcome: Met     Problem: Bowel Motility Impaired (Shoulder Arthroplasty)  Goal: Effective Bowel Elimination  9/8/2022 1148 by Maria L Wiley RN  Outcome: Met  9/8/2022 1148 by Maria L Wiley RN  Outcome: Met     Problem: Infection (Shoulder Arthroplasty)  Goal: Absence of Infection Signs and Symptoms  9/8/2022 1148 by Maria L Wiley RN  Outcome: Met  9/8/2022 1148 by Maria L Wiley, RN  Outcome: Met     Problem: Joint Function Impaired (Shoulder Arthroplasty)  Goal: Optimal Functional Ability  9/8/2022 1148 by Inez  LORA Lisa  Outcome: Met  9/8/2022 1148 by Maria L Wiley RN  Outcome: Met     Problem: Ongoing Anesthesia Effects (Shoulder Arthroplasty)  Goal: Anesthesia/Sedation Recovery  9/8/2022 1148 by Maria L Wiley RN  Outcome: Met  9/8/2022 1148 by Maria L Wiley RN  Outcome: Met     Problem: Pain (Shoulder Arthroplasty)  Goal: Acceptable Pain Control  9/8/2022 1148 by Maria L Wiley RN  Outcome: Met  9/8/2022 1148 by Maria L Wiley RN  Outcome: Met     Problem: Postoperative Nausea and Vomiting (Shoulder Arthroplasty)  Goal: Nausea and Vomiting Relief  9/8/2022 1148 by Maria L Wiley RN  Outcome: Met  9/8/2022 1148 by Maria L Wiley RN  Outcome: Met     Problem: Postoperative Urinary Retention (Shoulder Arthroplasty)  Goal: Effective Urinary Elimination  9/8/2022 1148 by Maria L Wiley RN  Outcome: Met  9/8/2022 1148 by Maria L Wiley RN  Outcome: Met

## 2022-09-08 NOTE — PLAN OF CARE
Plan of Care Review  Plan of Care Reviewed With: patient  Progress: improving  Outcome Summary: Pt. Ax1 stand-by to BR, voiding. NWB to RUE in ultrasling w/ abduction pillow. PRN cepacol for reported sore throat. No pain at beginning of shift d/t block.PRN tylenol and oxycodone given for pain. IV NSS 80 mL/hr and Ancef given. Plan for PT/OT and anticipated d/c home when cleared.

## 2022-09-08 NOTE — DISCHARGE INSTRUCTIONS
Procedure name: Reverse Ball and Socket replacement - Zeus     Symptoms to call for instructions  The following are CONCERNING SYMPTOMS after a reverse shoulder replacement    PLEASE CALL YOUR DOCTOR IF:                * You have excessive redness of the incision.              * You have drainage for more than 4 days.              * You have a fever greater than 101.5 degrees Fahrenheit.    Activity restrictions instructions  The following are ACTIVITY RESTRICTIONS after a reverse shoulder replacement    * A sling has been provided for you.  * After the first 48 to 72 hours, you may remove the sling to bathe and dress and perform kervin ctivities taught to you prior to discharge. Otherwise, the sling should be worn.    Wound care instructions  The following are INCISION CARE instructions after a reverse shoulder replacement           * Use ice on the shoulder intermittently over the first 48 hours after surgery                    (20 minutes on and 20 minutes off).  If you have a beige tape dressing, you may remove it after 2 days.  If you have a clear plastic dressing, it is waterproof and should not be removed  * You may shower immediately with the waterproof dressing. Otherwise after the 5th day.  The incision site can get wet after day 5 but CANNOT be submerged under water until your sutures/staples are removed.  * Do not rub the incision.  Make sure your axilla (armpit) is completely dry after showering.    Additional supplement instructions  In addition,    * Pain medication has been prescribed for you.  Take a stool softener (Colace, Dulcolax or Senakot) if you are using narcotic pain medications.  * Use your medication liberally as directed over the first 48 hours, and then begin to taper the use of your narcotic.  You may take Extra Strength Tylenol or Tylenol in addition to the narcotic pills early in the postoperative period and in place of them while titrating off of the narcotics.  * DO NOT take ANY  nonsteroidal anti-inflammatory pain medications: Advil, Motrin, Ibuprofen, Aleve, Naproxen, or Naprosyn.      * Take one 81mg aspirin once a day for 6 weeks after surgery, unless you have an aspirin sensitivity /allergy or asthma.      * Please call [phone] or [phone] for any problems.  * Please call [phone] to make a follow-up appointment.  You should see the doctor 10-14 days after your surgery.

## 2022-09-08 NOTE — PLAN OF CARE
Problem: Adult Inpatient Plan of Care  Goal: Readiness for Transition of Care  Intervention: Mutually Develop Transition Plan  Flowsheets (Taken 9/8/2022 1115)  Anticipated Discharge Disposition: home with assistance  Equipment Needed After Discharge: none  Discharge Coordination/Progress: patient here for R shoulder revision  Assistive Device/Animal Currently Used at Home: none  Anticipated Changes Related to Illness: none  Readmission Within the Last 30 Days: no previous admission in last 30 days  Patient/Family Anticipated Services at Transition: none  Patient/Family Anticipates Transition to:   home   home with family  Transportation Anticipated: family or friend will provide  Concerns to be Addressed:   no discharge needs identified   denies needs/concerns at this time    Sw met with patient at bedside. She lives with her spouse in a 1 floor home. 1 step to enter. She is completely independent with ambulation and ADL's, no AD or DME. She has a cane and walker from previous hip surgery if needed. She confirms plan for home today, spouse will transport and denies any needs. PT cleared for home.    Dispo- home

## 2022-09-08 NOTE — HOSPITAL COURSE
Fernanda is a 82 y.o. female admitted on 9/7/2022 with Loosening of shoulder joint prosthesis, subsequent encounter [T84.038D, Z96.619]. Principal problem is Loosening of shoulder joint prosthesis, subsequent encounter.    Past Medical History  Fernanda has a past medical history of Hematuria, Hypertension, Skin cancer, and Thyroid nodule.    History of Present Illness    82 y.o. female who is status post revision right reverse shoulder arthroplasty on 9/7/22  nonWB RUE, no ROM

## 2022-09-08 NOTE — PROGRESS NOTES
Patient:  Fernanda Cazares  Location:  [facility] 5PAV 5448  MRN:  [id number]  Today's date:  9/8/2022    RN cleared for therapy and pt agreeable. Session ended w/ pt OOB in recliner, call bell and personal belongings in reach, and all other needs met. NAD. RN aware of therapy encounter. Pt IND w/ transfers.    Fernanda is a 82 y.o. female admitted on 9/7/2022 with Loosening of shoulder joint prosthesis, subsequent encounter [T84.038D, Z96.619]. Principal problem is Loosening of shoulder joint prosthesis, subsequent encounter.    Past Medical History  Fernanda has a past medical history of Hematuria, Hypertension, Skin cancer, and Thyroid nodule.    History of Present Illness    82 y.o. female who is status post revision right reverse shoulder arthroplasty on 9/7/22  nonWB RUE, no ROM      OT Vitals    Date/Time Pulse SpO2 Pt Activity O2 Therapy BP [facility]   09/08/22 0920 88 97 % At rest None (Room air) 106/59 DB   09/08/22 0950 87 98 % At rest None (Room air) 115/58 DB      OT Pain    Date/Time Pain Type Side/Orientation Location Rating: Rest Rating: Activity Interventions [facility]   09/08/22 0920 Pain Assessment right shoulder 4 - moderate pain 6 - moderate-severe pain position adjusted DB          Prior Living Environment    Flowsheet Row Most Recent Value   People in Home spouse   Current Living Arrangements home   Living Environment Comment lives in a [place] and older community, 1SH, no MEI, stall shower with SC        Prior Level of Function    Flowsheet Row Most Recent Value   Dominant Hand right   Ambulation independent   Transferring independent   Toileting independent   Bathing independent   Dressing independent   Prior Level of Function Comment Pt reports she was fully independent prior to surgery   Assistive Device Currently Used at Home none        Occupational Profile    Flowsheet Row Most Recent Value   Reason for Services/Referral ADL/mob dysfx   Environmental Supports and Barriers supportive spouse who can provide  assist   Patient Goals to go home           OT Evaluation and Treatment - 09/08/22 0920        OT Time Calculation    Start Time 0920     Stop Time 0950     Time Calculation (min) 30 min        Session Details    Document Type initial evaluation     Mode of Treatment occupational therapy        General Information    Patient Profile Reviewed yes     Onset of Illness/Injury or Date of Surgery 09/07/22     Referring Physician Zeus     Patient/Family/Caregiver Comments/Observations RN cleared pt for therapy     General Observations of Patient Pt greeted OOB in recliner, agreeable to therapy session     Existing Precautions/Restrictions fall;brace worn at all times;shoulder;weight bearing        Weight-bearing Status    Right UE Weight-Bearing Status non weight-bearing (NWB)        Cognition/Psychosocial    Affect/Mental Status (Cognition) WFL     Orientation Status (Cognition) oriented x 4     Follows Commands (Cognition) WFL     Cognitive Function WFL     Comment, Cognition Pleasant and cooperative        Hearing Assessment    Hearing Status WFL        Vision Assessment/Intervention    Visual Impairment/Limitations corrective lenses for reading        Sensory Assessment    Sensory Assessment (Somatosensory) UE sensation intact        Range of Motion (ROM)    Comment: Range of Motion LUE WFL except RUE not tested        Strength Comprehensive (MMT)    Comment LUE WFL except RUE not tested        Bed Mobility    Comment (Bed Mobility) NT - greeted OOB in recliner, pt stating she has recliner available for sleeping at home        Transfers    Transfers toilet transfer     Maintains Weight-Bearing Status (Transfers) able to maintain        Sit to Stand Transfer    [place], Sit to Stand Transfer independent     Assistive Device none     Comment Steady during STS w/o LOB, no physical assist required, denies dizziness/lightheadedness, rest breaks during mobility encouraged        Stand to Sit Transfer     [place], Stand to Sit Transfer independent     Assistive Device none     Comment Steady during STS w/o LOB, no physical assist required, denies dizziness/lightheadedness, rest breaks during mobility encouraged        Toilet Transfer    Transfer Technique sit-stand;stand-sit     [place], Toilet Transfer independent     Assistive Device none;commode, 3-in-1     Comment Steady during STS to/from commode w/o LOB, no physical assist required, denies dizziness/lightheadedness, rest breaks during mobility encouraged        Balance    Static Sitting Balance WFL     Dynamic Sitting Balance WFL     Sit to Stand Dynamic Balance WFL     Static Standing Balance WFL     Dynamic Standing Balance WFL     Comment, Balance No LOBs        Motor Skills    Functional Endurance Good        Upper Body Dressing    Comment Pt educated on safe dressing strategies to dress affected UE first using unaffected UE, and undress unaffected UE first. Reminded of importance to refrain from AROM at shoulder joint; affected UE to remain static and supported. Discussed adapting activity to sitting to maximize safety and independence. Educated on donning/doffing surgical sling (see orthosis management). Verbalized understanding, pt reporting assistance is available at home.        Toileting    Comment Pt demonstrated good motor control and planning for toilet transfer using safety strategies. Scenario simulated to reflect home set up. WB precautions maintained. Verbalized understanding of acute OT education.        Orthotics & Prosthetics Management    Orthosis Location upper extremity orthosis        Upper Extremity Orthosis Management    Type (Upper Extremity Orthosis) static sling w/ abduction pillow     Fabrication Comment (Upper Extremity Orthosis) velcro, mesh, foam     Functional Design (Upper Extremity Orthosis) static orthosis     Therapeutic Indications (Upper Extremity Orthosis) pain management;post-op  positioning/protection;positioning for occupational performance/function;rest/inflammation reduction;stabilization and support     Orthosis Training (Upper Extremity Orthosis) patient;activity limitations/precautions;donning/doffing orthosis;orthosis adjustment;orthosis maintenance;purpose/goals of orthosis;able to verbalize training;able to show back training;requires assistance        AM-PAC (TM) - ADL (Current Function)    Putting on and taking off regular lower body clothing? 4 - None     Bathing? 3 - A Little     Toileting? 4 - None     Putting on/taking off regular upper body clothing? 3 - A Little     How much help for taking care of personal grooming? 3 - A Little     Eating meals? 4 - None     AM-PAC (TM) ADL Score 21        Assessment/Plan (OT)    Daily Outcome Statement OT eval complete. Pt overall Ind for functional transfers in prep for OOB ADLs. Educated on ADL adaptive/safety strategies and sling management strategies. Pt reports  can provide assist/supervision at home w/ ADLs/transfers/sling management. OT rec d/c to home w/ inc assist from spouse and progression to OP therapy. D/c OT.     Therapy Frequency evaluation only               OT Assessment/Plan    Flowsheet Row Most Recent Value   OT Recommended Discharge Disposition home with assistance, home with outpatient services at 09/08/2022 0920   Anticipated Equipment Needs At Discharge (OT) none at 09/08/2022 0920   Patient/Family Therapy Goal Statement to go home at 09/08/2022 0920                    Pt verbalizes/demos understanding: OT role/POC, safe ADL/transfer strategies, safe AD use, energy conservation, activity pacing

## 2022-09-08 NOTE — PROGRESS NOTES
Patient: Fernanda Cazares  Location:  [facility] 5PAV 5448  MRN:  [id number]  Today's date:  9/8/2022     Start of session: Pt was received supine in bed. Patient medically appropriate to participate in therapy per RN    End of session:    Patient positioned in bedside recliner with call bell in reach and chair alarm on  Pt did not report or present with any cardiac or neuro symptoms. RN aware of pts performance during therapy      Fernanda is a 82 y.o. female admitted on 9/7/2022 with Loosening of shoulder joint prosthesis, subsequent encounter [T84.038D, Z96.619]. Principal problem is Loosening of shoulder joint prosthesis, subsequent encounter.    Past Medical History  Fernanda has a past medical history of Hematuria, Hypertension, Skin cancer, and Thyroid nodule.    History of Present Illness    82 y.o. female who is status post revision right reverse shoulder arthroplasty on 9/7/22  nonWB RUE, no ROM      PT Vitals    Date/Time Pulse SpO2 Pt Activity O2 Therapy BP BP Method Pt Position [facility]   09/08/22 0730 84 98 % At rest None (Room air) 117/56 Automatic Lying LA      PT Pain    Date/Time Pain Type Side/Orientation Location Rating: Rest Rating: Activity Interventions [facility]   09/08/22 0730 Pain Assessment right arm 8 8 position adjusted LA   09/08/22 0737 Pain Assessment -- arm 7 8 -- DI          Prior Living Environment    Flowsheet Row Most Recent Value   People in Home spouse   Current Living Arrangements home   Living Environment Comment lives in a [place] and older community, 1SH, no EMI, stall shower with SC        Prior Level of Function    Flowsheet Row Most Recent Value   Dominant Hand right   Ambulation independent   Transferring independent   Toileting independent   Bathing independent   Dressing independent   Prior Level of Function Comment Pt reports she was fully independent prior to surgery   Assistive Device Currently Used at Home none           PT Evaluation and Treatment - 09/08/22 0730        PT Time  Calculation    Start Time 0730     Stop Time 0747     Time Calculation (min) 17 min        Session Details    Document Type initial evaluation     Mode of Treatment physical therapy        General Information    Patient Profile Reviewed yes     Onset of Illness/Injury or Date of Surgery 09/07/22     Referring Physician Dr Schulz     Patient/Family/Caregiver Comments/Observations RN cleared pt for therapy     General Observations of Patient Pt received supine in bed awake and alert     Existing Precautions/Restrictions fall;brace worn at all times;weight bearing;range of motion        Weight-bearing Status    Right UE Weight-Bearing Status non weight-bearing (NWB)        Cognition/Psychosocial    Affect/Mental Status (Cognition) WNL     Orientation Status (Cognition) oriented x 4     Follows Commands (Cognition) WNL     Cognitive Function WNL        Sensory    Hearing Status WFL        Vision Assessment/Intervention    Visual Impairment/Limitations corrective lenses for reading        Sensory Assessment (Somatosensory)    Sensory Assessment (Somatosensory) LE sensation intact        Range of Motion (ROM)    Range of Motion ROM is WFL;bilateral lower extremities        Strength (Manual Muscle Testing)    Strength (Manual Muscle Testing) strength is WFL;bilateral lower extremities        Bed Mobility    Bed Mobility bed mobility (all) activities     [place], Supine to Sit modified independence     Assistive Device head of bed elevated     Comment (Bed Mobility) HOB elevated, OOB to the L        Transfers    Transfers other (see comments)     Maintains Weight-Bearing Status (Transfers) able to maintain        Sit to Stand Transfer    [place], Sit to Stand Transfer independent     Assistive Device none     Comment steady upon first standing without use of AD, no episodes of LOB, denies lightheadedness upon standing        Stand to Sit Transfer    [place], Stand to Sit Transfer independent     Assistive  Device none     Comment returned to bedside recliner, good overall eccentric control        Gait Training    [place], Gait independent     Assistive Device none     Distance in Feet 100 feet     Pattern (Gait) step-through     Maintains Weight-bearing Status (Gait) able to maintain     Comment (Gait/Stairs) amb 2x50ft, steady gait, no overt LOB, reports increased pain but denies dizziness or SOB        Stairs Training    [place], Stairs other (see comments)   55 and older community, no steps       Balance    Balance Assessment sitting static balance;sit to stand dynamic balance;standing static balance;standing dynamic balance     Static Sitting Balance WFL;unsupported;sitting, edge of bed     Sit to Stand Dynamic Balance WFL;unsupported     Static Standing Balance WFL;unsupported     Dynamic Standing Balance WFL;unsupported     Comment, Balance independent for overall mobility        Motor Skills    Functional Endurance good        Therapeutic Exercise    Therapeutic Exercise upper extremity        Upper Extremity (Therapeutic Exercise)    Comment no ROM        AM-PAC (TM) - Mobility (Current Function)    Turning from your back to your side while in a flat bed without using bedrails? 4 - None     Moving from lying on your back to sitting on the side of a flat bed without using bedrails? 4 - None     Moving to and from a bed to a chair? 4 - None     Standing up from a chair using your arms? 4 - None     To walk in a hospital room? 4 - None     Climbing 3-5 steps with a railing? 4 - None     AM-PAC (TM) Mobility Score 24        Assessment/Plan (PT)    Daily Outcome Statement 9/8/22 Pt is a 82 year old female s/p R TSA post op day one. Pt seen for PT session. Pt does endorse increased pain today.Pt can complete functional tasks including performing transfers from low surface without an AD and without assist, ambulating community length distances without an AD without difficulty; no balance or coordination  deficits identified;  she was provided education on establishing a walking program when home to progress mobility- pt verbalized understanding; no further skilled PT needs identified; pt plans to return home at [place]     Therapy Frequency evaluation only               PT Assessment/Plan    Flowsheet Row Most Recent Value   PT Recommended Discharge Disposition home with assistance at 09/08/2022 0730   Anticipated Equipment Needs at Discharge (PT) none at 09/08/2022 0730                    Education Documentation  Unresolved/Worsening Symptoms, taught by Pallavi Washington PT at 9/8/2022  9:08 AM.  Learner: Patient  Readiness: Acceptance  Method: Demonstration, Explanation  Response: Verbalizes Understanding, Demonstrated Understanding  Comment: reviewed PT POC, role of PT, safety with mobility, ROM restrictions, WB restrictions    Joint Mobility/Strength, taught by Pallavi Washington PT at 9/8/2022  9:08 AM.  Learner: Patient  Readiness: Acceptance  Method: Demonstration, Explanation  Response: Verbalizes Understanding, Demonstrated Understanding  Comment: reviewed PT POC, role of PT, safety with mobility, ROM restrictions, WB restrictions    Home Safety, taught by Pallavi Washington PT at 9/8/2022  9:08 AM.  Learner: Patient  Readiness: Acceptance  Method: Demonstration, Explanation  Response: Verbalizes Understanding, Demonstrated Understanding  Comment: reviewed PT POC, role of PT, safety with mobility, ROM restrictions, WB restrictions    Energy Conservation, taught by Pallavi Washington PT at 9/8/2022  9:08 AM.  Learner: Patient  Readiness: Acceptance  Method: Demonstration, Explanation  Response: Verbalizes Understanding, Demonstrated Understanding  Comment: reviewed PT POC, role of PT, safety with mobility, ROM restrictions, WB restrictions    Assistive/Adaptive Devices, taught by Pallavi Washington PT at 9/8/2022  9:08 AM.  Learner: Patient  Readiness: Acceptance  Method: Demonstration, Explanation  Response:  Verbalizes Understanding, Demonstrated Understanding  Comment: reviewed PT POC, role of PT, safety with mobility, ROM restrictions, WB restrictions    Signs/Symptoms, taught by Pallavi Washington PT at 9/8/2022  9:08 AM.  Learner: Patient  Readiness: Acceptance  Method: Demonstration, Explanation  Response: Verbalizes Understanding, Demonstrated Understanding  Comment: reviewed PT POC, role of PT, safety with mobility, ROM restrictions, WB restrictions    Risk Factors, taught by Pallavi Washington PT at 9/8/2022  9:08 AM.  Learner: Patient  Readiness: Acceptance  Method: Demonstration, Explanation  Response: Verbalizes Understanding, Demonstrated Understanding  Comment: reviewed PT POC, role of PT, safety with mobility, ROM restrictions, WB restrictions

## 2022-09-08 NOTE — PATIENT CARE CONFERENCE
Care Progression Rounds Note  Date: 9/8/2022  Time: 10:51 AM     Patient Name: Fernanda Cazares     Medical Record Number: [id number]   YOB: 1940  Sex: Female      Room/Bed: 5448    Admitting Diagnosis: Loosening of shoulder joint prosthesis, subsequent encounter [T84.038D, Z96.619]   Admit Date/Time: 9/7/2022 10:21 AM    Primary Diagnosis: Loosening of shoulder joint prosthesis, subsequent encounter  Principal Problem: Loosening of shoulder joint prosthesis, subsequent encounter    GMLOS: pending  Anticipated Discharge Date: 9/8/2022    AM-PAC:  Mobility Score: 24    Discharge Planning:  Current Living Arrangements: home  Anticipated Discharge Disposition: home with assistance    Barriers to Discharge:  None    Comments:  home today    Participants:  advanced practice provider, physical therapy, nursing, social work/services

## 2022-09-08 NOTE — PLAN OF CARE
Problem: Adult Inpatient Plan of Care  Goal: Plan of Care Review  Outcome: Progressing  Flowsheets (Taken 9/8/2022 0851)  Plan of Care Reviewed With: patient  Outcome Summary:   9/8/22 Pt is a 82 year old female s/p R TSA post op day one. Pt seen for PT session. Pt does endorse increased pain today.Pt can complete functional tasks including performing transfers from low surface without an AD and without assist, ambulating community length distances without an AD without difficulty   no balance or coordination deficits identified   she was provided education on establishing a walking program when home to progress mobility- pt verbalized understanding   no further skilled PT needs identified   pt plans to return home at [place]

## 2022-09-08 NOTE — ANESTHESIA POSTPROCEDURE EVALUATION
Patient: Fernanda Cazares    Procedure Summary     Date: 09/07/22 Room / Location: [facility] PAV OR  / [facility] OR \[facility]\""    Anesthesia Start: 1205 Anesthesia Stop: 1557    Procedure: Right Revision To Reverse Total Shoulder Arthroplasty (Right Shoulder) Diagnosis:       Loosening of shoulder joint prosthesis, subsequent encounter      (Loosening of shoulder joint prosthesis, subsequent encounter [T84.038D, Z96.619])    Surgeons: Wade Schulz MD Responsible Provider: Barber Yanez DO    Anesthesia Type: general ASA Status: 2          Anesthesia Type: general  PACU Vitals  9/7/2022 1548 - 9/7/2022 1648      9/7/2022  1550 9/7/2022  1555 9/7/2022  1600 9/7/2022  1615    BP: -- 128/65 128/65 129/60    Temp: 36.1 °C (97 °F) -- -- --    Pulse: -- 79 80 69    Resp: -- 16 18 30    SpO2: -- 100 % 99 % 99 %              9/7/2022  1630 9/7/2022  1645          BP: 126/62 132/63      Temp: -- --      Pulse: 70 72      Resp: 23 19      SpO2: 99 % 95 %              Anesthesia Post Evaluation    Pain score: 1  Pain management: adequate  Mode of pain management: IV medication and additional block medications  Patient location during evaluation: PACU  Patient participation: complete - patient participated  Level of consciousness: awake and alert  Cardiovascular status: acceptable  Airway Patency: adequate  Respiratory status: acceptable  Hydration status: acceptable  Anesthetic complications: no

## 2022-09-08 NOTE — PROGRESS NOTES
Orthopaedic Progress Note    [S]   No acute events overnight. Pain well controlled.    [O]  Vitals:    09/08/22 0456   BP: (!) 108/52   Pulse: 83   Resp: 16   Temp: 36.8 °C (98.2 °F)   SpO2: 96%         SILT  +radial  Dressing C/d/i  +AIN/PIN/R/U      [A/P]   82 y.o. female status post right revision reverse total shoulder, 1 Day Post-Op doing well post-operatively    - Pain Control  - PT/OT  - DVT Prophylaxis  - F/u OR cx - ngtd    Pablo Lin MD

## 2022-09-17 LAB
GRAM STN SPEC: NORMAL
GRAM STN SPEC: NORMAL
MICROORGANISM SPEC CULT: NORMAL

## 2022-09-20 LAB
GRAM STN SPEC: NORMAL
GRAM STN SPEC: NORMAL
MICROORGANISM SPEC CULT: NORMAL

## 2022-09-21 LAB
GRAM STN SPEC: NORMAL
MICROORGANISM SPEC CULT: NORMAL

## 2022-09-28 ENCOUNTER — APPOINTMENT (OUTPATIENT)
Dept: LAB | Facility: CLINIC | Age: 82
End: 2022-09-28
Payer: COMMERCIAL

## 2022-09-28 DIAGNOSIS — I10 ESSENTIAL HYPERTENSION: ICD-10-CM

## 2022-09-28 DIAGNOSIS — E87.1 HYPONATREMIA: ICD-10-CM

## 2022-09-28 LAB
ALBUMIN SERPL BCP-MCNC: 3.7 G/DL (ref 3.5–5)
ALP SERPL-CCNC: 106 U/L (ref 46–116)
ALT SERPL W P-5'-P-CCNC: 25 U/L (ref 12–78)
ANION GAP SERPL CALCULATED.3IONS-SCNC: 6 MMOL/L (ref 4–13)
AST SERPL W P-5'-P-CCNC: 16 U/L (ref 5–45)
BASOPHILS # BLD AUTO: 0.02 THOUSANDS/ΜL (ref 0–0.1)
BASOPHILS NFR BLD AUTO: 0 % (ref 0–1)
BILIRUB SERPL-MCNC: 0.33 MG/DL (ref 0.2–1)
BUN SERPL-MCNC: 20 MG/DL (ref 5–25)
CALCIUM SERPL-MCNC: 9.7 MG/DL (ref 8.3–10.1)
CHLORIDE SERPL-SCNC: 99 MMOL/L (ref 96–108)
CHOLEST SERPL-MCNC: 150 MG/DL
CO2 SERPL-SCNC: 26 MMOL/L (ref 21–32)
CREAT SERPL-MCNC: 0.58 MG/DL (ref 0.6–1.3)
EOSINOPHIL # BLD AUTO: 0.24 THOUSAND/ΜL (ref 0–0.61)
EOSINOPHIL NFR BLD AUTO: 5 % (ref 0–6)
ERYTHROCYTE [DISTWIDTH] IN BLOOD BY AUTOMATED COUNT: 12.8 % (ref 11.6–15.1)
GFR SERPL CREATININE-BSD FRML MDRD: 87 ML/MIN/1.73SQ M
GLUCOSE P FAST SERPL-MCNC: 85 MG/DL (ref 65–99)
HCT VFR BLD AUTO: 36.4 % (ref 34.8–46.1)
HDLC SERPL-MCNC: 77 MG/DL
HGB BLD-MCNC: 12.1 G/DL (ref 11.5–15.4)
IMM GRANULOCYTES # BLD AUTO: 0.02 THOUSAND/UL (ref 0–0.2)
IMM GRANULOCYTES NFR BLD AUTO: 0 % (ref 0–2)
LDLC SERPL CALC-MCNC: 63 MG/DL (ref 0–100)
LYMPHOCYTES # BLD AUTO: 1.57 THOUSANDS/ΜL (ref 0.6–4.47)
LYMPHOCYTES NFR BLD AUTO: 34 % (ref 14–44)
MCH RBC QN AUTO: 32.4 PG (ref 26.8–34.3)
MCHC RBC AUTO-ENTMCNC: 33.2 G/DL (ref 31.4–37.4)
MCV RBC AUTO: 98 FL (ref 82–98)
MONOCYTES # BLD AUTO: 0.51 THOUSAND/ΜL (ref 0.17–1.22)
MONOCYTES NFR BLD AUTO: 11 % (ref 4–12)
NEUTROPHILS # BLD AUTO: 2.28 THOUSANDS/ΜL (ref 1.85–7.62)
NEUTS SEG NFR BLD AUTO: 50 % (ref 43–75)
NRBC BLD AUTO-RTO: 0 /100 WBCS
PLATELET # BLD AUTO: 435 THOUSANDS/UL (ref 149–390)
PMV BLD AUTO: 8.8 FL (ref 8.9–12.7)
POTASSIUM SERPL-SCNC: 4.7 MMOL/L (ref 3.5–5.3)
PROT SERPL-MCNC: 6.7 G/DL (ref 6.4–8.4)
RBC # BLD AUTO: 3.73 MILLION/UL (ref 3.81–5.12)
SODIUM SERPL-SCNC: 131 MMOL/L (ref 135–147)
TRIGL SERPL-MCNC: 52 MG/DL
TSH SERPL DL<=0.05 MIU/L-ACNC: 2.11 UIU/ML (ref 0.45–4.5)
WBC # BLD AUTO: 4.64 THOUSAND/UL (ref 4.31–10.16)

## 2022-09-28 PROCEDURE — 80061 LIPID PANEL: CPT

## 2022-09-28 PROCEDURE — 80053 COMPREHEN METABOLIC PANEL: CPT

## 2022-09-28 PROCEDURE — 85025 COMPLETE CBC W/AUTO DIFF WBC: CPT

## 2022-09-28 PROCEDURE — 36415 COLL VENOUS BLD VENIPUNCTURE: CPT

## 2022-09-28 PROCEDURE — 84443 ASSAY THYROID STIM HORMONE: CPT

## 2022-10-03 ENCOUNTER — OFFICE VISIT (OUTPATIENT)
Dept: FAMILY MEDICINE CLINIC | Facility: CLINIC | Age: 82
End: 2022-10-03
Payer: COMMERCIAL

## 2022-10-03 VITALS
HEART RATE: 85 BPM | SYSTOLIC BLOOD PRESSURE: 134 MMHG | WEIGHT: 117 LBS | BODY MASS INDEX: 19.97 KG/M2 | RESPIRATION RATE: 16 BRPM | HEIGHT: 64 IN | DIASTOLIC BLOOD PRESSURE: 80 MMHG

## 2022-10-03 DIAGNOSIS — E87.1 HYPONATREMIA: ICD-10-CM

## 2022-10-03 DIAGNOSIS — Z12.31 ENCOUNTER FOR SCREENING MAMMOGRAM FOR MALIGNANT NEOPLASM OF BREAST: ICD-10-CM

## 2022-10-03 DIAGNOSIS — Z96.611 HISTORY OF REVISION OF TOTAL REPLACEMENT OF RIGHT SHOULDER JOINT: ICD-10-CM

## 2022-10-03 DIAGNOSIS — I10 ESSENTIAL HYPERTENSION: Primary | ICD-10-CM

## 2022-10-03 DIAGNOSIS — D75.839 THROMBOCYTOSIS: ICD-10-CM

## 2022-10-03 DIAGNOSIS — M81.0 AGE-RELATED OSTEOPOROSIS WITHOUT CURRENT PATHOLOGICAL FRACTURE: ICD-10-CM

## 2022-10-03 PROCEDURE — 99214 OFFICE O/P EST MOD 30 MIN: CPT | Performed by: FAMILY MEDICINE

## 2022-10-03 RX ORDER — ASPIRIN 81 MG/1
81 TABLET, CHEWABLE ORAL DAILY
COMMUNITY

## 2022-10-03 NOTE — PROGRESS NOTES
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  - follow up with [id number]     Return in 6 months ro sooner as needed  The patient understands and agrees with the treatment plan  Subjective:   Chief Complaint   Patient presents with    Hypertension      Patient ID: Blu Bass is a [de-identified] y o  female who presents today for follow up hypertension  Patient recently underwent right shoulder surgery: revision to reverse total shoulder arthroplasty on 9/7/22 at [facility] and overall doing better, her pain is well controlled with Tylenol, she is wearing a sling and is scheduled for follow up with Ortho on 10/14/22  Patient offers no other complaints, she is adherent to her lisinopril       The following portions of the patient's history were reviewed and updated as appropriate: allergies, current medications, past family history, past medical history, past social history, past surgical history and problem list     Past Medical History:   Diagnosis Date    Arthritis     Disease of thyroid gland     thyroid nodules    Dry eye     Hard of hearing     Hematuria     Hypertension     Leaking of urine     Osteoarthritis     Peritonitis ([address] )     in age 44's due to ruptured apendix    Shoulder joint pain     right- reverse arthroplasty today 8/25/2020    Squamous cell skin cancer     Wears glasses     Wears partial dentures     upper     Past Surgical History: Procedure Laterality Date    APPENDECTOMY      BREAST BIOPSY Right 02/15/2021    stereo, benign    CHOLECYSTECTOMY LAPAROSCOPIC      COLONOSCOPY      HIP SURGERY      JOINT REPLACEMENT Right     hip    LAPAROSCOPIC CHOLECYSTECTOMY      LAPAROSCOPY      MAMMO STEREOTACTIC BREAST BIOPSY RIGHT (ALL INC) Right 2/15/2021    [place] RECONSTR TOTAL SHOULDER IMPLANT Right 2020    Procedure: ARTHROPLASTY SHOULDER REVERSE;  Surgeon: Gregory Jack MD;  Location: [facility];  Service: Orthopedics    TONSILLECTOMY      TUBAL LIGATION      WISDOM TOOTH EXTRACTION       Family History   Problem Relation Age of Onset    Hypertension Mother             Coronary artery disease Father     Hypertension Father             Hyperlipidemia Father     No Known Problems Brother     No Known Problems Daughter     No Known Problems Son     No Known Problems Maternal Grandmother     No Known Problems Maternal Grandfather     No Known Problems Paternal Grandmother     No Known Problems Paternal Grandfather     Alcohol abuse Neg Hx     Substance Abuse Neg Hx     Mental illness Neg Hx      Social History     Socioeconomic History    Marital status: /Civil Union     Spouse name: Not on file    Number of children: Not on file    Years of education: Not on file    Highest education level: Not on file   Occupational History    Not on file   Tobacco Use    Smoking status: Former Smoker     Packs/day: 0 25     Years: 10 00     Pack years: 2 50     Quit date:      Years since quittin 7    Smokeless tobacco: Never Used   Vaping Use    Vaping Use: Never used   Substance and Sexual Activity    Alcohol use:  Yes     Alcohol/week: 1 0 standard drink     Types: 1 Glasses of wine per week    Drug use: Never    Sexual activity: Not Currently     Partners: Male     Birth control/protection: None   Other Topics Concern    Not on file   Social History Narrative    Not on file     Social Determinants of Health     Financial Resource Strain: Not on file   Food Insecurity: Not on file   Transportation Needs: Not on file   Physical Activity: Not on file   Stress: Not on file   Social Connections: Not on file   Intimate Partner Violence: Not on file   Housing Stability: Not on file       Current Outpatient Medications:     aspirin 81 mg chewable tablet, Chew 81 mg daily, Disp: , Rfl:     Calcium Carbonate-Vit D-Min (CALCIUM 1200 PO), Take by mouth daily, Disp: , Rfl:     cephalexin (KEFLEX) 500 mg capsule, TAKE 4 CAPSULES (500 MG) 1 HOUR PRIOR TO DENTAL PROCEDURE, Disp: , Rfl:     Cholecalciferol (VITAMIN D) 50 MCG (2000 UT) tablet, Take 2,000 Units by mouth daily , Disp: , Rfl:     lisinopril (ZESTRIL) 40 mg tablet, Take 1 tablet (40 mg total) by mouth daily, Disp: 90 tablet, Rfl: 3    Multiple Vitamins-Minerals (MULTIVITAMIN WOMEN PO), Take 1 tablet by mouth daily , Disp: , Rfl:     estradiol (ESTRACE) 0 1 mg/g vaginal cream, Insert 1 g into the vagina 2 (two) times a week (Patient not taking: Reported on 10/3/2022), Disp: 42 5 g, Rfl: 0    Review of Systems   Constitutional: Negative for appetite change, chills, fatigue, fever and unexpected weight change  Respiratory: Negative for cough, shortness of breath and wheezing  Cardiovascular: Negative for chest pain, palpitations and leg swelling  Gastrointestinal: Negative for abdominal pain, blood in stool, constipation, diarrhea, nausea and vomiting  Genitourinary: Negative for dysuria, flank pain, frequency, pelvic pain and urgency  Musculoskeletal:        Right shoulder pain   Neurological: Negative for dizziness, syncope, weakness, numbness and headaches  Hematological: Negative for adenopathy  Psychiatric/Behavioral: Negative for dysphoric mood and sleep disturbance  The patient is not nervous/anxious            Objective:    Vitals:    10/03/22 1109 10/03/22 1155   BP: 140/90 134/80   BP Location:  Left arm   Patient Position: Sitting   Cuff Size:  Standard   Pulse: 85    Resp: 16    Weight: 53 1 kg (117 lb)    Height: 5' 3 5" (1 613 m)         Physical Exam  Constitutional:       General: She is not in acute distress  Appearance: She is well-developed  Cardiovascular:      Rate and Rhythm: Normal rate and regular rhythm  Heart sounds: Normal heart sounds  No murmur heard  Pulmonary:      Effort: Pulmonary effort is normal  No respiratory distress  Breath sounds: Normal breath sounds  No wheezing, rhonchi or rales  Abdominal:      General: There is no distension  Palpations: Abdomen is soft  There is no mass  Tenderness: There is no abdominal tenderness  Musculoskeletal:      Cervical back: Neck supple  Right lower leg: No edema  Left lower leg: No edema  Lymphadenopathy:      Cervical: No cervical adenopathy  Neurological:      Mental Status: She is alert and oriented to person, place, and time  Psychiatric:         Mood and Affect: Mood normal          Behavior: Behavior normal          Thought Content:  Thought content normal         Lab Results   Component Value Date    CHOLESTEROL 150 09/28/2022    CHOLESTEROL 196 09/29/2021     Lab Results   Component Value Date    HDL 77 09/28/2022    HDL 99 09/29/2021     Lab Results   Component Value Date    TRIG 52 09/28/2022    TRIG 52 09/29/2021     Lab Results   Component Value Date    LDLCALC 63 09/28/2022     Lab Results   Component Value Date    SODIUM 131 (L) 09/28/2022    K 4 7 09/28/2022    CL 99 09/28/2022    CO2 26 09/28/2022    AGAP 6 09/28/2022    BUN 20 09/28/2022    CREATININE 0 58 (L) 09/28/2022    GLUC 101 09/03/2022    GLUF 85 09/28/2022    CALCIUM 9 7 09/28/2022    AST 16 09/28/2022    ALT 25 09/28/2022    ALKPHOS 106 09/28/2022    TP 6 7 09/28/2022    TBILI 0 33 09/28/2022    EGFR 87 09/28/2022     Lab Results   Component Value Date    WBC 4 64 09/28/2022    HGB 12 1 09/28/2022    HCT 36 4 09/28/2022    MCV 98 09/28/2022    PLT 435 (H) 09/28/2022     Lab Results   Component Value Date    NLC4WAMNCLLH 2 110 09/28/2022

## 2022-10-05 LAB
FUNGUS SPEC CULT: NORMAL

## 2022-10-20 LAB
MYCOBACTERIUM SPEC CULT: NORMAL

## 2022-10-31 ENCOUNTER — EVALUATION (OUTPATIENT)
Dept: PHYSICAL THERAPY | Facility: CLINIC | Age: 82
End: 2022-10-31

## 2022-10-31 DIAGNOSIS — Z96.611 HISTORY OF REVISION OF TOTAL REPLACEMENT OF RIGHT SHOULDER JOINT: Primary | ICD-10-CM

## 2022-10-31 NOTE — PROGRESS NOTES
PT Evaluation     Today's date: 10/31/2022  Patient name: Nora Bradley  : 1940  MRN: [id number]  Referring provider: Zuri Crouch MD  Dx:   Encounter Diagnosis     ICD-10-CM    1  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  Z96 611                   Assessment  Assessment details: Nora Bradley is a [de-identified] y o  female who presents s/p R total shoulder revision  Pt has decreased R UE strength and ROM  Pt currently requires modified ADLs, has interrupted sleep, and is unable to reach to shoulder level  Pt would benefit from skilled PT to address the above impairments and to return to pain free function  Impairments: abnormal or restricted ROM, impaired physical strength, lacks appropriate home exercise program and pain with function  Functional limitations: modified ADLs, interrupted sleep, unable to reach to shoulder level  Symptom irritability: moderateUnderstanding of Dx/Px/POC: good   Prognosis: good    Goals  1  Pt will be independent with HEP upon DC  2  Pt's R UE ROM will improve by at least 25% to allow for reaching to shoulder level  3  Pt's R UE strength will be at least 4/5 t/o to allow for completing ADL's with ease  4  Pt's pain will be no more than 2/10 to allow for uninterrupted sleep  Plan  Patient would benefit from: skilled physical therapy  Planned modality interventions: low level laser therapy  Planned therapy interventions: joint mobilization, manual therapy, neuromuscular re-education, patient education, therapeutic activities, therapeutic exercise, strengthening and home exercise program  Frequency: 2x week  Duration in visits: 12  Duration in weeks: 6  Treatment plan discussed with: patient        Subjective Evaluation    History of Present Illness  Date of surgery: 2022  Mechanism of injury: surgery  Mechanism of injury: Pt underwent a revision of R total shoulder with a bone graft in September due to failed hardware   Pt was placed in a sling for 6 weeks  Pt had x-rays performed which showed healing  Pt presents to OP PT            Not a recurrent problem   Quality of life: good    Pain  Current pain ratin  At best pain ratin  At worst pain ratin  Location: R shoulder  Quality: dull ache  Relieving factors: rest  Progression: no change    Hand dominance: right      Diagnostic Tests  X-ray: normal  Treatments  No previous or current treatments  Patient Goals  Patient goals for therapy: decreased pain, increased strength and independence with ADLs/IADLs          Objective     Active Range of Motion     Right Shoulder   Flexion: 70 degrees   Abduction: 70 degrees   External rotation 45°: 40 degrees     Passive Range of Motion     Right Shoulder   Flexion: 100 degrees   Abduction: 95 degrees   External rotation 45°: 50 degrees     Strength/Myotome Testing     Additional Strength Details  Not tested due to restrictions in protocol             Precautions: PROM only (flex 160/ER 40)until  then add AAROM      Manuals 10/31            PROM per protocol MD                                                   Neuro Re-Ed                                                                                                        Ther Ex                                                                                                                     Ther Activity                                       Gait Training                                       Modalities

## 2022-10-31 NOTE — LETTER
2022    Magui Joseph, [address]  [address] [place] [id number]    Patient: Valeriano Shane   YOB: 1940   Date of Visit: 10/31/2022     Encounter Diagnosis     ICD-10-CM    1  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  Z96 611        Dear Dr Weiss Counts: Thank you for your recent referral of Valeriano Shane  Please review the attached evaluation summary from [facility] recent visit  Please verify that you agree with the plan of care by signing the attached order  If you have any questions or concerns, please do not hesitate to call  I sincerely appreciate the opportunity to share in the care of one of your patients and hope to have another opportunity to work with you in the near future  Sincerely,    Maribell Banks, PT      Referring Provider:      I certify that I have read the below Plan of Care and certify the need for these services furnished under this plan of treatment while under my care  Magui Joseph MD  [address] [place], [place] [id number]  Via Fax: [phone]          PT Evaluation     Today's date: 10/31/2022  Patient name: Valeriano Shane  : 1940  MRN: [id number]  Referring provider: Aida Ott MD  Dx:   Encounter Diagnosis     ICD-10-CM    1  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  Z96 611                   Assessment  Assessment details: Valeriano Shane is a [de-identified] y o  female who presents s/p R total shoulder revision  Pt has decreased R UE strength and ROM  Pt currently requires modified ADLs, has interrupted sleep, and is unable to reach to shoulder level  Pt would benefit from skilled PT to address the above impairments and to return to pain free function      Impairments: abnormal or restricted ROM, impaired physical strength, lacks appropriate home exercise program and pain with function  Functional limitations: modified ADLs, interrupted sleep, unable to reach to shoulder level  Symptom irritability: moderateUnderstanding of Dx/Px/POC: good   Prognosis: good    Goals  1  Pt will be independent with HEP upon DC  2  Pt's R UE ROM will improve by at least 25% to allow for reaching to shoulder level  3  Pt's R UE strength will be at least 4/5 t/o to allow for completing ADL's with ease  4  Pt's pain will be no more than 2/10 to allow for uninterrupted sleep  Plan  Patient would benefit from: skilled physical therapy  Planned modality interventions: low level laser therapy  Planned therapy interventions: joint mobilization, manual therapy, neuromuscular re-education, patient education, therapeutic activities, therapeutic exercise, strengthening and home exercise program  Frequency: 2x week  Duration in visits: 12  Duration in weeks: 6  Treatment plan discussed with: patient        Subjective Evaluation    History of Present Illness  Date of surgery: 2022  Mechanism of injury: surgery  Mechanism of injury: Pt underwent a revision of R total shoulder with a bone graft in September due to failed hardware  Pt was placed in a sling for 6 weeks  Pt had x-rays performed which showed healing  Pt presents to OP PT            Not a recurrent problem   Quality of life: good    Pain  Current pain ratin  At best pain ratin  At worst pain ratin  Location: R shoulder  Quality: dull ache  Relieving factors: rest  Progression: no change    Hand dominance: right      Diagnostic Tests  X-ray: normal  Treatments  No previous or current treatments  Patient Goals  Patient goals for therapy: decreased pain, increased strength and independence with ADLs/IADLs          Objective     Active Range of Motion     Right Shoulder   Flexion: 70 degrees   Abduction: 70 degrees   External rotation 45°: 40 degrees     Passive Range of Motion     Right Shoulder   Flexion: 100 degrees   Abduction: 95 degrees   External rotation 45°: 50 degrees     Strength/Myotome Testing Additional Strength Details  Not tested due to restrictions in protocol             Precautions: PROM only (flex 160/ER 40)until 11/14 then add AAROM      Manuals 10/31            PROM per protocol MD                                                   Neuro Re-Ed                                                                                                        Ther Ex                                                                                                                     Ther Activity                                       Gait Training                                       Modalities

## 2022-11-03 ENCOUNTER — OFFICE VISIT (OUTPATIENT)
Dept: PHYSICAL THERAPY | Facility: CLINIC | Age: 82
End: 2022-11-03

## 2022-11-03 DIAGNOSIS — Z96.611 HISTORY OF REVISION OF TOTAL REPLACEMENT OF RIGHT SHOULDER JOINT: Primary | ICD-10-CM

## 2022-11-03 NOTE — PROGRESS NOTES
Daily Note     Today's date: 11/3/2022  Patient name: Tiffanie Sinha  : 1940  MRN: [id number]  Referring provider: Shahid Sheridan MD  Dx:   Encounter Diagnosis     ICD-10-CM    1  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  Z96 611                   Subjective: "It's okay, it aches in the morning when I get up "    Objective: See treatment diary below    Assessment: PROM to R shoulder, per protocol  Able to thom same  Will monitor  Plan: Cont per POC        Precautions: PROM only (flex 160/ER 40)until  then add AAROM      Manuals 10/31 11/3           PROM per protocol MD CASTELLANOS                                                  Neuro Re-Ed                                                                                                        Ther Ex                                                                                                                     Ther Activity                                       Gait Training                                       Modalities

## 2022-11-08 ENCOUNTER — OFFICE VISIT (OUTPATIENT)
Dept: PHYSICAL THERAPY | Facility: CLINIC | Age: 82
End: 2022-11-08

## 2022-11-08 DIAGNOSIS — Z96.611 HISTORY OF REVISION OF TOTAL REPLACEMENT OF RIGHT SHOULDER JOINT: Primary | ICD-10-CM

## 2022-11-08 NOTE — PROGRESS NOTES
Daily Note     Today's date: 2022  Patient name: Jamie Nayak  : 1940  MRN: [id number]  Referring provider: Jefry Padilla MD  Dx:   Encounter Diagnosis     ICD-10-CM    1  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  Z96 611                   Subjective: Pt reports her shoulder feels achy when she wakes up in the morning  Objective: See treatment diary below    Assessment: PROM to R shoulder per protocol  Mild discomfort twice when pt did some mm guarding  Will monitor  Plan: Cont per POC      Precautions: PROM only (flex 160/ER 40)until  then add AAROM      Manuals 10/31 11/3 11/8          PROM per protocol MD CASTELLANOS MO                                                 Neuro Re-Ed                                                                                                        Ther Ex                                                                                                                     Ther Activity                                       Gait Training                                       Modalities

## 2022-11-11 ENCOUNTER — OFFICE VISIT (OUTPATIENT)
Dept: PHYSICAL THERAPY | Facility: CLINIC | Age: 82
End: 2022-11-11

## 2022-11-11 DIAGNOSIS — Z96.611 HISTORY OF REVISION OF TOTAL REPLACEMENT OF RIGHT SHOULDER JOINT: Primary | ICD-10-CM

## 2022-11-11 NOTE — PROGRESS NOTES
Daily Note     Today's date: 2022  Patient name: Constanza Ga  : 1940  MRN: [id number]  Referring provider: Oswaldo Choi MD  Dx:   Encounter Diagnosis     ICD-10-CM    1  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  Z96 611                   Subjective: Pt reports her R shoulder was sore yesterday, as she drove for an hour, with her R hand on the steering wheel  Notes she did not use her R arm to steer  Reports cont to feel achy when she wakes up in the morning  Objective: See treatment diary below    Assessment: PROM to R shoulder per protocol  Pt's shoulder was tighter today  Will monitor  Progress NV per protocol  Plan: Cont per POC      Precautions: PROM only (flex 160/ER 40)until  then add AAROM      Manuals 10/31 11/3 11/8 11/11         PROM per protocol MD CASTELLANOS MO MO                                                Neuro Re-Ed                                                                                                        Ther Ex                                                                                                                     Ther Activity                                       Gait Training                                       Modalities

## 2022-11-15 ENCOUNTER — OFFICE VISIT (OUTPATIENT)
Dept: PHYSICAL THERAPY | Facility: CLINIC | Age: 82
End: 2022-11-15

## 2022-11-15 DIAGNOSIS — Z96.611 HISTORY OF REVISION OF TOTAL REPLACEMENT OF RIGHT SHOULDER JOINT: Primary | ICD-10-CM

## 2022-11-15 NOTE — PROGRESS NOTES
Daily Note     Today's date: 2022  Patient name: Pj Ng  : 1940  MRN: [id number]  Referring provider: Kimberly Parr MD  Dx:   Encounter Diagnosis     ICD-10-CM    1  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  Z96 611                   Subjective: Pt reports her R shoulder was sore yesterday, as she drove for an hour, with her R hand on the steering wheel  Notes she did not use her R arm to steer  Reports cont to feel achy when she wakes up in the morning  Objective: See treatment diary below    Assessment: PROM to R shoulder per protocol  Initiated AAROM exercises,  table slides and finger ladder as below, fatigue after same  Will monitor  Plan: Cont per POC      Precautions: PROM only (flex 160/ER 40)until  then add AAROM      Manuals 10/31 11/3 11/8 11/11         PROM per protocol MD CASTELLANOS MO MO                                                Neuro Re-Ed                                                                                                        Ther Ex             Table slides    5"x10  flex         Finger ladder    5"x5 flex         Pulleys                                                                              Ther Activity                                       Gait Training                                       Modalities

## 2022-11-18 ENCOUNTER — OFFICE VISIT (OUTPATIENT)
Dept: PHYSICAL THERAPY | Facility: CLINIC | Age: 82
End: 2022-11-18

## 2022-11-18 DIAGNOSIS — Z96.611 HISTORY OF REVISION OF TOTAL REPLACEMENT OF RIGHT SHOULDER JOINT: Primary | ICD-10-CM

## 2022-11-18 NOTE — PROGRESS NOTES
Daily Note     Today's date: 2022  Patient name: Benji Solis  : 1940  MRN: [id number]  Referring provider: Yuridia Chavez MD  Dx:   Encounter Diagnosis     ICD-10-CM    1  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  Z96 611                   Subjective: Pt reports her R shoulder was sore yesterday, as she drove for an hour, with her R hand on the steering wheel  Notes she did not use her R arm to steer  Reports cont to feel achy when she wakes up in the morning  Objective: See treatment diary below    Assessment: PROM to R shoulder per protocol  Initiated AAROM exercises,  table slides and finger ladder as below, fatigue after same  Will monitor  Plan: Cont per POC      Precautions: PROM only (flex 160/ER 40)until  then add AAROM      Manuals 10/31 11/3 11/8 11/11 11/15        PROM per protocol MD MO MO MO MO                                               Neuro Re-Ed                                                                                                        Ther Ex             Table slides     5"x10  flex        Finger ladder     5"x5 flex        Pulleys                                                                              Ther Activity                                       Gait Training                                       Modalities

## 2022-11-18 NOTE — PROGRESS NOTES
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  Z96 611                   Subjective: Pt reports her R shoulder was sore yesterday, as she drove for an hour, with her R hand on the steering wheel  Notes she did not use her R arm to steer  Reports cont to feel achy when she wakes up in the morning  Objective: See treatment diary below    Assessment: PROM to R shoulder per protocol  Initiated AAROM exercises,  table slides and finger ladder as below, fatigue after same  Will monitor  Plan: Cont per POC  Precautions: PROM only (flex 160/ER 40)until  then add AAROM      Manuals 10/31 11/3 11/8 11/11         PROM per protocol MD CASTELLANOS MO MO                                                Neuro Re-Ed                                                                                                        Ther Ex             Table slides    5"x10  flex         Finger ladder    5"x5 flex         Pulleys                                                                              Ther Activity                                       Gait Training                                       Modalities                                          Daily Note     Today's date: 2022  Patient name: Dani Lomeli  : 1940  MRN: [id number]  Referring provider: Zina Pandya MD  Dx:   Encounter Diagnosis     ICD-10-CM    1  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  Z96 611                   Subjective: Pt reports her R shoulder was sore yesterday, as she drove for an hour, with her R hand on the steering wheel  Notes she did not use her R arm to steer  Reports cont to feel achy when she wakes up in the morning      Objective: See treatment diary below    Assessment: Finger ladder was challenging, added scap table slides  PROM to R shoulder per protocol  Will monitor  Cont therapy to increase ROM  Plan: Cont per POC      Precautions: PROM only (flex 160/ER 40)until 11/14 then add AAROM      Manuals 10/31 11/3 11/8 11/11  11/18       PROM per protocol MD MO MO MO  MO                                              Neuro Re-Ed                                                                                                        Ther Ex             Table slides    5"x10  flex  5"x10ea  Flex/scap       Finger ladder    5"x5 flex  5"x5       Pulleys                                                                              Ther Activity                                       Gait Training                                       Modalities

## 2022-11-21 DIAGNOSIS — N95.2 VAGINAL ATROPHY: ICD-10-CM

## 2022-11-21 RX ORDER — ESTRADIOL 0.1 MG/G
1 CREAM VAGINAL 2 TIMES WEEKLY
Qty: 42.5 G | Refills: 0 | Status: SHIPPED | OUTPATIENT
Start: 2022-11-21 | End: 2023-02-19

## 2022-11-22 ENCOUNTER — OFFICE VISIT (OUTPATIENT)
Dept: PHYSICAL THERAPY | Facility: CLINIC | Age: 82
End: 2022-11-22

## 2022-11-22 ENCOUNTER — APPOINTMENT (OUTPATIENT)
Dept: RADIOLOGY | Facility: CLINIC | Age: 82
End: 2022-11-22

## 2022-11-22 DIAGNOSIS — Z96.611 PRESENCE OF RIGHT ARTIFICIAL SHOULDER JOINT: ICD-10-CM

## 2022-11-22 DIAGNOSIS — Z96.649 ASEPTIC LOOSENING OF PROSTHETIC HIP, SUBSEQUENT ENCOUNTER: ICD-10-CM

## 2022-11-22 DIAGNOSIS — T84.038D ASEPTIC LOOSENING OF PROSTHETIC HIP, SUBSEQUENT ENCOUNTER: ICD-10-CM

## 2022-11-22 DIAGNOSIS — Z96.611 HISTORY OF REVISION OF TOTAL REPLACEMENT OF RIGHT SHOULDER JOINT: Primary | ICD-10-CM

## 2022-11-22 NOTE — PROGRESS NOTES
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  Z96 611           Start Time: 1234  Stop Time: 1310  Total time in clinic (min): 36 minutes      Subjective: Patient reports she had an x-ray of her right shoulder done this morning, and it's feeling "a little sore" since this  Patient reports she will be traveling to [place] on 22 and is concerned about going through security and needing to raise her right arm overhead  Objective: See treatment diary below  Assessment: Right shoulder PROM is progressing appropriately per post op protocol  Encouraged patient to continue performing table slides at home  Patient would benefit from continued PT to restore functional right shoulder ROM and strength  Plan: Continue treatment as per PT plan of care         Precautions: PROM only (flex 160/ER 40) until  then add AAROM      Manuals 10/31 11/3 11/8 11/11 11/18 11/22       PROM per protocol MD MO MO MO MO JLW                                              Neuro Re-Ed                                                                                                        Ther Ex             Table slides    5"x10  flex 5"x10ea  Flex/  scap flex, scap  5"x10 ea       Finger ladder    5"x5 flex 5"x5 5"x10       Pulleys                                                                              Ther Activity                                       Gait Training                                       Modalities             CP post tx      8'

## 2022-11-25 ENCOUNTER — OFFICE VISIT (OUTPATIENT)
Dept: PHYSICAL THERAPY | Facility: CLINIC | Age: 82
End: 2022-11-25

## 2022-11-25 DIAGNOSIS — Z96.611 HISTORY OF REVISION OF TOTAL REPLACEMENT OF RIGHT SHOULDER JOINT: Primary | ICD-10-CM

## 2022-11-25 NOTE — PROGRESS NOTES
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  Z96 611                      Subjective: Pt reports that she did nothing all day yesterday however had a lot of discomfort at night  Objective: See treatment diary below      Assessment: Pt had good tolerance to progression of program  Substantial tightness into flexion and ER with PROM  Plan: Continue per plan of care        Precautions: PROM only until  then add AAROM      Manuals 10/31 11/3 11/8 11/11 11/18 11/22 11/25      PROM per protocol MD MO MO MO MO JLW MD                                             Neuro Re-Ed             Cane ext with scap sq                                                                                           Ther Ex             Table slides    5"x10  flex 5"x10ea  Flex/  scap flex, scap  5"x10 ea Wall slides 10x10"      Finger ladder    5"x5 flex 5"x5 5"x10 x15      Pulleys       Flex 10x10"      Supine cane AAROM (flex/ER)                                                                 Ther Activity                                       Gait Training                                       Modalities             CP post tx      8'

## 2022-11-28 ENCOUNTER — OFFICE VISIT (OUTPATIENT)
Dept: PHYSICAL THERAPY | Facility: CLINIC | Age: 82
End: 2022-11-28

## 2022-11-28 DIAGNOSIS — Z96.611 HISTORY OF REVISION OF TOTAL REPLACEMENT OF RIGHT SHOULDER JOINT: Primary | ICD-10-CM

## 2022-11-28 NOTE — PROGRESS NOTES
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  Z96 611                      Subjective: Pt reports her R shoulder was painful on Saturday, 6/10, used ice and took Aleve at bedtime  Notes  pain level was 6/10, was making pumpkin bread  Today pain level upon arrival to therapy is 2/10  Objective: See treatment diary below    Assessment: Performed exercise program as tolerated  Stressed to pt to keep exercises pain free  PROM to R shoulder per protocol  Concluded w/CP x10'  Will monitor  Plan: Cont per POC       Precautions: PROM only until  then add AAROM      Manuals 10/31 11/3 11/8 11/11 11/18 11/22 11/25 11/29     PROM per protocol MD CASTELLANOS MO MO MO JLW MD MO                                            Neuro Re-Ed             Cane ext with scap sq                                                                                           Ther Ex             Table slides    5"x10  flex 5"x10ea  Flex/  scap flex, scap  5"x10 ea Wall slides 10x10" Wall slides  10x10"     Finger ladder    5"x5 flex 5"x5 5"x10 x15 x15     Pulleys       Flex 10x10" Flex 10x7"     Supine cane AAROM (flex/ER)                                                                 Ther Activity                                       Gait Training                                       Modalities             CP post tx      8'

## 2022-11-29 ENCOUNTER — OFFICE VISIT (OUTPATIENT)
Dept: DERMATOLOGY | Facility: CLINIC | Age: 82
End: 2022-11-29

## 2022-11-29 VITALS — TEMPERATURE: 97.8 F | BODY MASS INDEX: 19.63 KG/M2 | WEIGHT: 115 LBS | HEIGHT: 64 IN

## 2022-11-29 DIAGNOSIS — D48.5 NEOPLASM OF UNCERTAIN BEHAVIOR OF SKIN: ICD-10-CM

## 2022-11-29 DIAGNOSIS — Z12.83 SCREENING EXAM FOR SKIN CANCER: Primary | ICD-10-CM

## 2022-11-29 NOTE — PATIENT INSTRUCTIONS
HISTORY OF SQUAMOUS CELL CARCINOMA       Assessment and Plan:  Based on a thorough discussion of this condition and the management approach to it (including a comprehensive discussion of the known risks, side effects and potential benefits of treatment), the patient (family) agrees to implement the following specific plan:  Continue skin examinations   The nature of sun-induced photo-aging and skin cancers is discussed  Sun avoidance, protective clothing, and the use of 30-SPF sunscreens is advised  Observe closely for skin damage/changes, and call if such occurs  Will obtain medical records from prior dermatologist to confirm information with signed medical release form faxed to Dr Roshni Hyde    How can [facility] be prevented? The most important way to prevent SCC is to avoid sunburn  This is especially important in childhood and early life  Fair skinned individuals and those with a personal or family history of BCC should protect their skin from sun exposure daily, year-round and lifelong  Stay indoors or under the shade in the middle of the day   Wear covering clothing   Apply high protection factor SPF50+ broad-spectrum sunscreens generously to exposed skin if outdoors   Avoid indoor tanning (sun beds, solaria)      What is the outlook for SCC? Most SCC are cured by treatment  Cure is most likely if treatment is undertaken when the lesion is small  A small percent of SCC's can spread to lymph  nodes and long term monitoring is indicated  They are also at increased risk of other skin cancers, especially melanoma  Regular self-skin examinations and long-term annual skin checks by an experienced health professional are recommended        MELANOCYTIC NEVI ("Moles")    Assessment and Plan:  Based on a thorough discussion of this condition and the management approach to it (including a comprehensive discussion of the known risks, side effects and potential benefits of treatment), the patient (family) agrees to implement the following specific plan:  Reassured benign   Monitor for changes      Melanocytic Nevi  Melanocytic nevi ("moles") are tan or brown, raised or flat areas of the skin which have an increased number of melanocytes  Melanocytes are the cells in our body which make pigment and account for skin color  Some moles are present at birth (I e , "congenital nevi"), while others come up later in life (i e , "acquired nevi")  The sun can stimulate the body to make more moles  Sunburns are not the only thing that triggers more moles  Chronic sun exposure can do it too  Clinically distinguishing a healthy mole from melanoma may be difficult, even for experienced dermatologists  The "ABCDE's" of moles have been suggested as a means of helping to alert a person to a suspicious mole and the possible increased risk of melanoma  The suggestions for raising alert are as follows:    Asymmetry: Healthy moles tend to be symmetric, while melanomas are often asymmetric  Asymmetry means if you draw a line through the mole, the two halves do not match in color, size, shape, or surface texture  Asymmetry can be a result of rapid enlargement of a mole, the development of a raised area on a previously flat lesion, scaling, ulceration, bleeding or scabbing within the mole  Any mole that starts to demonstrate "asymmetry" should be examined promptly by a board certified dermatologist      Border: Healthy moles tend to have discrete, even borders  The border of a melanoma often blends into the normal skin and does not sharply delineate the mole from normal skin  Any mole that starts to demonstrate "uneven borders" should be examined promptly by a board certified dermatologist      Color: Healthy moles tend to be one color throughout  Melanomas tend to be made up of different colors ranging from dark black, blue, white, or red    Any mole that demonstrates a color change should be examined promptly by a board certified dermatologist      Diameter: Healthy moles tend to be smaller than 0 6 cm in size; an exception are "congenital nevi" that can be larger  Melanomas tend to grow and can often be greater than 0 6 cm (1/4 of an inch, or the size of a pencil eraser)  This is only a guideline, and many normal moles may be larger than 0 6 cm without being unhealthy  Any mole that starts to change in size (small to bigger or bigger to smaller) should be examined promptly by a board certified dermatologist      Evolving: Healthy moles tend to "stay the same "  Melanomas may often show signs of change or evolution such as a change in size, shape, color, or elevation  Any mole that starts to itch, bleed, crust, burn, hurt, or ulcerate or demonstrate a change or evolution should be examined promptly by a board certified dermatologist       Dysplastic Nevi  Dysplastic moles are moles that fit the ABCDE rules of melanoma but are not identified as melanomas when examined under the microscope  They may indicate an increased risk of melanoma in that person  If there is a family history of melanoma, most experts agree that the person may be at an increased risk for developing a melanoma  Experts still do not agree on what dysplastic moles mean in patients without a personal or family history of melanoma  Dysplastic moles are usually larger than common moles and have different colors within it with irregular borders  The appearance can be very similar to a melanoma  Biopsies of dysplastic moles may show abnormalities which are different from a regular mole  Melanoma  Malignant melanoma is a type of skin cancer that can be deadly if it spreads throughout the body  The incidence of melanoma in the [place] is growing faster than any other cancer  Melanoma usually grows near the surface of the skin for a period of time, and then begins to grow deeper into the skin   Once it grows deeper into the skin, the risk of spread to other organs greatly increases  Therefore, early detection and removal of a malignant melanoma may result in a better chance at a complete cure; removal after the tumor has spread may not be as effective, leading to worse clinical outcomes such as death  The true rate of nevus transformation into a melanoma is unknown  It has been estimated that the lifetime risk for any acquired melanocytic nevus on any 21year-old individual transforming into melanoma by age [de-identified] is 0 03% (1 in 3,164) for men and 0 009% (1 in 10,800) for women  The appearance of a "new mole" remains one of the most reliable methods for identifying a malignant melanoma  Occasionally, melanomas appear as rapidly growing, blue-black, dome-shaped bumps within a previous mole or previous area of normal skin  Other times, melanomas are suspected when a mole suddenly appears or changes  Itching, burning, or pain in a pigmented lesion should increase suspicion, but most patients with early melanoma have no skin discomfort whatsoever  Melanoma can occur anywhere on the skin, including areas that are difficult for self-examination  Many melanomas are first noticed by other family members  Suspicious-looking moles may be removed for microscopic examination  You may be able to prevent death from melanoma by doing two simple things:    Try to avoid unnecessary sun exposure and protect your skin when it is exposed to the sun  People who live near the equator, people who have intermittent exposures to large amounts of sun, and people who have had sunburns in childhood or adolescence have an increased risk for melanoma  Sun sense and vigilant sun protection may be keys to helping to prevent melanoma  We recommend wearing UPF-rated sun protective clothing and sunglasses whenever possible and applying a moisturizer-sunscreen combination product (SPF 50+) such as Neutrogena Daily Defense to sun exposed areas of skin at least three times a day      Have your moles regularly examined by a board certified dermatologist AND by yourself or a family member/friend at home  We recommend that you have your moles examined at least once a year by a board certified dermatologist   Use your birthday as an annual reminder to have your "Birthday Suit" (I e , your skin) examined; it is a nice birthday gift to yourself to know that your skin is healthy appearing! Additionally, at-home self examinations may be helpful for detecting a possible melanoma  Use the ABCDEs we discussed and check your moles once a month at home  NEOPLASM OF UNCERTAIN BEHAVIOR OF SKIN    Assessment and Plan:  I have discussed with the patient that a sample of skin via a "skin biopsy” would be potentially helpful to further make a specific diagnosis under the microscope  Based on a thorough discussion of this condition and the management approach to it (including a comprehensive discussion of the known risks, side effects and potential benefits of treatment), the patient (family) agrees to implement the following specific plan:    Procedure:  Skin Biopsy  After a thorough discussion of treatment options and risk/benefits/alternatives (including but not limited to local pain, scarring, dyspigmentation, blistering, possible superinfection, and inability to confirm a diagnosis via histopathology), verbal and written consent were obtained and portion of the rash was biopsied for tissue sample  See below for consent that was obtained from patient and subsequent Procedure Note  INFORMED CONSENT DISCUSSION AND POST-OPERATIVE INSTRUCTIONS FOR PATIENT    I   RATIONALE FOR PROCEDURE  I understand that a skin biopsy allows the Dermatologist to test a lesion or rash under the microscope to obtain a diagnosis  It usually involves numbing the area with numbing medication and removing a small piece of skin; sometimes the area will be closed with sutures   In this specific procedure, sutures are not usually needed  If any sutures are placed, then they are usually need to be removed in 2 weeks or less  I understand that my Dermatologist recommends that a skin "shave" biopsy be performed today  A local anesthetic, similar to the kind that a dentist uses when filling a cavity, will be injected with a very small needle into the skin area to be sampled  The injected skin and tissue underneath "will go to sleep” and become numb so no pain should be felt afterwards  An instrument shaped like a tiny "razor blade" (shave biopsy instrument) will be used to cut a small piece of tissue and skin from the area so that a sample of tissue can be taken and examined more closely under the microscope  A slight amount of bleeding will occur, but it will be stopped with direct pressure and a pressure bandage and any other appropriate methods  I understands that a scar will form where the wound was created  Surgical ointment will be applied to help protect the wound  Sutures are not usually needed  II   RISKS AND POTENTIAL COMPLICATIONS   I understand the risks and potential complications of a skin biopsy include but are not limited to the following:  Bleeding  Infection  Pain  Scar/keloid  Skin discoloration  Incomplete Removal  Recurrence  Nerve Damage/Numbness/Loss of Function  Allergic Reaction to Anesthesia  Biopsies are diagnostic procedures and based on findings additional treatment or evaluation may be required  Loss or destruction of specimen resulting in no additional findings    My Dermatologist has explained to me the nature of the condition, the nature of the procedure, and the benefits to be reasonably expected compared with alternative approaches  My Dermatologist has discussed the likelihood of major risks or complications of this procedure including the specific risks listed above, such as bleeding, infection, and scarring/keloid  I understand that a scar is expected after this procedure    I understand that my physician cannot predict if the scar will form a "keloid," which extends beyond the borders of the wound that is created  A keloid is a thick, painful, and bumpy scar  A keloid can be difficult to treat, as it does not always respond well to therapy, which includes injecting cortisone directly into the keloid every few weeks  While this usually reduces the pain and size of the scar, it does not eliminate it  I understand that photographs may be taken before and after the procedure  These will be maintained as part of the medical providers confidential records and may not be made available to me  I further authorize the medical provider to use the photographs for teaching purposes or to illustrate scientific papers, books, or lectures if in his/her judgment, medical research, education, or science may benefit from its use  I have had an opportunity to fully inquire about the risks and benefits of this procedure and its alternatives  I have been given ample time and opportunity to ask questions and to seek a second opinion if I wished to do so  I acknowledge that there have specifically been no guarantees as to the cosmetic results from the procedure  I am aware that with any procedure there is always the possibility of an unexpected complication  III  POST-PROCEDURAL CARE (WHAT YOU WILL NEED TO DO "AFTER THE BIOPSY" TO OPTIMIZE HEALING)    Keep the area clean and dry  Try NOT to remove the bandage or get it wet for the first 24 hours  Gently clean the area and apply surgical ointment (such as Vaseline petrolatum ointment, which is available "over the counter" and not a prescription) to the biopsy site for up to 2 weeks straight  This acts to protect the wound from the outside world  Sutures are not usually placed in this procedure  If any sutures were placed, return for suture removal as instructed (generally 1 week for the face, 2 weeks for the body)        Take Acetaminophen (Tylenol) for discomfort, if no contraindications  Ibuprofen or aspirin could make bleeding worse  Call our office immediately for signs of infection: fever, chills, increased redness, warmth, tenderness, discomfort/pain, or pus or foul smell coming from the wound  WHAT TO DO IF THERE IS ANY BLEEDING? If a small amount of bleeding is noticed, place a clean cloth over the area and apply firm pressure for ten minutes  Check the wound after 10 minutes of direct pressure  If bleeding persists, try one more time for an additional 10 minutes of direct pressure on the area  If the bleeding becomes heavier or does not stop after the second attempt, or if you have any other questions about this procedure, then please call your [facility] by calling [phone] ([phone])  I hereby acknowledge that I have reviewed and verified the site with my Dermatologist and have requested and authorized my Dermatologist to proceed with the procedure      BLACK HEAD    Assessment and Plan:  Based on a thorough discussion of this condition and the management approach to it (including a comprehensive discussion of the known risks, side effects and potential benefits of treatment), the patient (family) agrees to implement the following specific plan:  Discussed if bothersome to schedule appointment for area to be removed

## 2022-11-29 NOTE — PROGRESS NOTES
[facility] Note     Patient Name: Gato Tao  Encounter Date: 11/29/2022     Have you been cared for by a [facility] in the last 3 years and, if so, which description applies to you? YES  I HAVE been seen here within the last 3 years, and my medical history HAS changed  I am FEMALE/of child-bearing potential     REVIEW OF SYSTEMS:  Have you recently had or currently have any of the following? · Recent fever or chills? No  · Any non-healing wound? Yes on left forearm  · Are you pregnant or planning to become pregnant? No  · Are you currently or planning to be nursing or breast feeding? No   PAST MEDICAL HISTORY:  Have you personally ever had or currently have any of the following? If "YES," then please provide more detail  · Skin cancer (such as Melanoma, Basal Cell Carcinoma, Squamous Cell Carcinoma? YES, SCC  · Tuberculosis, HIV/AIDS, Hepatitis B or C: No  · Systemic Immunosuppression such as Diabetes, Biologic or Immunotherapy, Chemotherapy, Organ Transplantation, Bone Marrow Transplantation No  · Radiation Treatment No   FAMILY HISTORY:  Any "first degree relatives" (parent, brother, sister, or child) with the following? • Skin Cancer, Pancreatic or Other Cancer? No   PATIENT EXPERIENCE:    • Do you want the Dermatologist to perform a COMPLETE skin exam today including a clinical examination under the "bra and underwear" areas? Yes  • If necessary, do we have your permission to call and leave a detailed message on your Preferred Phone number that includes your specific medical information?   Yes      No Known Allergies   Current Outpatient Medications:   •  aspirin 81 mg chewable tablet, Chew 81 mg daily, Disp: , Rfl:   •  Calcium Carbonate-Vit D-Min (CALCIUM 1200 PO), Take by mouth daily, Disp: , Rfl:   •  Cholecalciferol (VITAMIN D) 50 MCG (2000 UT) tablet, Take 2,000 Units by mouth daily , Disp: , Rfl:   •  estradiol (ESTRACE) 0 1 mg/g vaginal cream, Insert 1 g into the vagina 2 (two) times a week, Disp: 42 5 g, Rfl: 0  •  lisinopril (ZESTRIL) 40 mg tablet, Take 1 tablet (40 mg total) by mouth daily, Disp: 90 tablet, Rfl: 3  •  Multiple Vitamins-Minerals (MULTIVITAMIN WOMEN PO), Take 1 tablet by mouth daily , Disp: , Rfl:   •  cephalexin (KEFLEX) 500 mg capsule, TAKE 4 CAPSULES (500 MG) 1 HOUR PRIOR TO DENTAL PROCEDURE (Patient not taking: Reported on 11/29/2022), Disp: , Rfl:           • Whom besides the patient is providing clinical information about today's encounter?   o NO ADDITIONAL HISTORIAN (patient alone provided history)    Physical Exam and Assessment/Plan by Diagnosis:      SCREENING SKIN EXAMINATION     Physical Exam:  • Anatomic Location Affected:  Left hand  • Morphological Description of Scar:  Well healed   • Suspected Recurrence: no  • Regional adenopathy: no    Additional History of Present Condition:  Patient states thing perhaps were biopsied, but she isn't sure  Area did not need further treatment as per patient  She cannot remember any details  She said that things perhaps were frozen or burned off  Assessment and Plan:  Based on a thorough discussion of this condition and the management approach to it (including a comprehensive discussion of the known risks, side effects and potential benefits of treatment), the patient (family) agrees to implement the following specific plan:  • Continue skin examinations   • The nature of sun-induced photo-aging and skin cancers is discussed  Sun avoidance, protective clothing, and the use of 30-SPF sunscreens is advised  Observe closely for skin damage/changes, and call if such occurs  • Will obtain medical records from prior dermatologist to confirm information with signed medical release form faxed to Dr Stephany Pena    How can [facility] be prevented? The most important way to prevent SCC is to avoid sunburn  This is especially important in childhood and early life   Fair skinned individuals and those with a personal or family history of BCC should protect their skin from sun exposure daily, year-round and lifelong  • Stay indoors or under the shade in the middle of the day   • Wear covering clothing   • Apply high protection factor SPF50+ broad-spectrum sunscreens generously to exposed skin if outdoors   • Avoid indoor tanning (sun beds, solaria)      What is the outlook for SCC? Most SCC are cured by treatment  Cure is most likely if treatment is undertaken when the lesion is small  A small percent of SCC's can spread to lymph  nodes and long term monitoring is indicated  They are also at increased risk of other skin cancers, especially melanoma  Regular self-skin examinations and long-term annual skin checks by an experienced health professional are recommended  Melanocytic Nevi (NOT NOTED ON EXAM)  Melanocytic nevi ("moles") are tan or brown, raised or flat areas of the skin which have an increased number of melanocytes  Melanocytes are the cells in our body which make pigment and account for skin color  Some moles are present at birth (I e , "congenital nevi"), while others come up later in life (i e , "acquired nevi")  The sun can stimulate the body to make more moles  Sunburns are not the only thing that triggers more moles  Chronic sun exposure can do it too  Clinically distinguishing a healthy mole from melanoma may be difficult, even for experienced dermatologists  The "ABCDE's" of moles have been suggested as a means of helping to alert a person to a suspicious mole and the possible increased risk of melanoma  The suggestions for raising alert are as follows:    Asymmetry: Healthy moles tend to be symmetric, while melanomas are often asymmetric  Asymmetry means if you draw a line through the mole, the two halves do not match in color, size, shape, or surface texture   Asymmetry can be a result of rapid enlargement of a mole, the development of a raised area on a previously flat lesion, scaling, ulceration, bleeding or scabbing within the mole  Any mole that starts to demonstrate "asymmetry" should be examined promptly by a board certified dermatologist      Border: Healthy moles tend to have discrete, even borders  The border of a melanoma often blends into the normal skin and does not sharply delineate the mole from normal skin  Any mole that starts to demonstrate "uneven borders" should be examined promptly by a board certified dermatologist      Color: Healthy moles tend to be one color throughout  Melanomas tend to be made up of different colors ranging from dark black, blue, white, or red  Any mole that demonstrates a color change should be examined promptly by a board certified dermatologist      Diameter: Healthy moles tend to be smaller than 0 6 cm in size; an exception are "congenital nevi" that can be larger  Melanomas tend to grow and can often be greater than 0 6 cm (1/4 of an inch, or the size of a pencil eraser)  This is only a guideline, and many normal moles may be larger than 0 6 cm without being unhealthy  Any mole that starts to change in size (small to bigger or bigger to smaller) should be examined promptly by a board certified dermatologist      Evolving: Healthy moles tend to "stay the same "  Melanomas may often show signs of change or evolution such as a change in size, shape, color, or elevation  Any mole that starts to itch, bleed, crust, burn, hurt, or ulcerate or demonstrate a change or evolution should be examined promptly by a board certified dermatologist       Dysplastic Nevi  Dysplastic moles are moles that fit the ABCDE rules of melanoma but are not identified as melanomas when examined under the microscope  They may indicate an increased risk of melanoma in that person  If there is a family history of melanoma, most experts agree that the person may be at an increased risk for developing a melanoma    Experts still do not agree on what dysplastic moles mean in patients without a personal or family history of melanoma  Dysplastic moles are usually larger than common moles and have different colors within it with irregular borders  The appearance can be very similar to a melanoma  Biopsies of dysplastic moles may show abnormalities which are different from a regular mole  Melanoma  Malignant melanoma is a type of skin cancer that can be deadly if it spreads throughout the body  The incidence of melanoma in the [place] is growing faster than any other cancer  Melanoma usually grows near the surface of the skin for a period of time, and then begins to grow deeper into the skin  Once it grows deeper into the skin, the risk of spread to other organs greatly increases  Therefore, early detection and removal of a malignant melanoma may result in a better chance at a complete cure; removal after the tumor has spread may not be as effective, leading to worse clinical outcomes such as death  The true rate of nevus transformation into a melanoma is unknown  It has been estimated that the lifetime risk for any acquired melanocytic nevus on any 21year-old individual transforming into melanoma by age [de-identified] is 0 03% (1 in 3,164) for men and 0 009% (1 in 10,800) for women  The appearance of a "new mole" remains one of the most reliable methods for identifying a malignant melanoma  Occasionally, melanomas appear as rapidly growing, blue-black, dome-shaped bumps within a previous mole or previous area of normal skin  Other times, melanomas are suspected when a mole suddenly appears or changes  Itching, burning, or pain in a pigmented lesion should increase suspicion, but most patients with early melanoma have no skin discomfort whatsoever  Melanoma can occur anywhere on the skin, including areas that are difficult for self-examination  Many melanomas are first noticed by other family members  Suspicious-looking moles may be removed for microscopic examination  You may be able to prevent death from melanoma by doing two simple things:    1  Try to avoid unnecessary sun exposure and protect your skin when it is exposed to the sun  People who live near the equator, people who have intermittent exposures to large amounts of sun, and people who have had sunburns in childhood or adolescence have an increased risk for melanoma  Sun sense and vigilant sun protection may be keys to helping to prevent melanoma  We recommend wearing UPF-rated sun protective clothing and sunglasses whenever possible and applying a moisturizer-sunscreen combination product (SPF 50+) such as Neutrogena Daily Defense to sun exposed areas of skin at least three times a day  2  Have your moles regularly examined by a board certified dermatologist AND by yourself or a family member/friend at home  We recommend that you have your moles examined at least once a year by a board certified dermatologist   Use your birthday as an annual reminder to have your "Birthday Suit" (I e , your skin) examined; it is a nice birthday gift to yourself to know that your skin is healthy appearing! Additionally, at-home self examinations may be helpful for detecting a possible melanoma  Use the ABCDEs we discussed and check your moles once a month at home  NEOPLASM OF UNCERTAIN BEHAVIOR OF SKIN    Physical Exam:  • (Anatomic Location); (Size and Morphological Description); (Differential Diagnosis):  o Specimen A: left extensor forearm; skin; tangential biopsy; [de-identified]year old female with a 0 65 cm indurated scaly pink papule; Differential diagnosis:  BCC or SCC, perhaps in situ  • Pertinent Positives:  • Pertinent Negatives: Additional History of Present Condition:  Present since summer and notices area not healing      Assessment and Plan:  • I have discussed with the patient that a sample of skin via a "skin biopsy” would be potentially helpful to further make a specific diagnosis under the microscope  • Based on a thorough discussion of this condition and the management approach to it (including a comprehensive discussion of the known risks, side effects and potential benefits of treatment), the patient (family) agrees to implement the following specific plan:    o Procedure:  Skin Biopsy  After a thorough discussion of treatment options and risk/benefits/alternatives (including but not limited to local pain, scarring, dyspigmentation, blistering, possible superinfection, and inability to confirm a diagnosis via histopathology), verbal and written consent were obtained and portion of the rash was biopsied for tissue sample  See below for consent that was obtained from patient and subsequent Procedure Note  PROCEDURE TANGENTIAL (SHAVE) BIOPSY NOTE:    • Performing Physician: Dr Markham  • Anatomic Location; Clinical Description with size (cm); Pre-Op Diagnosis:   Specimen A: left extensor forearm; skin; tangential biopsy; [de-identified]year old female with a 0 65 cm indurated scaly pink papule; Differential diagnosis:    • Post-op diagnosis: Same     • Local anesthesia: 1% Lidocaine HCL     • Topical anesthesia: None    • Hemostasis: Aluminum chloride       After obtaining informed consent  at which time there was a discussion about the purpose of biopsy  and low risks of infection and bleeding  The area was prepped and draped in the usual fashion  Anesthesia was obtained with 1% lidocaine with epinephrine  A shave biopsy to an appropriate sampling depth was obtained by Shave (Dermablade or 15 blade) The resulting wound was covered with surgical ointment and bandaged appropriately  The patient tolerated the procedure well without complications and was without signs of functional compromise  Specimen has been sent for review by Dermatopathology  Standard post-procedure care has been explained and has been included in written form within the patient's copy of Informed Consent      INFORMED CONSENT DISCUSSION AND POST-OPERATIVE INSTRUCTIONS FOR PATIENT    I   RATIONALE FOR PROCEDURE  I understand that a skin biopsy allows the Dermatologist to test a lesion or rash under the microscope to obtain a diagnosis  It usually involves numbing the area with numbing medication and removing a small piece of skin; sometimes the area will be closed with sutures  In this specific procedure, sutures are not usually needed  If any sutures are placed, then they are usually need to be removed in 2 weeks or less  I understand that my Dermatologist recommends that a skin "shave" biopsy be performed today  A local anesthetic, similar to the kind that a dentist uses when filling a cavity, will be injected with a very small needle into the skin area to be sampled  The injected skin and tissue underneath "will go to sleep” and become numb so no pain should be felt afterwards  An instrument shaped like a tiny "razor blade" (shave biopsy instrument) will be used to cut a small piece of tissue and skin from the area so that a sample of tissue can be taken and examined more closely under the microscope  A slight amount of bleeding will occur, but it will be stopped with direct pressure and a pressure bandage and any other appropriate methods  I understands that a scar will form where the wound was created  Surgical ointment will be applied to help protect the wound  Sutures are not usually needed      II   RISKS AND POTENTIAL COMPLICATIONS   I understand the risks and potential complications of a skin biopsy include but are not limited to the following:  • Bleeding  • Infection  • Pain  • Scar/keloid  • Skin discoloration  • Incomplete Removal  • Recurrence  • Nerve Damage/Numbness/Loss of Function  • Allergic Reaction to Anesthesia  • Biopsies are diagnostic procedures and based on findings additional treatment or evaluation may be required  • Loss or destruction of specimen resulting in no additional findings    My Dermatologist has explained to me the nature of the condition, the nature of the procedure, and the benefits to be reasonably expected compared with alternative approaches  My Dermatologist has discussed the likelihood of major risks or complications of this procedure including the specific risks listed above, such as bleeding, infection, and scarring/keloid  I understand that a scar is expected after this procedure  I understand that my physician cannot predict if the scar will form a "keloid," which extends beyond the borders of the wound that is created  A keloid is a thick, painful, and bumpy scar  A keloid can be difficult to treat, as it does not always respond well to therapy, which includes injecting cortisone directly into the keloid every few weeks  While this usually reduces the pain and size of the scar, it does not eliminate it  I understand that photographs may be taken before and after the procedure  These will be maintained as part of the medical providers confidential records and may not be made available to me  I further authorize the medical provider to use the photographs for teaching purposes or to illustrate scientific papers, books, or lectures if in his/her judgment, medical research, education, or science may benefit from its use  I have had an opportunity to fully inquire about the risks and benefits of this procedure and its alternatives  I have been given ample time and opportunity to ask questions and to seek a second opinion if I wished to do so  I acknowledge that there have specifically been no guarantees as to the cosmetic results from the procedure  I am aware that with any procedure there is always the possibility of an unexpected complication  III  POST-PROCEDURAL CARE (WHAT YOU WILL NEED TO DO "AFTER THE BIOPSY" TO OPTIMIZE HEALING)    • Keep the area clean and dry  Try NOT to remove the bandage or get it wet for the first 24 hours      • Gently clean the area and apply surgical ointment (such as Vaseline petrolatum ointment, which is available "over the counter" and not a prescription) to the biopsy site for up to 2 weeks straight  This acts to protect the wound from the outside world  • Sutures are not usually placed in this procedure  If any sutures were placed, return for suture removal as instructed (generally 1 week for the face, 2 weeks for the body)  • Take Acetaminophen (Tylenol) for discomfort, if no contraindications  Ibuprofen or aspirin could make bleeding worse  • Call our office immediately for signs of infection: fever, chills, increased redness, warmth, tenderness, discomfort/pain, or pus or foul smell coming from the wound  WHAT TO DO IF THERE IS ANY BLEEDING? If a small amount of bleeding is noticed, place a clean cloth over the area and apply firm pressure for ten minutes  Check the wound after 10 minutes of direct pressure  If bleeding persists, try one more time for an additional 10 minutes of direct pressure on the area  If the bleeding becomes heavier or does not stop after the second attempt, or if you have any other questions about this procedure, then please call your [facility]'s Dermatologist by calling [phone] (SKIN)  I hereby acknowledge that I have reviewed and verified the site with my Dermatologist and have requested and authorized my Dermatologist to proceed with the procedure        Scribe Attestation    I,:  Tasha So am acting as a scribe while in the presence of the attending physician :       I,:  Abena Santizo MD personally performed the services described in this documentation    as scribed in my presence :

## 2022-12-01 ENCOUNTER — OFFICE VISIT (OUTPATIENT)
Dept: PHYSICAL THERAPY | Facility: CLINIC | Age: 82
End: 2022-12-01

## 2022-12-01 DIAGNOSIS — Z96.611 HISTORY OF REVISION OF TOTAL REPLACEMENT OF RIGHT SHOULDER JOINT: Primary | ICD-10-CM

## 2022-12-01 NOTE — PROGRESS NOTES
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  [id number] [id number]                      Subjective: Pt reports her arm is sore but she is also out of the sling now  Objective: See treatment diary below      Assessment: Pt is tolerating exercises well, as well as prom  Prom is still limited in all directions  Will continue to progress as able  Pt will be away for 10 days around the holidays, educated to order pulleys for continued stretching while away  Plan: Continue per plan of care        Precautions: PROM only until  then add AAROM      Manuals 10/31 11/3 11/8 11/11 11/18 11/22 11/25 11/29 12/1    PROM per protocol MD CASTELLANOS MO Vince Ruiz MD MO                                            Neuro Re-Ed             Cane ext with scap sq                                                                                           Ther Ex             Table slides    5"x10  flex 5"x10ea  Flex/  scap flex, scap  5"x10 ea Wall slides 10x10" Wall slides  10x10" Wall slides  10x10"    Finger ladder    5"x5 flex 5"x5 5"x10 x15 x15 15x    Pulleys       Flex 10x10" Flex 10x7" Flex 10x10''    Supine cane AAROM (flex/ER)                                                                 Ther Activity                                       Gait Training                                       Modalities             CP post tx      8'

## 2022-12-02 ENCOUNTER — TELEPHONE (OUTPATIENT)
Dept: DERMATOLOGY | Facility: CLINIC | Age: 82
End: 2022-12-02

## 2022-12-02 NOTE — TELEPHONE ENCOUNTER
Tried calling patient x 2  No answer  Left VM that we would try calling back later  ----- Message from Oliva Lopez MD sent at 12/2/2022 10:49 AM EST -----  DERMATOPATHOLOGY RESULT NOTE    Results reviewed by ordering physician  Please notify patient of diagnosis and recommendation  Instructions for Clinical Derm Team:   (remember to route Result Note to appropriate staff):    Excision in Wednesday excision clinic  Result & Plan by Specimen:    Specimen A: malignant  Plan: excision in Wednesday excision clinic      Case Report  Surgical Pathology Report                         Case: [id number]                                   Authorizing Provider: Oliva Lopez MD         Collected:           11/29/2022 1241              Ordering Location:     [facility]      Received:            11/29/2022 124                                   [facility]                                                                Pathologist:           Carlos Licona MD                                                           Specimen:    Skin, Other, specimen A: left extensor foream                                            Final Diagnosis  A  Skin,  left extensor forearm, shave biopsy:     BASAL CELL CARCINOMA (SUPERFICIAL AND NODULAR TYPE); extending to the tissue edges           Electronically signed by Carlos Licona MD on 12/2/2022 at 10:10 AM  Additional Information   All reported additional testing was performed with appropriately reactive controls   These tests were developed and their performance characteristics determined by [facility] or appropriate performing facility, though some tests may be performed on tissues which have not been validated for performance characteristics (such as staining performed on alcohol exposed cell blocks and decalcified tissues)   Results should be interpreted with caution and in the context of the patients' clinical condition   These tests may not be cleared or approved by the [place]  Food and Drug Administration, though the FDA has determined that such clearance or approval is not necessary  These tests are used for clinical purposes and they should not be regarded as investigational or for research  This laboratory has been approved by [facility], designated as a high-complexity laboratory and is qualified to perform these tests  Jake Perez Description   A  The specimen is received in formalin, labeled with the patient's name and hospital number, and is designated "left extensor forearm"  The specimen consists of a tan superficial shaving of skin measuring 0 7 x 0 5 x 0 1 cm  The skin surface exhibits a tan keratotic papule measuring 0 3 x 0 2 x 0 1 cm  The margin of resection is inked green  The skin surface is inked red  The specimen is bisected  Entirely submitted  One cassette  Between sponges      Note: The estimated total formalin fixation time based upon information provided by the submitting clinician and the standard processing schedule is under 72 hours      [facility]rites    Clinical Information   Specimen A: left extensor forearm; skin; tangential biopsy; [de-identified]year old female with a 0 65 cm indurated scaly pink papule; Differential diagnosis:  Basal or squamous-cell carcinoma     Attn: derm path dept  Resulting Agency [id number]          Specimen Collected: 11/29/22 12:41 PM Last Resulted: 12/02/22 10:10 AM

## 2022-12-06 ENCOUNTER — APPOINTMENT (OUTPATIENT)
Dept: PHYSICAL THERAPY | Facility: CLINIC | Age: 82
End: 2022-12-06

## 2022-12-09 ENCOUNTER — TELEPHONE (OUTPATIENT)
Dept: DERMATOLOGY | Facility: CLINIC | Age: 82
End: 2022-12-09

## 2022-12-09 ENCOUNTER — APPOINTMENT (OUTPATIENT)
Dept: PHYSICAL THERAPY | Facility: CLINIC | Age: 82
End: 2022-12-09

## 2022-12-09 NOTE — TELEPHONE ENCOUNTER
----- Message from Cha Pfeiffer MD sent at 12/5/2022 12:28 PM EST -----  DERMATOPATHOLOGY RESULT NOTE    Results reviewed by ordering physician  Called patient to personally discuss results  Discussed results with patient  Instructions for Clinical Derm Team:   (remember to route Result Note to appropriate staff):    Call patient and schedule for excision  Patient prefers [address] office  Result & Plan by Specimen:    Specimen A: malignant  Plan: excision    Status: Final result     Visible to patient: Yes (seen)     Dx: Neoplasm of uncertain behavior of skin     2 Result Notes    1 Follow-up Encounter  Component   Case Report  Surgical Pathology Report                         Case: [id number]                                   Authorizing Provider: Emilie Carpio MD         Collected:           11/29/2022 1241              Ordering Location:     [facility]      Received:            11/29/2022 [address] [place] [address]                                                                Pathologist:           Sj Crouch MD                                                           Specimen:    Skin, Other, specimen A: left extensor foream                                            Final Diagnosis  A   Skin,  left extensor forearm, shave biopsy:     BASAL CELL CARCINOMA (SUPERFICIAL AND NODULAR TYPE); extending to the tissue edges           Electronically signed by Sj Crouch MD on 12/2/2022 at 10:10 AM  Additional Information   All reported additional testing was performed with appropriately reactive controls   These tests were developed and their performance characteristics determined by [facility] or appropriate performing facility, though some tests may be performed on tissues which have not been validated for performance characteristics (such as staining performed on alcohol exposed cell blocks and decalcified tissues)   Results should be interpreted with caution and in the context of the patients' clinical condition  These tests may not be cleared or approved by the [place]  Food and Drug Administration, though the FDA has determined that such clearance or approval is not necessary  These tests are used for clinical purposes and they should not be regarded as investigational or for research  This laboratory has been approved by [facility], designated as a high-complexity laboratory and is qualified to perform these tests  Vicenta Altman Description   A  The specimen is received in formalin, labeled with the patient's name and hospital number, and is designated "left extensor forearm"  The specimen consists of a tan superficial shaving of skin measuring 0 7 x 0 5 x 0 1 cm  The skin surface exhibits a tan keratotic papule measuring 0 3 x 0 2 x 0 1 cm  The margin of resection is inked green  The skin surface is inked red  The specimen is bisected  Entirely submitted  One cassette  Between sponges      Note: The estimated total formalin fixation time based upon information provided by the submitting clinician and the standard processing schedule is under 72 hours      [facility]    Clinical Information   Specimen A: left extensor forearm; skin; tangential biopsy; [de-identified]year old female with a 0 65 cm indurated scaly pink papule; Differential diagnosis:  Basal or squamous-cell carcinoma     Attn: derm path dept  Resulting Agency [facility]          Specimen Collected: 11/29/22 12:41 PM Last Resulted: 12/02/22 10:10 AM

## 2022-12-09 NOTE — TELEPHONE ENCOUNTER
Spoke with the patient and she is aware of her excision date, time, and location  Patient had no further questions

## 2022-12-13 ENCOUNTER — APPOINTMENT (OUTPATIENT)
Dept: PHYSICAL THERAPY | Facility: CLINIC | Age: 82
End: 2022-12-13

## 2022-12-16 ENCOUNTER — APPOINTMENT (OUTPATIENT)
Dept: PHYSICAL THERAPY | Facility: CLINIC | Age: 82
End: 2022-12-16

## 2023-01-03 ENCOUNTER — PROCEDURE VISIT (OUTPATIENT)
Dept: DERMATOLOGY | Facility: CLINIC | Age: 83
End: 2023-01-03

## 2023-01-03 VITALS — HEIGHT: 63 IN | WEIGHT: 116.3 LBS | BODY MASS INDEX: 20.61 KG/M2 | TEMPERATURE: 97.8 F

## 2023-01-03 DIAGNOSIS — L72.0 EPIDERMAL INCLUSION CYST: ICD-10-CM

## 2023-01-03 DIAGNOSIS — C44.619 BASAL CELL CARCINOMA OF LEFT FOREARM: Primary | ICD-10-CM

## 2023-01-03 DIAGNOSIS — L82.1 SEBORRHEIC KERATOSIS: ICD-10-CM

## 2023-01-03 RX ORDER — AMOXICILLIN 500 MG/1
CAPSULE ORAL
COMMUNITY
Start: 2022-12-06

## 2023-01-03 NOTE — PROGRESS NOTES
[facility]atology Clinic Note     Patient Name: Misael Solorzano  Encounter Date: 01/03/2023     Have you been cared for by a [place] [id number] Dermatologist in the last 3 years and, if so, which description applies to you? Yes  I have been here within the last 3 years, and my medical history has NOT changed since that time  I am FEMALE/of child-bearing potential     REVIEW OF SYSTEMS:  Have you recently had or currently have any of the following? · No changes in my recent health  PAST MEDICAL HISTORY:  Have you personally ever had or currently have any of the following? If "YES," then please provide more detail  · No changes in my medical history  FAMILY HISTORY:  Any "first degree relatives" (parent, brother, sister, or child) with the following? • No changes in my family's known health  PATIENT EXPERIENCE:    • Do you want the Dermatologist to perform a COMPLETE skin exam today including a clinical examination under the "bra and underwear" areas? NO  • If necessary, do we have your permission to call and leave a detailed message on your Preferred Phone number that includes your specific medical information?   Yes      No Known Allergies   Current Outpatient Medications:   •  aspirin 81 mg chewable tablet, Chew 81 mg daily, Disp: , Rfl:   •  Calcium Carbonate-Vit D-Min (CALCIUM 1200 PO), Take by mouth daily, Disp: , Rfl:   •  estradiol (ESTRACE) 0 1 mg/g vaginal cream, Insert 1 g into the vagina 2 (two) times a week, Disp: 42 5 g, Rfl: 0  •  lisinopril (ZESTRIL) 40 mg tablet, Take 1 tablet (40 mg total) by mouth daily, Disp: 90 tablet, Rfl: 3  •  Multiple Vitamins-Minerals (MULTIVITAMIN WOMEN PO), Take 1 tablet by mouth daily , Disp: , Rfl:   •  amoxicillin (AMOXIL) 500 mg capsule, TAKE 4 CAPSULES BY MOUTH 1 HOUR PRIOR TO PROCEDURE, Disp: , Rfl:   •  cephalexin (KEFLEX) 500 mg capsule, TAKE 4 CAPSULES (500 MG) 1 HOUR PRIOR TO DENTAL PROCEDURE (Patient not taking: Reported on 11/29/2022), Disp: , Rfl: •  Cholecalciferol (VITAMIN D) 50 MCG (2000 UT) tablet, Take 2,000 Units by mouth daily  (Patient not taking: Reported on 1/3/2023), Disp: , Rfl:           • Whom besides the patient is providing clinical information about today's encounter?   o NO ADDITIONAL HISTORIAN (patient alone provided history)    Physical Exam and Assessment/Plan by Diagnosis:      CURETTAGE AND DESICCATION Malignant and Premalignant Lesion    Diagnosis: Basal Cell Carinoma  • Prior biopsy: Yes  • Access Number: [id number]  • Location left forearm  • Size preop 0 7 cm pink macule  • Size postop 0 8 cm    Assessment and Plan:  Based on a thorough discussion of this condition and the management approach to it (including a comprehensive discussion of the known risks, side effects and potential benefits of treatment), the patient (family) agrees to treat the above described lesion with desiccation and curettage  Procedure:  • The patient was not at all eager to have an excision performed  She is still healing from [facility] orthopedic surgery of left shoulder  • Risks and benefits of malignant destruction/C + E including recurrence, scarring, other risks discussed  • The area was cleanly  prepped in usual manner  • Anesthesia:1% Lidocaine HCL   • The above described lesion was aggressively curetted to mid dermis followed by desiccation  • Number of cycles of desiccation and curettage 3  • A clean dry dressing was placed on site  Oral and written postop care instructions were discussed and reviewed      DISCUSSION OF TREATMENT AND POSTOP CARE FOR PATIENT    What is curettage and desiccation? Curettage and desiccation is a type of electrosurgery in which a skin lesion is scraped off and heat is applied to the skin surface  What is involved in curettage and cautery? Your doctor will explain to you why your skin lesion needs treatment and the procedure involved   You may have to sign a consent form to indicate that you consent to the surgical procedure  Tell your doctor if you are taking any medication, or if you have any allergies or medical conditions  The doctor will inject some local anaesthetic into the area surrounding the lesion to be treated  This will make the skin go numb so no pain should be felt during the procedure  You may feel a pushing sensation but this should not be painful  The skin lesion is scraped off with a curette, which is like a small spoon with very sharp edges  The lesion should be sent to a pathology laboratory for analysis  The wound surface is then cauterised with a hot wire beaded tip or electrosurgical unit (diathermy)  This stops bleeding and helps destroys any remaining skin tumour cells  This procedure is usually repeated twice for malignant skin lesions (serial curettage and cautery)  A dressing may be applied and instructions should be given on how to care for your wound  What types of skin lesions can be treated by curettage? Curettage is suitable to treat lesions where the material being scraped off is softer than the surrounding skin or when there is a natural cleavage plane between the lesion and the surrounding normal tissue  The following are sometimes treated by curettage:  • Squamous cell carcinoma in situ   • Actinic keratoses   Basal cell carcinomas that are large, deep or recurrent are usually not suitable for curettage  Lesions with poorly defined edges are also generally unsuitable  Curettage and desiccation is most often used in more superficial type basal cell carcinoma  Will I have a scar? Curettage often results in some sort of scar especially if accompanied by cautery  The scars from curettage are usually flat and round  They are a similar size to that of the original skin lesion  Some people have an abnormal response to skin healing and these people may get larger scars than usual (keloids and hypertrophic scarring)      How do I look after the wound following skin curettage? The wound may be tender when the local anaesthetic wears off  • Leave the dressing in place till the nest day  Avoid strenuous exertion and stretching of the area  • If there is any bleeding, press on the wound firmly with a folded towel without looking at it for 30 minutes  If it is still bleeding after this time, seek medical attention  Follow instructions a written in our consent ,  The wound from curettage will take approximately 2-3 weeks to heal over  The scar will initially be red and raised but usually reduces in colour and size over several months  Seb keratosis, waxy tan flat epidermal papule, 0 65 cm, left cheek  Dx + removal r/b d/w pt  EIC 0 3-0 4 cm, dorsal left neck base, dx and r/b of removal d/w pt      Scribe Attestation    I,:  Tennille Padilla am acting as a scribe while in the presence of the attending physician :       I,:  Emilie Carpio MD personally performed the services described in this documentation    as scribed in my presence :

## 2023-01-03 NOTE — PATIENT INSTRUCTIONS
DISCUSSION OF TREATMENT AND POSTOP CARE FOR PATIENT    What is curettage and desiccation? Curettage and desiccation is a type of electrosurgery in which a skin lesion is scraped off and heat is applied to the skin surface  What is involved in curettage and cautery? Your doctor will explain to you why your skin lesion needs treatment and the procedure involved  You may have to sign a consent form to indicate that you consent to the surgical procedure  Tell your doctor if you are taking any medication, or if you have any allergies or medical conditions  The doctor will inject some local anaesthetic into the area surrounding the lesion to be treated  This will make the skin go numb so no pain should be felt during the procedure  You may feel a pushing sensation but this should not be painful  The skin lesion is scraped off with a curette, which is like a small spoon with very sharp edges  The lesion should be sent to a pathology laboratory for analysis  The wound surface is then cauterised with a hot wire beaded tip or electrosurgical unit (diathermy)  This stops bleeding and helps destroys any remaining skin tumour cells  This procedure is usually repeated twice for malignant skin lesions (serial curettage and cautery)  A dressing may be applied and instructions should be given on how to care for your wound  What types of skin lesions can be treated by curettage? Curettage is suitable to treat lesions where the material being scraped off is softer than the surrounding skin or when there is a natural cleavage plane between the lesion and the surrounding normal tissue  The following are sometimes treated by curettage:  Squamous cell carcinoma in situ   Actinic keratoses   Basal cell carcinomas that are large, deep or recurrent are usually not suitable for curettage  Lesions with poorly defined edges are also generally unsuitable  Curettage and desiccation is most often used in more superficial type basal cell carcinoma  Will I have a scar? Curettage often results in some sort of scar especially if accompanied by cautery  The scars from curettage are usually flat and round  They are a similar size to that of the original skin lesion  Some people have an abnormal response to skin healing and these people may get larger scars than usual (keloids and hypertrophic scarring)  How do I look after the wound following skin curettage? The wound may be tender when the local anaesthetic wears off  Leave the dressing in place till the nest day  Avoid strenuous exertion and stretching of the area  If there is any bleeding, press on the wound firmly with a folded towel without looking at it for 30 minutes  If it is still bleeding after this time, seek medical attention  Follow instructions a written in our consent ,  The wound from curettage will take approximately 2-3 weeks to heal over  The scar will initially be red and raised but usually reduces in colour and size over several months

## 2023-02-06 NOTE — PATIENT INSTRUCTIONS
Wellness Visit for Adults   AMBULATORY CARE:   A wellness visit  is when you see your healthcare provider to get screened for health problems  Your healthcare provider will also give you advice on how to stay healthy  Write down your questions so you remember to ask them  Ask your healthcare provider how often you should have a wellness visit  What happens at a wellness visit:  Your healthcare provider will ask about your health, and your family history of health problems  This includes high blood pressure, heart disease, and cancer  He or she will ask if you have symptoms that concern you, if you smoke, and about your mood  You may also be asked about your intake of medicines, supplements, food, and alcohol  Any of the following may be done: Your weight  will be checked  Your height may also be checked so your body mass index (BMI) can be calculated  Your BMI shows if you are at a healthy weight  Your blood pressure  and heart rate will be checked  Your temperature may also be checked  Blood and urine tests  may be done  Blood tests may be done to check your cholesterol levels  Abnormal cholesterol levels increase your risk for heart disease and stroke  You may also need a blood or urine test to check for diabetes if you are at increased risk  Urine tests may be done to look for signs of an infection or kidney disease  A physical exam  includes checking your heartbeat and lungs with a stethoscope  Your healthcare provider may also check your skin to look for sun damage  Screening tests  may be recommended  A screening test is done to check for diseases that may not cause symptoms  The screening tests you may need depend on your age, gender, family history, and lifestyle habits  For example, colorectal screening may be recommended if you are 48years old or older  Screening tests you need if you are a woman:   A Pap smear  is used to screen for cervical cancer   Pap smears are usually done every 3 to 5 years depending on your age  You may need them more often if you have had abnormal Pap smear test results in the past  Ask your healthcare provider how often you should have a Pap smear  A mammogram  is an x-ray of your breasts to screen for breast cancer  Experts recommend mammograms every 2 years starting at age 48 years  You may need a mammogram at age 52 years or younger if you have an increased risk for breast cancer  Talk to your healthcare provider about when you should start having mammograms and how often you need them  Vaccines you may need:   Get an influenza vaccine  every year  The influenza vaccine protects you from the flu  Several types of viruses cause the flu  The viruses change over time, so new vaccines are made each year  Get a tetanus-diphtheria (Td) booster vaccine  every 10 years  This vaccine protects you against tetanus and diphtheria  Tetanus is a severe infection that may cause painful muscle spasms and lockjaw  Diphtheria is a severe bacterial infection that causes a thick covering in the back of your mouth and throat  Get a human papillomavirus (HPV) vaccine  if you are female and aged 23 to 32 or male 23 to 24 and never received it  This vaccine protects you from HPV infection  HPV is the most common infection spread by sexual contact  HPV may also cause vaginal, penile, and anal cancers  Get a pneumococcal vaccine  if you are aged 72 years or older  The pneumococcal vaccine is an injection given to protect you from pneumococcal disease  Pneumococcal disease is an infection caused by pneumococcal bacteria  The infection may cause pneumonia, meningitis, or an ear infection  Get a shingles vaccine  if you are 60 or older, even if you have had shingles before  The shingles vaccine is an injection to protect you from the varicella-zoster virus  This is the same virus that causes chickenpox   Shingles is a painful rash that develops in people who had chickenpox or have been exposed to the virus  How to eat healthy:  My Plate is a model for planning healthy meals  It shows the types and amounts of foods that should go on your plate  Fruits and vegetables make up about half of your plate, and grains and protein make up the other half  A serving of dairy is included on the side of your plate  The amount of calories and serving sizes you need depends on your age, gender, weight, and height  Examples of healthy foods are listed below:  Eat a variety of vegetables  such as dark green, red, and orange vegetables  You can also include canned vegetables low in sodium (salt) and frozen vegetables without added butter or sauces  Eat a variety of fresh fruits , canned fruit in 100% juice, frozen fruit, and dried fruit  Include whole grains  At least half of the grains you eat should be whole grains  Examples include whole-wheat bread, wheat pasta, brown rice, and whole-grain cereals such as oatmeal     Eat a variety of protein foods such as seafood (fish and shellfish), lean meat, and poultry without skin (turkey and chicken)  Examples of lean meats include pork leg, shoulder, or tenderloin, and beef round, sirloin, tenderloin, and extra lean ground beef  Other protein foods include eggs and egg substitutes, beans, peas, soy products, nuts, and seeds  Choose low-fat dairy products such as skim or 1% milk or low-fat yogurt, cheese, and cottage cheese  Limit unhealthy fats  such as butter, hard margarine, and shortening  Exercise:  Exercise at least 30 minutes per day on most days of the week  Some examples of exercise include walking, biking, dancing, and swimming  You can also fit in more physical activity by taking the stairs instead of the elevator or parking farther away from stores  Include muscle strengthening activities 2 days each week  Regular exercise provides many health benefits   It helps you manage your weight, and decreases your risk for type 2 diabetes, heart disease, stroke, and high blood pressure  Exercise can also help improve your mood  Ask your healthcare provider about the best exercise plan for you  General health and safety guidelines:   Do not smoke  Nicotine and other chemicals in cigarettes and cigars can cause lung damage  Ask your healthcare provider for information if you currently smoke and need help to quit  E-cigarettes or smokeless tobacco still contain nicotine  Talk to your healthcare provider before you use these products  Limit alcohol  A drink of alcohol is 12 ounces of beer, 5 ounces of wine, or 1½ ounces of liquor  Lose weight, if needed  Being overweight increases your risk of certain health conditions  These include heart disease, high blood pressure, type 2 diabetes, and certain types of cancer  Protect your skin  Do not sunbathe or use tanning beds  Use sunscreen with a SPF 15 or higher  Apply sunscreen at least 15 minutes before you go outside  Reapply sunscreen every 2 hours  Wear protective clothing, hats, and sunglasses when you are outside  Drive safely  Always wear your seatbelt  Make sure everyone in your car wears a seatbelt  A seatbelt can save your life if you are in an accident  Do not use your cell phone when you are driving  This could distract you and cause an accident  Pull over if you need to make a call or send a text message  Practice safe sex  Use latex condoms if are sexually active and have more than one partner  Your healthcare provider may recommend screening tests for sexually transmitted infections (STIs)  Wear helmets, lifejackets, and protective gear  Always wear a helmet when you ride a bike or motorcycle, go skiing, or play sports that could cause a head injury  Wear protective equipment when you play sports  Wear a lifejacket when you are on a boat or doing water sports      © Copyright [employer] 2022 Information is for End User's use only and may not be sold, redistributed or otherwise used for commercial purposes  All illustrations and images included in CareNotes® are the copyrighted property of [employer]  or [employer] [place]  The above information is an  only  It is not intended as medical advice for individual conditions or treatments  Talk to your doctor, nurse or pharmacist before following any medical regimen to see if it is safe and effective for you

## 2023-02-06 NOTE — PROGRESS NOTES
Assessment         Diagnoses and all orders for this visit:    Encntr for gyn exam (general) (routine) w abnormal findings    Vaginal atrophy  -     estradiol (ESTRACE) 0 1 mg/g vaginal cream; Insert 1 g into the vagina 2 (two) times a week    Encounter for screening mammogram for breast cancer  -     Mammo screening bilateral w 3d & cad; Future    Age-related osteoporosis without current pathological fracture  -     DXA bone density spine hip and pelvis; Future             Plan      All questions answered  Discussed healthy lifestyle modifications  Follow up in 2 years  Follow up as needed  Mammogram   PAP deferred   Continue Estrace - discussed hematuria could be from severe vaginal atrophy  DEXA to be ordered by PCP  Declines colon cancer screening    Subjective Jewelene Blizzard is a [de-identified] y o  female who presents for annual exam       Chief Complaint   Patient presents with   • Gynecologic Exam     ON Estrace cream for vaginal atrophy  Had seen urology for microscopic hematuria - cystoscopy normal  She has been seeing women's health PT for WILLIAM and Kegel's exercises and has improved 90%    Not sexually active  No postmenopausal bleeding    Last Pap: 21  Last mammogram: 22  Colorectal cancer screening: declines  DEXA: 21 osteoporosis, on CA and Vit D, declines treatment        Current contraception: post menopausal status  History of abnormal Pap smear: no  History of abnormal mammogram: no  Family history of uterine or ovarian cancer: no  Family history of breast cancer: no  Family history of colon cancer: no    Menstrual History:  OB History        2    Para   2    Term   2       0    AB   0    Living   2       SAB   0    IAB   0    Ectopic   0    Multiple   0    Live Births   2                  No LMP recorded   Patient is postmenopausal    Menopause: 48s          Past Medical History:   Diagnosis Date   • Arthritis    • Basal cell carcinoma    • Disease of thyroid gland thyroid nodules   • Dry eye    • Hard of hearing    • Hematuria    • Hypertension    • Leaking of urine    • Osteoarthritis    • Peritonitis ([address] )     in age 44's due to ruptured apendix   • Shoulder joint pain     right- reverse arthroplasty today 2020   • Squamous cell skin cancer    • Wears glasses    • Wears partial dentures     upper     Past Surgical History:   Procedure Laterality Date   • APPENDECTOMY     • BREAST BIOPSY Right 02/15/2021    stereo, benign   • CHOLECYSTECTOMY LAPAROSCOPIC     • COLONOSCOPY     • HIP SURGERY     • JOINT REPLACEMENT Right     hip   • LAPAROSCOPIC CHOLECYSTECTOMY     • LAPAROSCOPY     • MAMMO STEREOTACTIC BREAST BIOPSY RIGHT (ALL INC) Right 02/15/2021   • MT ARTHROPLASTY GLENOHUMERAL JOINT TOTAL SHOULDER Right 2020    Procedure: ARTHROPLASTY SHOULDER REVERSE;  Surgeon: Jalil Regalado MD;  Location: [facility];  Service: Orthopedics   • SHOULDER SURGERY Right 2022   • TONSILLECTOMY     • TUBAL LIGATION     • WISDOM TOOTH EXTRACTION       Family History   Problem Relation Age of Onset   • Hypertension Mother            • Coronary artery disease Father    • Hypertension Father            • Hyperlipidemia Father    • No Known Problems Brother    • No Known Problems Daughter    • No Known Problems Son    • No Known Problems Maternal Grandmother    • No Known Problems Maternal Grandfather    • No Known Problems Paternal Grandmother    • No Known Problems Paternal Grandfather    • Alcohol abuse Neg Hx    • Substance Abuse Neg Hx    • Mental illness Neg Hx        Social History     Tobacco Use   • Smoking status: Former     Packs/day: 0 25     Years: 10 00     Pack years: 2 50     Types: Cigarettes     Quit date:      Years since quittin 1   • Smokeless tobacco: Never   Vaping Use   • Vaping Use: Never used   Substance Use Topics   • Alcohol use:  Yes     Alcohol/week: 1 0 standard drink     Types: 1 Glasses of wine per week   • Drug use: Never Current Outpatient Medications:   •  amoxicillin (AMOXIL) 500 mg capsule, TAKE 4 CAPSULES BY MOUTH 1 HOUR PRIOR TO PROCEDURE, Disp: , Rfl:   •  Calcium Carbonate-Vit D-Min (CALCIUM 1200 PO), Take by mouth daily, Disp: , Rfl:   •  estradiol (ESTRACE) 0 1 mg/g vaginal cream, Insert 1 g into the vagina 2 (two) times a week, Disp: 42 5 g, Rfl: 0  •  lisinopril (ZESTRIL) 40 mg tablet, Take 1 tablet (40 mg total) by mouth daily, Disp: 90 tablet, Rfl: 3  •  Multiple Vitamins-Minerals (MULTIVITAMIN WOMEN PO), Take 1 tablet by mouth daily , Disp: , Rfl:   •  aspirin 81 mg chewable tablet, Chew 81 mg daily, Disp: , Rfl:   •  cephalexin (KEFLEX) 500 mg capsule, TAKE 4 CAPSULES (500 MG) 1 HOUR PRIOR TO DENTAL PROCEDURE (Patient not taking: Reported on 11/29/2022), Disp: , Rfl:   •  Cholecalciferol (VITAMIN D) 50 MCG (2000 UT) tablet, Take 2,000 Units by mouth daily  (Patient not taking: Reported on 1/3/2023), Disp: , Rfl:     No Known Allergies        Review of Systems   Constitutional: Negative  HENT: Negative  Eyes: Negative  Respiratory: Negative  Cardiovascular: Negative  Gastrointestinal: Negative  Endocrine: Negative  Genitourinary:        As noted in HPI   Musculoskeletal: Negative  Skin: Negative  Allergic/Immunologic: Negative  Neurological: Negative  Hematological: Negative  Psychiatric/Behavioral: Negative  /80 (BP Location: Right arm, Patient Position: Sitting, Cuff Size: Adult)   Pulse 71   Wt 53 1 kg (117 lb)   BMI 20 73 kg/m²         Physical Exam  Constitutional:       Appearance: She is well-developed  Genitourinary:      Vulva, bladder and rectum normal       No lesions in the vagina  Genitourinary Comments:         Right Labia: No rash, tenderness, lesions, skin changes or Bartholin's cyst      Left Labia: No tenderness, lesions, skin changes, Bartholin's cyst or rash  No inguinal adenopathy present in the right or left side       No vaginal discharge, tenderness or bleeding  No vaginal prolapse present  Severe vaginal atrophy present  Right Adnexa: not tender, not full and no mass present  Left Adnexa: not tender, not full and no mass present  No cervical motion tenderness, friability, lesion or polyp  Uterus is not enlarged or tender  Pelvic exam was performed with patient in the lithotomy position  Rectum:      No external hemorrhoid  Breasts:     Right: No mass, nipple discharge, skin change or tenderness  Left: No mass, nipple discharge, skin change or tenderness  HENT:      Head: Normocephalic  Nose: Nose normal    Eyes:      Conjunctiva/sclera: Conjunctivae normal    Neck:      Thyroid: No thyromegaly  Cardiovascular:      Rate and Rhythm: Normal rate and regular rhythm  Heart sounds: Normal heart sounds  No murmur heard  Pulmonary:      Effort: Pulmonary effort is normal  No respiratory distress  Breath sounds: Normal breath sounds  No wheezing or rales  Abdominal:      General: There is no distension  Palpations: Abdomen is soft  There is no mass  Tenderness: There is no abdominal tenderness  There is no guarding or rebound  Musculoskeletal:         General: No tenderness  Cervical back: Neck supple  No muscular tenderness  Lymphadenopathy:      Cervical: No cervical adenopathy  Lower Body: No right inguinal adenopathy  No left inguinal adenopathy  Neurological:      Mental Status: She is alert and oriented to person, place, and time  Skin:     General: Skin is warm and dry     Psychiatric:         Mood and Affect: Mood normal          Behavior: Behavior normal              Future Appointments   Date Time Provider Ashanti Sandy   2/8/2023 11:00 AM QU M[facility]MO W[facility] 1 QU WIC [facility] [place]   4/4/2023 10:00 AM Ac Leyva MD [facility]

## 2023-02-07 ENCOUNTER — ANNUAL EXAM (OUTPATIENT)
Dept: OBGYN CLINIC | Facility: CLINIC | Age: 83
End: 2023-02-07

## 2023-02-07 VITALS
DIASTOLIC BLOOD PRESSURE: 80 MMHG | BODY MASS INDEX: 20.73 KG/M2 | SYSTOLIC BLOOD PRESSURE: 132 MMHG | HEART RATE: 71 BPM | WEIGHT: 117 LBS

## 2023-02-07 DIAGNOSIS — M81.0 AGE-RELATED OSTEOPOROSIS WITHOUT CURRENT PATHOLOGICAL FRACTURE: ICD-10-CM

## 2023-02-07 DIAGNOSIS — Z12.31 ENCOUNTER FOR SCREENING MAMMOGRAM FOR BREAST CANCER: ICD-10-CM

## 2023-02-07 DIAGNOSIS — Z01.411 ENCNTR FOR GYN EXAM (GENERAL) (ROUTINE) W ABNORMAL FINDINGS: Primary | ICD-10-CM

## 2023-02-07 DIAGNOSIS — N95.2 VAGINAL ATROPHY: ICD-10-CM

## 2023-02-07 RX ORDER — ESTRADIOL 0.1 MG/G
1 CREAM VAGINAL 2 TIMES WEEKLY
Qty: 42.5 G | Refills: 4 | Status: SHIPPED | OUTPATIENT
Start: 2023-02-09 | End: 2024-02-09

## 2023-02-08 ENCOUNTER — HOSPITAL ENCOUNTER (OUTPATIENT)
Dept: MAMMOGRAPHY | Facility: IMAGING CENTER | Age: 83
Discharge: HOME | End: 2023-02-08

## 2023-02-08 VITALS — BODY MASS INDEX: 20.73 KG/M2 | HEIGHT: 63 IN | WEIGHT: 117 LBS

## 2023-02-08 DIAGNOSIS — Z12.31 ENCOUNTER FOR SCREENING MAMMOGRAM FOR MALIGNANT NEOPLASM OF BREAST: ICD-10-CM

## 2023-03-16 ENCOUNTER — HOSPITAL ENCOUNTER (OUTPATIENT)
Dept: RADIOLOGY | Facility: HOSPITAL | Age: 83
Discharge: HOME | End: 2023-03-16

## 2023-03-16 DIAGNOSIS — Z96.611 PRESENCE OF RIGHT ARTIFICIAL SHOULDER JOINT: ICD-10-CM

## 2023-03-21 ENCOUNTER — APPOINTMENT (OUTPATIENT)
Dept: LAB | Facility: CLINIC | Age: 83
End: 2023-03-21

## 2023-03-21 DIAGNOSIS — I10 ESSENTIAL HYPERTENSION: ICD-10-CM

## 2023-03-21 DIAGNOSIS — E87.1 HYPONATREMIA: ICD-10-CM

## 2023-03-21 DIAGNOSIS — D75.839 THROMBOCYTOSIS: ICD-10-CM

## 2023-03-21 LAB
ALBUMIN SERPL BCP-MCNC: 3.8 G/DL (ref 3.5–5)
ALP SERPL-CCNC: 78 U/L (ref 46–116)
ALT SERPL W P-5'-P-CCNC: 25 U/L (ref 12–78)
ANION GAP SERPL CALCULATED.3IONS-SCNC: 3 MMOL/L (ref 4–13)
AST SERPL W P-5'-P-CCNC: 23 U/L (ref 5–45)
BASOPHILS # BLD AUTO: 0.03 THOUSANDS/ÂΜL (ref 0–0.1)
BASOPHILS NFR BLD AUTO: 1 % (ref 0–1)
BILIRUB SERPL-MCNC: 0.56 MG/DL (ref 0.2–1)
BUN SERPL-MCNC: 23 MG/DL (ref 5–25)
CALCIUM SERPL-MCNC: 9.8 MG/DL (ref 8.3–10.1)
CHLORIDE SERPL-SCNC: 102 MMOL/L (ref 96–108)
CO2 SERPL-SCNC: 27 MMOL/L (ref 21–32)
CREAT SERPL-MCNC: 0.52 MG/DL (ref 0.6–1.3)
EOSINOPHIL # BLD AUTO: 0.13 THOUSAND/ÂΜL (ref 0–0.61)
EOSINOPHIL NFR BLD AUTO: 3 % (ref 0–6)
ERYTHROCYTE [DISTWIDTH] IN BLOOD BY AUTOMATED COUNT: 13.6 % (ref 11.6–15.1)
GFR SERPL CREATININE-BSD FRML MDRD: 90 ML/MIN/1.73SQ M
GLUCOSE P FAST SERPL-MCNC: 90 MG/DL (ref 65–99)
HCT VFR BLD AUTO: 40.8 % (ref 34.8–46.1)
HGB BLD-MCNC: 13.3 G/DL (ref 11.5–15.4)
IMM GRANULOCYTES # BLD AUTO: 0.01 THOUSAND/UL (ref 0–0.2)
IMM GRANULOCYTES NFR BLD AUTO: 0 % (ref 0–2)
LYMPHOCYTES # BLD AUTO: 1.76 THOUSANDS/ÂΜL (ref 0.6–4.47)
LYMPHOCYTES NFR BLD AUTO: 35 % (ref 14–44)
MCH RBC QN AUTO: 31.9 PG (ref 26.8–34.3)
MCHC RBC AUTO-ENTMCNC: 32.6 G/DL (ref 31.4–37.4)
MCV RBC AUTO: 98 FL (ref 82–98)
MONOCYTES # BLD AUTO: 0.63 THOUSAND/ÂΜL (ref 0.17–1.22)
MONOCYTES NFR BLD AUTO: 13 % (ref 4–12)
NEUTROPHILS # BLD AUTO: 2.48 THOUSANDS/ÂΜL (ref 1.85–7.62)
NEUTS SEG NFR BLD AUTO: 48 % (ref 43–75)
NRBC BLD AUTO-RTO: 0 /100 WBCS
PLATELET # BLD AUTO: 302 THOUSANDS/UL (ref 149–390)
PMV BLD AUTO: 9.6 FL (ref 8.9–12.7)
POTASSIUM SERPL-SCNC: 4.4 MMOL/L (ref 3.5–5.3)
PROT SERPL-MCNC: 6.4 G/DL (ref 6.4–8.4)
RBC # BLD AUTO: 4.17 MILLION/UL (ref 3.81–5.12)
SODIUM SERPL-SCNC: 132 MMOL/L (ref 135–147)
WBC # BLD AUTO: 5.04 THOUSAND/UL (ref 4.31–10.16)

## 2023-03-29 ENCOUNTER — HOSPITAL ENCOUNTER (OUTPATIENT)
Dept: BONE DENSITY | Facility: IMAGING CENTER | Age: 83
Discharge: HOME | End: 2023-03-29

## 2023-03-29 VITALS — BODY MASS INDEX: 21.38 KG/M2 | WEIGHT: 116.2 LBS | HEIGHT: 62 IN

## 2023-03-29 DIAGNOSIS — M81.0 AGE-RELATED OSTEOPOROSIS WITHOUT CURRENT PATHOLOGICAL FRACTURE: ICD-10-CM

## 2023-04-04 ENCOUNTER — OFFICE VISIT (OUTPATIENT)
Dept: FAMILY MEDICINE CLINIC | Facility: CLINIC | Age: 83
End: 2023-04-04

## 2023-04-04 VITALS
RESPIRATION RATE: 16 BRPM | DIASTOLIC BLOOD PRESSURE: 68 MMHG | SYSTOLIC BLOOD PRESSURE: 136 MMHG | BODY MASS INDEX: 21.71 KG/M2 | HEART RATE: 78 BPM | HEIGHT: 62 IN | WEIGHT: 118 LBS

## 2023-04-04 DIAGNOSIS — E04.1 THYROID NODULE: ICD-10-CM

## 2023-04-04 DIAGNOSIS — I10 ESSENTIAL HYPERTENSION: Primary | ICD-10-CM

## 2023-04-04 DIAGNOSIS — E87.1 HYPONATREMIA: ICD-10-CM

## 2023-04-04 DIAGNOSIS — M81.0 AGE-RELATED OSTEOPOROSIS WITHOUT CURRENT PATHOLOGICAL FRACTURE: ICD-10-CM

## 2023-04-04 DIAGNOSIS — Z96.611 HISTORY OF REVISION OF TOTAL REPLACEMENT OF RIGHT SHOULDER JOINT: ICD-10-CM

## 2023-04-04 RX ORDER — LISINOPRIL 40 MG/1
40 TABLET ORAL DAILY
Qty: 90 TABLET | Refills: 3 | Status: SHIPPED | OUTPATIENT
Start: 2023-04-04

## 2023-04-04 NOTE — PROGRESS NOTES
Assessment/Plan:    Essential hypertension  - good control, continue lisinopril 40 mg daily  - lisinopril (ZESTRIL) 40 mg tablet; Take 1 tablet (40 mg total) by mouth daily  Dispense: 90 tablet; Refill: 3  - Comprehensive metabolic panel; Future    2  Osteoporosis   - DEXA scan 3/29/23 showed T-score lumbar spine -1 0, total hip -2 7, femoral neck -2 9, forearm -3 8 and no significant change since prior, patient was seen by Endocrine in June 2021 and recommended Evenity which was denied by her insurance  Discussed other treatment options, however patient declined at this time, advised to continue calcium/ vitamin D supplements and regular weight bearing exercise/ walking and follow up with Endocrine    3  Hyponatremia  - sodium 132, stable, will continue monitoring and recheck prior to next visit in 6 months  - Comprehensive metabolic panel; Future    4  Thyroid nodule  - follow up with Endocrine  - TSH, 3rd generation with Free T4 reflex; Future    5  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  - follow up with Ortho       Return in 6 months or sooner as needed  The patient understands and agrees with the treatment plan  Subjective:   Chief Complaint   Patient presents with   • Hypertension      Patient ID: Roxie Gillespie is a [de-identified] y o  female who presents today for follow up visit for hypertension, osteoporosis and review her lab and DEXA scan results  Patient is s/p right shoulder surgery: revision to reverse total shoulder arthroplasty on 9/7/22, she completed physical therapy and states her right shoulder pain has significantly improved, but still having limited ROM  Patient offers no other complaints, she reports good home BP readings in 120's-130's/ 70's          The following portions of the patient's history were reviewed and updated as appropriate: allergies, current medications, past family history, past medical history, past social history, past surgical history and problem list     Past Medical History:   Diagnosis Date   • Arthritis    • Basal cell carcinoma    • Disease of thyroid gland     thyroid nodules   • Dry eye    • Hard of hearing    • Hematuria    • Hypertension    • Leaking of urine    • Osteoarthritis    • Peritonitis ([address] )     in age 44's due to ruptured apendix   • Shoulder joint pain     right- reverse arthroplasty today 2020   • Squamous cell skin cancer    • Wears glasses    • Wears partial dentures     upper     Past Surgical History:   Procedure Laterality Date   • APPENDECTOMY     • BREAST BIOPSY Right 02/15/2021    stereo, benign   • CHOLECYSTECTOMY LAPAROSCOPIC     • COLONOSCOPY     • HIP SURGERY     • JOINT REPLACEMENT Right     hip   • LAPAROSCOPIC CHOLECYSTECTOMY     • LAPAROSCOPY     • MAMMO STEREOTACTIC BREAST BIOPSY RIGHT (ALL INC) Right 02/15/2021   • [place] ARTHROPLASTY GLENOHUMERAL JOINT TOTAL SHOULDER Right 2020    Procedure: ARTHROPLASTY SHOULDER REVERSE;  Surgeon: Ashlyn Teran MD;  Location: [facility];  Service: Orthopedics   • SHOULDER SURGERY Right 2022   • TONSILLECTOMY     • TUBAL LIGATION     • WISDOM TOOTH EXTRACTION       Family History   Problem Relation Age of Onset   • Hypertension Mother            • Coronary artery disease Father    • Hypertension Father            • Hyperlipidemia Father    • No Known Problems Brother    • No Known Problems Daughter    • No Known Problems Son    • No Known Problems Maternal Grandmother    • No Known Problems Maternal Grandfather    • No Known Problems Paternal Grandmother    • No Known Problems Paternal Grandfather    • Alcohol abuse Neg Hx    • Substance Abuse Neg Hx    • Mental illness Neg Hx      Social History     Socioeconomic History   • Marital status: /Civil Union     Spouse name: Not on file   • Number of children: Not on file   • Years of education: Not on file   • Highest education level: Not on file   Occupational History   • Not on file   Tobacco Use   • Smoking status: Former     Packs/day: 0 25     Years: 10 00     Pack years: 2 50     Types: Cigarettes     Quit date:      Years since quittin 2   • Smokeless tobacco: Never   Vaping Use   • Vaping Use: Never used   Substance and Sexual Activity   • Alcohol use: Yes     Alcohol/week: 1 0 standard drink     Types: 1 Glasses of wine per week   • Drug use: Never   • Sexual activity: Not Currently     Partners: Male     Birth control/protection: None   Other Topics Concern   • Not on file   Social History Narrative   • Not on file     Social Determinants of Health     Financial Resource Strain: Not on file   Food Insecurity: Not on file   Transportation Needs: Not on file   Physical Activity: Not on file   Stress: Not on file   Social Connections: Not on file   Intimate Partner Violence: Not on file   Housing Stability: Not on file       Current Outpatient Medications:   •  amoxicillin (AMOXIL) 500 mg capsule, TAKE 4 CAPSULES BY MOUTH 1 HOUR PRIOR TO PROCEDURE, Disp: , Rfl:   •  Calcium Carbonate-Vit D-Min (CALCIUM 1200 PO), Take by mouth 2 (two) times a day, Disp: , Rfl:   •  Cholecalciferol (VITAMIN D) 50 MCG (2000 UT) tablet, Take 2,000 Units by mouth daily, Disp: , Rfl:   •  estradiol (ESTRACE) 0 1 mg/g vaginal cream, Insert 1 g into the vagina 2 (two) times a week, Disp: 42 5 g, Rfl: 4  •  lisinopril (ZESTRIL) 40 mg tablet, Take 1 tablet (40 mg total) by mouth daily, Disp: 90 tablet, Rfl: 3  •  Multiple Vitamins-Minerals (MULTIVITAMIN WOMEN PO), Take 1 tablet by mouth daily , Disp: , Rfl:   •  aspirin 81 mg chewable tablet, Chew 81 mg daily, Disp: , Rfl:   •  cephalexin (KEFLEX) 500 mg capsule, TAKE 4 CAPSULES (500 MG) 1 HOUR PRIOR TO DENTAL PROCEDURE (Patient not taking: Reported on 2022), Disp: , Rfl:     Review of Systems   Constitutional: Negative for appetite change, chills, fatigue, fever and unexpected weight change  Respiratory: Negative for cough, shortness of breath and wheezing  "  Cardiovascular: Negative for chest pain, palpitations and leg swelling  Gastrointestinal: Negative for abdominal pain, blood in stool, constipation, diarrhea, nausea and vomiting  Genitourinary: Negative for dysuria, flank pain, frequency, pelvic pain and urgency  Musculoskeletal:        Right shoulder pain   Neurological: Negative for dizziness, syncope, weakness, numbness and headaches  Hematological: Negative for adenopathy  Psychiatric/Behavioral: Negative for dysphoric mood and sleep disturbance  The patient is not nervous/anxious  Objective:    Vitals:    04/04/23 1005   BP: 143/69   Pulse: 78   Resp: 16   Weight: 53 5 kg (118 lb)   Height: 5' 2\" (1 575 m)        Physical Exam  Constitutional:       General: She is not in acute distress  Appearance: She is well-developed  Cardiovascular:      Rate and Rhythm: Normal rate and regular rhythm  Heart sounds: Normal heart sounds  No murmur heard  Pulmonary:      Effort: Pulmonary effort is normal  No respiratory distress  Breath sounds: Normal breath sounds  No wheezing, rhonchi or rales  Abdominal:      General: There is no distension  Palpations: Abdomen is soft  There is no mass  Tenderness: There is no abdominal tenderness  Musculoskeletal:      Cervical back: Neck supple  Right lower leg: No edema  Left lower leg: No edema  Lymphadenopathy:      Cervical: No cervical adenopathy  Neurological:      Mental Status: She is alert and oriented to person, place, and time  Psychiatric:         Mood and Affect: Mood normal          Behavior: Behavior normal          Thought Content:  Thought content normal         Lab Results   Component Value Date    SODIUM 132 (L) 03/21/2023    K 4 4 03/21/2023     03/21/2023    CO2 27 03/21/2023    AGAP 3 (L) 03/21/2023    BUN 23 03/21/2023    CREATININE 0 52 (L) 03/21/2023    GLUC 101 09/03/2022    GLUF 90 03/21/2023    CALCIUM 9 8 03/21/2023    AST 23 " 03/21/2023    ALT 25 03/21/2023    ALKPHOS 78 03/21/2023    TP 6 4 03/21/2023    TBILI 0 56 03/21/2023    EGFR 90 03/21/2023     Lab Results   Component Value Date    WBC 5 04 03/21/2023    HGB 13 3 03/21/2023    HCT 40 8 03/21/2023    MCV 98 03/21/2023     03/21/2023     Lab Results   Component Value Date    QQB2DMOQCHPU 2 110 09/28/2022     Lab Results   Component Value Date    CHOLESTEROL 150 09/28/2022    CHOLESTEROL 196 09/29/2021     Lab Results   Component Value Date    HDL 77 09/28/2022    HDL 99 09/29/2021     Lab Results   Component Value Date    TRIG 52 09/28/2022    TRIG 52 09/29/2021     Lab Results   Component Value Date    LDLCALC 63 09/28/2022

## 2023-04-06 ENCOUNTER — CLINICAL SUPPORT (OUTPATIENT)
Dept: FAMILY MEDICINE CLINIC | Facility: CLINIC | Age: 83
End: 2023-04-06

## 2023-04-06 DIAGNOSIS — Z23 NEED FOR VACCINATION: Primary | ICD-10-CM

## 2023-06-13 ENCOUNTER — HOSPITAL ENCOUNTER (OUTPATIENT)
Dept: RADIOLOGY | Facility: HOSPITAL | Age: 83
Discharge: HOME | End: 2023-06-13
Payer: COMMERCIAL

## 2023-06-13 DIAGNOSIS — T84.038D MECHANICAL LOOSENING OF OTHER INTERNAL PROSTHETIC JOINT, SUBSEQUENT ENCOUNTER: ICD-10-CM

## 2023-06-13 DIAGNOSIS — Z96.611 PRESENCE OF RIGHT ARTIFICIAL SHOULDER JOINT: ICD-10-CM

## 2023-06-13 PROCEDURE — 73030 X-RAY EXAM OF SHOULDER: CPT

## 2023-09-12 ENCOUNTER — HOSPITAL ENCOUNTER (OUTPATIENT)
Dept: RADIOLOGY | Facility: HOSPITAL | Age: 83
Discharge: HOME | End: 2023-09-12
Payer: COMMERCIAL

## 2023-09-12 DIAGNOSIS — Z96.611 PRESENCE OF RIGHT ARTIFICIAL SHOULDER JOINT: ICD-10-CM

## 2023-09-12 PROCEDURE — 73030 X-RAY EXAM OF SHOULDER: CPT

## 2023-09-25 ENCOUNTER — HOSPITAL ENCOUNTER (OUTPATIENT)
Dept: RADIOLOGY | Facility: HOSPITAL | Age: 83
Discharge: HOME | End: 2023-09-25
Payer: COMMERCIAL

## 2023-09-25 DIAGNOSIS — Z96.611 PRESENCE OF RIGHT ARTIFICIAL SHOULDER JOINT: ICD-10-CM

## 2023-09-25 PROCEDURE — 73020 X-RAY EXAM OF SHOULDER: CPT

## 2023-10-02 ENCOUNTER — RA CDI HCC (OUTPATIENT)
Dept: OTHER | Facility: HOSPITAL | Age: 83
End: 2023-10-02

## 2023-10-02 NOTE — PROGRESS NOTES
[address] [facility] coding opportunities       Chart reviewed, no opportunity found: [id number]        Patients Insurance     Medicare Insurance: [employer]

## 2023-10-04 ENCOUNTER — APPOINTMENT (OUTPATIENT)
Dept: LAB | Facility: CLINIC | Age: 83
End: 2023-10-04
Payer: COMMERCIAL

## 2023-10-04 DIAGNOSIS — I10 ESSENTIAL HYPERTENSION: ICD-10-CM

## 2023-10-04 DIAGNOSIS — E04.1 THYROID NODULE: ICD-10-CM

## 2023-10-04 DIAGNOSIS — E87.1 HYPONATREMIA: ICD-10-CM

## 2023-10-04 LAB
ALBUMIN SERPL BCP-MCNC: 4 G/DL (ref 3.5–5)
ALP SERPL-CCNC: 55 U/L (ref 34–104)
ALT SERPL W P-5'-P-CCNC: 21 U/L (ref 7–52)
ANION GAP SERPL CALCULATED.3IONS-SCNC: 8 MMOL/L
AST SERPL W P-5'-P-CCNC: 27 U/L (ref 13–39)
BILIRUB SERPL-MCNC: 0.62 MG/DL (ref 0.2–1)
BUN SERPL-MCNC: 25 MG/DL (ref 5–25)
CALCIUM SERPL-MCNC: 9.4 MG/DL (ref 8.4–10.2)
CHLORIDE SERPL-SCNC: 99 MMOL/L (ref 96–108)
CO2 SERPL-SCNC: 28 MMOL/L (ref 21–32)
CREAT SERPL-MCNC: 0.62 MG/DL (ref 0.6–1.3)
GFR SERPL CREATININE-BSD FRML MDRD: 84 ML/MIN/1.73SQ M
GLUCOSE P FAST SERPL-MCNC: 96 MG/DL (ref 65–99)
POTASSIUM SERPL-SCNC: 4.8 MMOL/L (ref 3.5–5.3)
PROT SERPL-MCNC: 6.1 G/DL (ref 6.4–8.4)
SODIUM SERPL-SCNC: 135 MMOL/L (ref 135–147)
TSH SERPL DL<=0.05 MIU/L-ACNC: 1.61 UIU/ML (ref 0.45–4.5)

## 2023-10-04 PROCEDURE — 80053 COMPREHEN METABOLIC PANEL: CPT

## 2023-10-04 PROCEDURE — 84443 ASSAY THYROID STIM HORMONE: CPT

## 2023-10-04 PROCEDURE — 36415 COLL VENOUS BLD VENIPUNCTURE: CPT

## 2023-10-10 ENCOUNTER — OFFICE VISIT (OUTPATIENT)
Dept: FAMILY MEDICINE CLINIC | Facility: CLINIC | Age: 83
End: 2023-10-10
Payer: COMMERCIAL

## 2023-10-10 VITALS
RESPIRATION RATE: 17 BRPM | WEIGHT: 118.6 LBS | BODY MASS INDEX: 22.39 KG/M2 | TEMPERATURE: 97.3 F | OXYGEN SATURATION: 98 % | HEIGHT: 61 IN | SYSTOLIC BLOOD PRESSURE: 138 MMHG | HEART RATE: 80 BPM | DIASTOLIC BLOOD PRESSURE: 78 MMHG

## 2023-10-10 DIAGNOSIS — E04.1 THYROID NODULE: ICD-10-CM

## 2023-10-10 DIAGNOSIS — M81.0 AGE-RELATED OSTEOPOROSIS WITHOUT CURRENT PATHOLOGICAL FRACTURE: ICD-10-CM

## 2023-10-10 DIAGNOSIS — E87.1 HYPONATREMIA: ICD-10-CM

## 2023-10-10 DIAGNOSIS — Z12.31 ENCOUNTER FOR SCREENING MAMMOGRAM FOR MALIGNANT NEOPLASM OF BREAST: ICD-10-CM

## 2023-10-10 DIAGNOSIS — I10 ESSENTIAL HYPERTENSION: ICD-10-CM

## 2023-10-10 DIAGNOSIS — Z00.00 MEDICARE ANNUAL WELLNESS VISIT, SUBSEQUENT: Primary | ICD-10-CM

## 2023-10-10 PROCEDURE — 99214 OFFICE O/P EST MOD 30 MIN: CPT | Performed by: FAMILY MEDICINE

## 2023-10-10 PROCEDURE — G0439 PPPS, SUBSEQ VISIT: HCPCS | Performed by: FAMILY MEDICINE

## 2023-10-10 NOTE — PROGRESS NOTES
Assessment and Plan:     Medicare annual wellness visit, subsequent  - discussed td and Covid booster  - Mammo screening bilateral w 3d & cad; Future    Essential hypertension  - well controlled, continue lisinopril 40 mg daily  - Lipid Panel with Direct LDL reflex; Future  - Comprehensive metabolic panel; Future  - CBC and differential; Future    Hyponatremia  - sodium 135, within normal range, will continue monitoring and recheck lab work in 6 months  - Comprehensive metabolic panel; Future    Osteoporosis   - DEXA scan 3/29/23 showed T-score lumbar spine -1.0, total hip -2.7, femoral neck -2.9, forearm -3.8 and no significant changes since prior 2/3/21, again dscussed treatment options, advised to continue calcium/ vitamin D supplements and regular weight bearing exercise/ walking and follow up with Endocrine, will repeat DEXA scan in March 2025    Thyroid nodule  - TSH 1.610, within normal range, will repeat thyroid US  - US thyroid; Future    Depression Screening and Follow-up Plan: Patient was screened for depression during today's encounter. They screened negative with a PHQ-2 score of 0. Return in 6 months or sooner as needed. Preventive health issues were discussed with patient, and age appropriate screening tests were ordered as noted in patient's After Visit Summary. Personalized health advice and appropriate referrals for health education or preventive services given if needed, as noted in patient's After Visit Summary. History of Present Illness:     Patient presents for a Medicare Wellness Visit and follow up for hypertension. Patient states she is doing well and offers no complaints at this time, she reports good home BP readings in 120's-130's/ 70's.         Patient Care Team:  Shaji Wagner MD as PCP - General (Family Medicine)  Renny Martinez MD as PCP - Endocrinology (Endocrinology)     Review of Systems:     Review of Systems   Constitutional: Negative for appetite change, chills, fatigue, fever and unexpected weight change. Respiratory: Negative for cough, shortness of breath and wheezing. Cardiovascular: Negative for chest pain, palpitations and leg swelling. Gastrointestinal: Negative for abdominal pain, blood in stool, constipation, diarrhea, nausea and vomiting. Genitourinary: Negative for dysuria, flank pain, frequency, pelvic pain and urgency. Musculoskeletal: Negative for back pain and neck pain. Neurological: Negative for dizziness, syncope, weakness, numbness and headaches. Hematological: Negative for adenopathy. Psychiatric/Behavioral: Negative for dysphoric mood and sleep disturbance. The patient is not nervous/anxious.          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      Past Medical and Surgical History:     Past Medical History:   Diagnosis Date   • Arthritis    • Basal cell carcinoma    • Disease of thyroid gland     thyroid nodules   • Dry eye    • Hard of hearing    • Hematuria    • Hypertension    • Leaking of urine    • Osteoarthritis    • Peritonitis ([address])     in age 44's due to ruptured apendix   • Shoulder joint pain     right- reverse arthroplasty today 8/25/2020   • Squamous cell skin cancer    • Wears glasses    • Wears partial dentures     upper     Past Surgical History:   Procedure Laterality Date   • APPENDECTOMY     • BREAST BIOPSY Right 02/15/2021    stereo, benign   • CHOLECYSTECTOMY LAPAROSCOPIC     • COLONOSCOPY     • HIP SURGERY     • JOINT REPLACEMENT Right     hip   • LAPAROSCOPIC CHOLECYSTECTOMY     • LAPAROSCOPY     • MAMMO STEREOTACTIC BREAST BIOPSY RIGHT (ALL INC) Right 02/15/2021   • [place] ARTHROPLASTY GLENOHUMERAL JOINT TOTAL SHOULDER Right 2020    Procedure: ARTHROPLASTY SHOULDER REVERSE;  Surgeon: Beau Junior MD;  Location: [facility];  Service: Orthopedics   • SHOULDER SURGERY Right 2022   • TONSILLECTOMY     • TUBAL LIGATION     • WISDOM TOOTH EXTRACTION        Family History:     Family History   Problem Relation Age of Onset   • Hypertension Mother            • Coronary artery disease Father    • Hypertension Father            • Hyperlipidemia Father    • No Known Problems Brother    • No Known Problems Daughter    • No Known Problems Son    • No Known Problems Maternal Grandmother    • No Known Problems Maternal Grandfather    • No Known Problems Paternal Grandmother    • No Known Problems Paternal Grandfather    • Alcohol abuse Neg Hx    • Substance Abuse Neg Hx    • Mental illness Neg Hx       Social History:     Social History     Socioeconomic History   • Marital status: /Civil Union     Spouse name: None   • Number of children: None   • Years of education: None   • Highest education level: None   Occupational History   • None   Tobacco Use   • Smoking status: Former     Packs/day: 0.25     Years: 10.00     Total pack years: 2.50     Types: Cigarettes     Quit date:      Years since quittin.7   • Smokeless tobacco: Never   Vaping Use   • Vaping Use: Never used   Substance and Sexual Activity   • Alcohol use:  Yes     Alcohol/week: 1.0 standard drink of alcohol     Types: 1 Glasses of wine per week   • Drug use: Never   • Sexual activity: Not Currently     Partners: Male     Birth control/protection: None   Other Topics Concern   • None   Social History Narrative   • None     Social Determinants of Health     Financial Resource Strain: Low Risk  (10/3/2023)    Overall Financial Resource Strain (CARDIA)    • Difficulty of Paying Living Expenses: Not hard at all   Food Insecurity: Not on file Transportation Needs: No Transportation Needs (10/3/2023)    PRAPARE - Transportation    • Lack of Transportation (Medical): No    • Lack of Transportation (Non-Medical): No   Physical Activity: Not on file   Stress: Not on file   Social Connections: Not on file   Intimate Partner Violence: Not on file   Housing Stability: Not on file      Medications and Allergies:     Current Outpatient Medications   Medication Sig Dispense Refill   • amoxicillin (AMOXIL) 500 mg capsule TAKE 4 CAPSULES BY MOUTH 1 HOUR PRIOR TO PROCEDURE     • Calcium Carbonate-Vit D-Min (CALCIUM 1200 PO) Take by mouth 2 (two) times a day     • Cholecalciferol (VITAMIN D) 50 MCG (2000 UT) tablet Take 2,000 Units by mouth daily     • estradiol (ESTRACE) 0.1 mg/g vaginal cream Insert 1 g into the vagina 2 (two) times a week 42.5 g 4   • lisinopril (ZESTRIL) 40 mg tablet Take 1 tablet (40 mg total) by mouth daily 90 tablet 3   • Multiple Vitamins-Minerals (MULTIVITAMIN WOMEN PO) Take 1 tablet by mouth daily        No current facility-administered medications for this visit.      No Known Allergies   Immunizations:     Immunization History   Administered Date(s) Administered   • COVID-19 MODERNA VACC 0.5 ML IM 01/27/2021, 02/24/2021, 10/26/2021, 05/31/2022   • COVID-19 Moderna Vac BIVALENT 12 Yr+ IM 0.5 ML 12/02/2022   • INFLUENZA 09/24/2014, 10/02/2017, 09/29/2021, 09/27/2022   • Influenza Split High Dose Preservative Free IM 10/11/2018, 09/30/2019   • Influenza, Seasonal Vaccine, Quadrivalent, Adjuvanted, .5e 09/28/2023   • Pneumococcal Conjugate Vaccine 20-valent (Pcv20), Polysace 04/06/2023   • Zoster Vaccine Recombinant 07/28/2020   • influenza, trivalent, adjuvanted 09/23/2020      Health Maintenance:         Topic Date Due   • Cervical Cancer Screening  01/26/2023   • Breast Cancer Screening: Mammogram  02/08/2024   • DXA SCAN  03/29/2025         Topic Date Due   • COVID-19 Vaccine (6 - Moderna series) 04/02/2023   • Influenza Vaccine (1) 09/01/2023      Medicare Screening Tests and Risk Assessments: Chet Seaman is here for her Subsequent Wellness visit. Health Risk Assessment:   Patient rates overall health as good. Patient feels that their physical health rating is slightly better. Patient is very satisfied with their life. Eyesight was rated as same. Hearing was rated as same. Patient feels that their emotional and mental health rating is same. Patients states they are never, rarely angry. Patient states they are sometimes unusually tired/fatigued. Pain experienced in the last 7 days has been some. Patient's pain rating has been 3/10. Patient states that she has experienced no weight loss or gain in last 6 months. Depression Screening:   PHQ-2 Score: 0      Fall Risk Screening: In the past year, patient has experienced: no history of falling in past year      Urinary Incontinence Screening:   Patient has not leaked urine accidently in the last six months. Home Safety:  Patient does not have trouble with stairs inside or outside of their home. Patient has working smoke alarms and has working carbon monoxide detector. Home safety hazards include: none. Nutrition:   Current diet is Regular. Medications:   Patient is currently taking over-the-counter supplements. OTC medications include: Aleve. Patient is able to manage medications. Activities of Daily Living (ADLs)/Instrumental Activities of Daily Living (IADLs):   Walk and transfer into and out of bed and chair?: Yes  Dress and groom yourself?: Yes    Bathe or shower yourself?: Yes    Feed yourself? Yes  Do your laundry/housekeeping?: Yes  Manage your money, pay your bills and track your expenses?: Yes  Make your own meals?: Yes    Do your own shopping?: Yes    Previous Hospitalizations:   Any hospitalizations or ED visits within the last 12 months?: No      Advance Care Planning:   Living will: Yes    Durable POA for healthcare:  Yes    Advanced directive: Yes      Cognitive Screening:   Provider or family/friend/caregiver concerned regarding cognition?: No    PREVENTIVE SCREENINGS      Cardiovascular Screening:    General: Screening Current      Diabetes Screening:     General: Screening Current      Colorectal Cancer Screening:     General: Patient Declines      Breast Cancer Screening:     General: Screening Current      Cervical Cancer Screening:    General: Screening Not Indicated      Osteoporosis Screening:    General: Screening Not Indicated and History Osteoporosis      Abdominal Aortic Aneurysm (AAA) Screening:        General: Screening Not Indicated      Lung Cancer Screening:     General: Screening Not Indicated      Hepatitis C Screening:    General: Patient Declines    Screening, Brief Intervention, and Referral to Treatment (SBIRT)    Screening  Typical number of drinks in a day: 1  Typical number of drinks in a week: 4  Interpretation: Low risk drinking behavior. AUDIT-C Screenin) How often did you have a drink containing alcohol in the past year? 4 or more times a week  2) How many drinks did you have on a typical day when you were drinking in the past year? 3 to 4  3) How often did you have 6 or more drinks on one occasion in the past year? never    AUDIT-C Score: 4  Interpretation: Score 3-12 (female): POSITIVE screen for alcohol misuse    AUDIT Screenin) How often during the last year have you found that you were not able to stop drinking once you had started? 0 - never  5) How often during the last year have you failed to do what was normally expected from you because of drinking? 0 - never  6) How often during the last year have you needed a first drink in the morning to get yourself going after a heavy drinking session?  0 - never  7) How often during the last year have you had a feeling of guilt or remorse after drinking? 0 - never  8) How often during the last year have you been unable to remember what happened the night before because you had been drinking? 0 - never  9) Have you or someone else been injured as a result of your drinking? 0 - no  10) Has a relative or friend or a doctor or another health worker been concerned about your drinking or suggested you cut down? 0 - no    AUDIT Score: 4  Interpretation: Low risk alcohol consumption    Single Item Drug Screening:  How often have you used an illegal drug (including marijuana) or a prescription medication for non-medical reasons in the past year? never    Single Item Drug Screen Score: 0  Interpretation: Negative screen for possible drug use disorder       Physical Exam:     /78   Pulse 80   Temp (!) 97.3 °F (36.3 °C)   Resp 17   Ht 5' 1.25" (1.556 m)   Wt 53.8 kg (118 lb 9.6 oz)   SpO2 98%   BMI 22.23 kg/m²     Physical Exam  Constitutional:       General: She is not in acute distress. Cardiovascular:      Rate and Rhythm: Normal rate and regular rhythm. Pulmonary:      Effort: Pulmonary effort is normal. No respiratory distress. Breath sounds: Normal breath sounds. No wheezing, rhonchi or rales. Abdominal:      General: There is no distension. Palpations: Abdomen is soft. There is no mass. Tenderness: There is no abdominal tenderness. Musculoskeletal:      Cervical back: Neck supple. Right lower leg: No edema. Left lower leg: No edema. Lymphadenopathy:      Cervical: No cervical adenopathy. Neurological:      Mental Status: She is alert and oriented to person, place, and time.    Psychiatric:         Mood and Affect: Mood normal.         Behavior: Behavior normal.          Lab Results   Component Value Date    WBC 5.04 03/21/2023    HGB 13.3 03/21/2023    HCT 40.8 03/21/2023    MCV 98 03/21/2023     03/21/2023     Lab Results   Component Value Date    [id number] 1.610 10/04/2023     Lab Results   Component Value Date    SODIUM 135 10/04/2023    K 4.8 10/04/2023    CL 99 10/04/2023    CO2 28 10/04/2023    AGAP 8 10/04/2023    BUN 25 10/04/2023 CREATININE 0.62 10/04/2023    GLUC 101 09/03/2022    GLUF 96 10/04/2023    CALCIUM 9.4 10/04/2023    AST 27 10/04/2023    ALT 21 10/04/2023    ALKPHOS 55 10/04/2023    TP 6.1 (L) 10/04/2023    TBILI 0.62 10/04/2023    EGFR 84 10/04/2023     Lab Results   Component Value Date    CHOLESTEROL 150 09/28/2022    CHOLESTEROL 196 09/29/2021     Lab Results   Component Value Date    HDL 77 09/28/2022    HDL 99 09/29/2021     Lab Results   Component Value Date    TRIG 52 09/28/2022    TRIG 52 09/29/2021     Lab Results   Component Value Date    LDLCALC 63 09/28/2022       Valarie Prader, MD

## 2023-10-10 NOTE — PATIENT INSTRUCTIONS
Medicare Preventive Visit Patient Instructions  Thank you for completing your Welcome to Medicare Visit or Medicare Annual Wellness Visit today. Your next wellness visit will be due in one year (10/10/2024). The screening/preventive services that you may require over the next 5-10 years are detailed below. Some tests may not apply to you based off risk factors and/or age. Screening tests ordered at today's visit but not completed yet may show as past due. Also, please note that scanned in results may not display below. Preventive Screenings:  Service Recommendations Previous Testing/Comments   Colorectal Cancer Screening  * Colonoscopy    * Fecal Occult Blood Test (FOBT)/Fecal Immunochemical Test (FIT)  * Fecal DNA/Cologuard Test  * Flexible Sigmoidoscopy Age: 43-73 years old   Colonoscopy: every 10 years (may be performed more frequently if at higher risk)  OR  FOBT/FIT: every 1 year  OR  Cologuard: every 3 years  OR  Sigmoidoscopy: every 5 years  Screening may be recommended earlier than age 39 if at higher risk for colorectal cancer. Also, an individualized decision between you and your healthcare provider will decide whether screening between the ages of 77-80 would be appropriate. Colonoscopy: Not on file  FOBT/FIT: Not on file  Cologuard: Not on file  Sigmoidoscopy: Not on file          Breast Cancer Screening Age: 36 years old  Frequency: every 1-2 years  Not required if history of left and right mastectomy Mammogram: 02/08/2023    Screening Current   Cervical Cancer Screening Between the ages of 21-29, pap smear recommended once every 3 years. Between the ages of 32-69, can perform pap smear with HPV co-testing every 5 years.    Recommendations may differ for women with a history of total hysterectomy, cervical cancer, or abnormal pap smears in past. Pap Smear: 02/07/2023    Screening Not Indicated   Hepatitis C Screening Once for adults born between 1945 and 1965  More frequently in patients at high risk for Hepatitis C Hep C Antibody: Not on file        Diabetes Screening 1-2 times per year if you're at risk for diabetes or have pre-diabetes Fasting glucose: 96 mg/dL (10/4/2023)  A1C: 5.4 % (8/11/2020)  Screening Current   Cholesterol Screening Once every 5 years if you don't have a lipid disorder. May order more often based on risk factors. Lipid panel: 09/28/2022    Screening Current     Other Preventive Screenings Covered by Medicare:  1. Abdominal Aortic Aneurysm (AAA) Screening: covered once if your at risk. You're considered to be at risk if you have a family history of AAA. 2. Lung Cancer Screening: covers low dose CT scan once per year if you meet all of the following conditions: (1) Age 48-67; (2) No signs or symptoms of lung cancer; (3) Current smoker or have quit smoking within the last 15 years; (4) You have a tobacco smoking history of at least 20 pack years (packs per day multiplied by number of years you smoked); (5) You get a written order from a healthcare provider. 3. Glaucoma Screening: covered annually if you're considered high risk: (1) You have diabetes OR (2) Family history of glaucoma OR (3)  aged 48 and older OR (3)  American aged 72 and older  3. Osteoporosis Screening: covered every 2 years if you meet one of the following conditions: (1) You're estrogen deficient and at risk for osteoporosis based off medical history and other findings; (2) Have a vertebral abnormality; (3) On glucocorticoid therapy for more than 3 months; (4) Have primary hyperparathyroidism; (5) On osteoporosis medications and need to assess response to drug therapy. · Last bone density test (DXA Scan): 03/29/2023.  5. HIV Screening: covered annually if you're between the age of 15-65. Also covered annually if you are younger than 13 and older than 72 with risk factors for HIV infection. For pregnant patients, it is covered up to 3 times per pregnancy.     Immunizations:  Immunization Recommendations   Influenza Vaccine Annual influenza vaccination during flu season is recommended for all persons aged >= 6 months who do not have contraindications   Pneumococcal Vaccine   * Pneumococcal conjugate vaccine = PCV13 (Prevnar 13), PCV15 (Vaxneuvance), PCV20 (Prevnar 20)  * Pneumococcal polysaccharide vaccine = PPSV23 (Pneumovax) Adults 70-32 yo with certain risk factors or if 65+ yo  · If never received any pneumonia vaccine: recommend Prevnar 20 (PCV20)  · Give PCV20 if previously received 1 dose of PCV13 or PPSV23   Hepatitis B Vaccine 3 dose series if at intermediate or high risk (ex: diabetes, end stage renal disease, liver disease)   Respiratory syncytial virus (RSV) Vaccine - COVERED BY MEDICARE PART D  * RSVPreF3 (Arexvy) CDC recommends that adults 61years of age and older may receive a single dose of RSV vaccine using shared clinical decision-making (SCDM)   Tetanus (Td) Vaccine - COST NOT COVERED BY MEDICARE PART B Following completion of primary series, a booster dose should be given every 10 years to maintain immunity against tetanus. Td may also be given as tetanus wound prophylaxis. Tdap Vaccine - COST NOT COVERED BY MEDICARE PART B Recommended at least once for all adults. For pregnant patients, recommended with each pregnancy. Shingles Vaccine (Shingrix) - COST NOT COVERED BY MEDICARE PART B  2 shot series recommended in those 19 years and older who have or will have weakened immune systems or those 50 years and older     Health Maintenance Due:      Topic Date Due   • Cervical Cancer Screening  01/26/2023   • Breast Cancer Screening: Mammogram  02/08/2024   • DXA SCAN  03/29/2025     Immunizations Due:      Topic Date Due   • COVID-19 Vaccine (6 - Moderna series) 04/02/2023   • Influenza Vaccine (1) 09/01/2023     Advance Directives   What are advance directives? Advance directives are legal documents that state your wishes and plans for medical care.  These plans are made ahead of time in case you lose your ability to make decisions for yourself. Advance directives can apply to any medical decision, such as the treatments you want, and if you want to donate organs. What are the types of advance directives? There are many types of advance directives, and each state has rules about how to use them. You may choose a combination of any of the following:  · Living will: This is a written record of the treatment you want. You can also choose which treatments you do not want, which to limit, and which to stop at a certain time. This includes surgery, medicine, IV fluid, and tube feedings. · Durable power of  for healthcare [facility] [place]): This is a written record that states who you want to make healthcare choices for you when you are unable to make them for yourself. This person, called a proxy, is usually a family member or a friend. You may choose more than 1 proxy. · Do not resuscitate (DNR) order:  A DNR order is used in case your heart stops beating or you stop breathing. It is a request not to have certain forms of treatment, such as CPR. A DNR order may be included in other types of advance directives. · Medical directive: This covers the care that you want if you are in a coma, near death, or unable to make decisions for yourself. You can list the treatments you want for each condition. Treatment may include pain medicine, surgery, blood transfusions, dialysis, IV or tube feedings, and a ventilator (breathing machine). · Values history: This document has questions about your views, beliefs, and how you feel and think about life. This information can help others choose the care that you would choose. Why are advance directives important? An advance directive helps you control your care. Although spoken wishes may be used, it is better to have your wishes written down. Spoken wishes can be misunderstood, or not followed. Treatments may be given even if you do not want them.  An advance directive may make it easier for your family to make difficult choices about your care. Alcohol Use and Your Health    Drinking too much can harm your health. Excessive alcohol use leads to about 88,000 death in [facility] each year, and shortens the life of those who diet by almost 30 years. Further, excessive drinking cost the economy $249 billion in 2010. Most excessive drinkers are not alcohol dependent. Excessive alcohol use has immediate effects that increase the risk of many harmful health conditions. These are most often the result of binge drinking. Over time, excessive alcohol use can lead to the development of chronic diseases and other series health problems. What is considered a "drink"? Excessive alcohol use includes:  · Binge Drinking: For women, 4 or more drinks consumed on one occasion. For men, 5 or more drinks consumed on one occasion. · Heavy Drinking: For women, 8 or more drinks per week. For men, 15 or more drinks per week  · Any alcohol used by pregnant women  · Any alcohol used by those under the age of 21 years    If you choose to drink, do so in moderation:  · Do not drink at all if you are under the age of 24, or if you are or may be pregnant, or have health problems that could be made worse by drinking.   · For women, up to 1 drink per day  · For men, up to 2 drinks a day    No one should begin drinking or drink more frequently based on potential health benefits    Short-Term Health Risks:  · Injuries: motor vehicle crashes, falls, drownings, burns  · Violence: homicide, suicide, sexual assault, intimate partner violence  · Alcohol poisoning  · Reproductive health: risky sexual behaviors, unintended prengnacy, sexually transmitted diseases, miscarriage, stillbirth, fetal alcohol syndrome    Long-Term Health Risks:  · Chronic diseases: high blood pressure, heart disease, stroke, liver disease, digestive problems  · Cancers: breast, mouth and throat, liver, colon  · Learning and memory problems: dementia, poor school performance  · Mental health: depression, anxiety, insomnia  · Social problems: lost productivity, family problems, unemployment  · Alcohol dependence    For support and more information:  · Substance Abuse and [address]  Web Address: https://[employer]/    · Alcoholics Anonymous        Web Address: http://www.[employer].info/    https://www.cdc.gov/alcohol/fact-sheets/alcohol-use.htm     © [employer] 2018 Information is for End User's use only and may not be sold, redistributed or otherwise used for commercial purposes.  All illustrations and images included in CareNotes® are the copyrighted property of [employer]D.A[employer]M., Inc. or [address] [place] [address]

## 2023-11-16 ENCOUNTER — TELEMEDICINE (OUTPATIENT)
Dept: FAMILY MEDICINE CLINIC | Facility: CLINIC | Age: 83
End: 2023-11-16
Payer: COMMERCIAL

## 2023-11-16 VITALS — TEMPERATURE: 100.4 F | BODY MASS INDEX: 22.3 KG/M2 | WEIGHT: 119 LBS

## 2023-11-16 DIAGNOSIS — U07.1 COVID: Primary | ICD-10-CM

## 2023-11-16 PROCEDURE — 99213 OFFICE O/P EST LOW 20 MIN: CPT | Performed by: FAMILY MEDICINE

## 2023-11-16 RX ORDER — BENZONATATE 100 MG/1
100 CAPSULE ORAL 3 TIMES DAILY PRN
Qty: 20 CAPSULE | Refills: 0 | Status: SHIPPED | OUTPATIENT
Start: 2023-11-16

## 2023-11-16 NOTE — PROGRESS NOTES
COVID-19 Outpatient Progress Note    Assessment/Plan:    Patient only complaining about the cough and congestion and does not want Paxlovid  Will use Tessalon Perles for cough and Mucinex DM for congestion  We will call if she gets worse rather than better  Discussed quarantining and isolation and gave her timelines  Recheck as needed    Problem List Items Addressed This Visit    None  Visit Diagnoses       COVID    -  Primary    Relevant Medications    benzonatate (TESSALON PERLES) 100 mg capsule    dextromethorphan-guaifenesin (MUCINEX DM)  MG per 12 hr tablet           Disposition:     I have spent a total time of 15 minutes on the day of the encounter for this patient including instructions for management, risk factor reductions, impressions, documenting in the medical record and reviewing/ordering tests, medicine, procedures. Encounter provider: Isis Church DO     Provider located at: [address] [place] [address] [address]  [phone]     Recent Visits  No visits were found meeting these conditions. Showing recent visits within past 7 days and meeting all other requirements  Today's Visits  Date Type Provider Dept   11/16/23 Telemedicine Isis Church DO [address]   Showing today's visits and meeting all other requirements  Future Appointments  No visits were found meeting these conditions. Showing future appointments within next 150 days and meeting all other requirements     This virtual check-in was done via [address] [place] [address] and patient was informed that this is a secure, HIPAA-compliant platform. She agrees to proceed. Patient agrees to participate in a virtual check in via telephone or video visit instead of presenting to the office to address urgent/immediate medical needs. Patient is aware this is a billable service.  She acknowledged consent and understanding of privacy and security of the video platform. The patient has agreed to participate and understands they can discontinue the visit at any time. After connecting through [facility], the patient was identified by name and date of birth. Oscar Hall was informed that this was a telemedicine visit and that the exam was being conducted confidentially over secure lines. My office door was closed. No one else was in the room. Oscar Hall acknowledged consent and understanding of privacy and security of the telemedicine visit. I informed the patient that I have reviewed her record in Epic and presented the opportunity for her to ask any questions regarding the visit today. The patient agreed to participate. Verification of patient location:  Patient is located in the following state in which I hold an active license: [place]    Subjective: Oscar Hall is a 82 y.o. female who is concerned about COVID-19. Patient's symptoms include fever, fatigue, nasal congestion and cough. - Date of symptom onset: 11/14/2023      COVID-19 vaccination status: Fully vaccinated with booster    Lab Results   Component Value Date    SARSCOV2 Not Detected 08/17/2020       Review of Systems   Constitutional:  Positive for fatigue and fever. HENT:  Positive for congestion. Respiratory:  Positive for cough.       Current Outpatient Medications on File Prior to Visit   Medication Sig    Calcium Carbonate-Vit D-Min (CALCIUM 1200 PO) Take by mouth 2 (two) times a day    Cholecalciferol (VITAMIN D) 50 MCG (2000 UT) tablet Take 2,000 Units by mouth daily    estradiol (ESTRACE) 0.1 mg/g vaginal cream Insert 1 g into the vagina 2 (two) times a week    lisinopril (ZESTRIL) 40 mg tablet Take 1 tablet (40 mg total) by mouth daily    Multiple Vitamins-Minerals (MULTIVITAMIN WOMEN PO) Take 1 tablet by mouth daily     [DISCONTINUED] amoxicillin (AMOXIL) 500 mg capsule TAKE 4 CAPSULES BY MOUTH 1 HOUR PRIOR TO PROCEDURE (Patient not taking: Reported on 11/16/2023)       Objective:    Temp 100.4 °F (38 °C) (Oral)   Wt 54 kg (119 lb)   BMI 22.30 kg/m²        Physical Exam    Physical Exam   General: Patient is oriented to person, place, and time. The patient does not appear ill. No distress. Mental status: Awake, reasonable, focused, relevant  HEENT-normocephalic atraumatic without facial weakness, no facial pallor or flushing or cyanosis  Pulmonary/Chest: Effort normal. No coughing. No signs of respiratory distress, tachypnea, conversational dyspnea or audible wheezing noted. Psychiatric: The patient is oriented to person, place, and time. mood and affect.  Behavior is normal. Thought content normal.  Merline Trinidad,

## 2024-01-10 ENCOUNTER — HOSPITAL ENCOUNTER (OUTPATIENT)
Dept: ULTRASOUND IMAGING | Facility: HOSPITAL | Age: 84
Discharge: HOME | End: 2024-01-10
Payer: COMMERCIAL

## 2024-01-10 DIAGNOSIS — E04.1 THYROID NODULE: ICD-10-CM

## 2024-01-10 PROCEDURE — 76536 US EXAM OF HEAD AND NECK: CPT

## 2024-03-13 ENCOUNTER — HOSPITAL ENCOUNTER (OUTPATIENT)
Dept: MAMMOGRAPHY | Facility: IMAGING CENTER | Age: 84
Discharge: HOME | End: 2024-03-13
Payer: COMMERCIAL

## 2024-03-13 ENCOUNTER — OFFICE VISIT (OUTPATIENT)
Dept: DERMATOLOGY | Facility: CLINIC | Age: 84
End: 2024-03-13
Payer: COMMERCIAL

## 2024-03-13 VITALS — WEIGHT: 118.2 LBS | BODY MASS INDEX: 20.94 KG/M2 | HEIGHT: 63 IN | TEMPERATURE: 97.8 F

## 2024-03-13 VITALS — BODY MASS INDEX: 22.47 KG/M2 | HEIGHT: 61 IN | WEIGHT: 119 LBS

## 2024-03-13 DIAGNOSIS — Z12.31 ENCOUNTER FOR SCREENING MAMMOGRAM FOR MALIGNANT NEOPLASM OF BREAST: ICD-10-CM

## 2024-03-13 DIAGNOSIS — D48.5 NEOPLASM OF UNCERTAIN BEHAVIOR OF SKIN: ICD-10-CM

## 2024-03-13 DIAGNOSIS — L81.4 SOLAR LENTIGO: ICD-10-CM

## 2024-03-13 DIAGNOSIS — Z85.89 HISTORY OF SQUAMOUS CELL CARCINOMA: ICD-10-CM

## 2024-03-13 DIAGNOSIS — D22.62 MULTIPLE BENIGN MELANOCYTIC NEVI OF UPPER AND LOWER EXTREMITIES AND TRUNK: ICD-10-CM

## 2024-03-13 DIAGNOSIS — D22.72 MULTIPLE BENIGN MELANOCYTIC NEVI OF UPPER AND LOWER EXTREMITIES AND TRUNK: ICD-10-CM

## 2024-03-13 DIAGNOSIS — D22.5 MULTIPLE BENIGN MELANOCYTIC NEVI OF UPPER AND LOWER EXTREMITIES AND TRUNK: ICD-10-CM

## 2024-03-13 DIAGNOSIS — L57.8 OTHER SKIN CHANGES DUE TO CHRONIC EXPOSURE TO NONIONIZING RADIATION: ICD-10-CM

## 2024-03-13 DIAGNOSIS — Z12.83 SCREENING EXAM FOR SKIN CANCER: Primary | ICD-10-CM

## 2024-03-13 DIAGNOSIS — D18.01 CHERRY ANGIOMA: ICD-10-CM

## 2024-03-13 DIAGNOSIS — D22.71 MULTIPLE BENIGN MELANOCYTIC NEVI OF UPPER AND LOWER EXTREMITIES AND TRUNK: ICD-10-CM

## 2024-03-13 DIAGNOSIS — D22.61 MULTIPLE BENIGN MELANOCYTIC NEVI OF UPPER AND LOWER EXTREMITIES AND TRUNK: ICD-10-CM

## 2024-03-13 DIAGNOSIS — Z85.828 HISTORY OF BASAL CELL CARCINOMA: ICD-10-CM

## 2024-03-13 DIAGNOSIS — L82.1 SEBORRHEIC KERATOSIS: ICD-10-CM

## 2024-03-13 PROCEDURE — 77067 SCR MAMMO BI INCL CAD: CPT

## 2024-03-13 PROCEDURE — 88341 IMHCHEM/IMCYTCHM EA ADD ANTB: CPT | Performed by: STUDENT IN AN ORGANIZED HEALTH CARE EDUCATION/TRAINING PROGRAM

## 2024-03-13 PROCEDURE — 88305 TISSUE EXAM BY PATHOLOGIST: CPT | Performed by: STUDENT IN AN ORGANIZED HEALTH CARE EDUCATION/TRAINING PROGRAM

## 2024-03-13 PROCEDURE — 77063 BREAST TOMOSYNTHESIS BI: CPT

## 2024-03-13 PROCEDURE — 99214 OFFICE O/P EST MOD 30 MIN: CPT | Performed by: DERMATOLOGY

## 2024-03-13 PROCEDURE — 11103 TANGNTL BX SKIN EA SEP/ADDL: CPT | Performed by: DERMATOLOGY

## 2024-03-13 PROCEDURE — 88342 IMHCHEM/IMCYTCHM 1ST ANTB: CPT | Performed by: STUDENT IN AN ORGANIZED HEALTH CARE EDUCATION/TRAINING PROGRAM

## 2024-03-13 PROCEDURE — 11102 TANGNTL BX SKIN SINGLE LES: CPT | Performed by: DERMATOLOGY

## 2024-03-13 NOTE — PATIENT INSTRUCTIONS
"INFORMED CONSENT DISCUSSION AND POST-OPERATIVE INSTRUCTIONS FOR PATIENT    I.  RATIONALE FOR PROCEDURE  I understand that a skin biopsy allows the Dermatologist to test a lesion or rash under the microscope to obtain a diagnosis.  It usually involves numbing the area with numbing medication and removing a small piece of skin; sometimes the area will be closed with sutures. In this specific procedure, sutures are not usually needed.  If any sutures are placed, then they are usually need to be removed in 2 weeks or less.    I understand that my Dermatologist recommends that a skin \"shave\" biopsy be performed today.  A local anesthetic, similar to the kind that a dentist uses when filling a cavity, will be injected with a very small needle into the skin area to be sampled.  The injected skin and tissue underneath \"will go to sleep” and become numb so no pain should be felt afterwards.  An instrument shaped like a tiny \"razor blade\" (shave biopsy instrument) will be used to cut a small piece of tissue and skin from the area so that a sample of tissue can be taken and examined more closely under the microscope.  A slight amount of bleeding will occur, but it will be stopped with direct pressure and a pressure bandage and any other appropriate methods.  I understands that a scar will form where the wound was created.  Surgical ointment will be applied to help protect the wound.  Sutures are not usually needed.    II.  RISKS AND POTENTIAL COMPLICATIONS   I understand the risks and potential complications of a skin biopsy include but are not limited to the following:  Bleeding  Infection  Pain  Scar/keloid  Skin discoloration  Incomplete Removal  Recurrence  Nerve Damage/Numbness/Loss of Function  Allergic Reaction to Anesthesia  Biopsies are diagnostic procedures and based on findings additional treatment or evaluation may be required  Loss or destruction of specimen resulting in no additional findings    My Dermatologist " "has explained to me the nature of the condition, the nature of the procedure, and the benefits to be reasonably expected compared with alternative approaches.  My Dermatologist has discussed the likelihood of major risks or complications of this procedure including the specific risks listed above, such as bleeding, infection, and scarring/keloid.  I understand that a scar is expected after this procedure.  I understand that my physician cannot predict if the scar will form a \"keloid,\" which extends beyond the borders of the wound that is created.  A keloid is a thick, painful, and bumpy scar.  A keloid can be difficult to treat, as it does not always respond well to therapy, which includes injecting cortisone directly into the keloid every few weeks.  While this usually reduces the pain and size of the scar, it does not eliminate it.      I understand that photographs may be taken before and after the procedure.  These will be maintained as part of the medical providers confidential records and may not be made available to me.  I further authorize the medical provider to use the photographs for teaching purposes or to illustrate scientific papers, books, or lectures if in his/her judgment, medical research, education, or science may benefit from its use.    I have had an opportunity to fully inquire about the risks and benefits of this procedure and its alternatives.   I have been given ample time and opportunity to ask questions and to seek a second opinion if I wished to do so.  I acknowledge that there have specifically been no guarantees as to the cosmetic results from the procedure.  I am aware that with any procedure there is always the possibility of an unexpected complication.    III. POST-PROCEDURAL CARE (WHAT YOU WILL NEED TO DO \"AFTER THE BIOPSY\" TO OPTIMIZE HEALING)    Keep the area clean and dry.  Try NOT to remove the bandage or get it wet for the first 24 hours.    Gently clean the area and apply " "surgical ointment (such as Vaseline petrolatum ointment, which is available \"over the counter\" and not a prescription) to the biopsy site for up to 2 weeks straight.  This acts to protect the wound from the outside world.      Sutures are not usually placed in this procedure.  If any sutures were placed, return for suture removal as instructed (generally 1 week for the face, 2 weeks for the body).      Take Acetaminophen (Tylenol) for discomfort, if no contraindications.  Ibuprofen or aspirin could make bleeding worse.    Call our office immediately for signs of infection: fever, chills, increased redness, warmth, tenderness, discomfort/pain, or pus or foul smell coming from the wound.    WHAT TO DO IF THERE IS ANY BLEEDING?  If a small amount of bleeding is noticed, place a clean cloth over the area and apply firm pressure for ten minutes.  Check the wound after 10 minutes of direct pressure.  If bleeding persists, try one more time for an additional 10 minutes of direct pressure on the area.  If the bleeding becomes heavier or does not stop after the second attempt, or if you have any other questions about this procedure, then please call your [facility]'s Dermatologist by calling [phone] (SKIN).     I hereby acknowledge that I have reviewed and verified the site with my Dermatologist and have requested and authorized my Dermatologist to proceed with the procedure.    "

## 2024-03-13 NOTE — PROGRESS NOTES
"[facility] Note     Patient Name: Sofia Gurrola  Encounter Date: 3/13/2024     Have you been cared for by a [facility] Dermatologist in the last 3 years and, if so, which description applies to you?    Yes.  I have been here within the last 3 years, and my medical history has NOT changed since that time.  I am FEMALE/of child-bearing potential.    REVIEW OF SYSTEMS:  Have you recently had or currently have any of the following? No changes in my recent health.   PAST MEDICAL HISTORY:  Have you personally ever had or currently have any of the following?  If \"YES,\" then please provide more detail. No changes in my medical history.   HISTORY OF IMMUNOSUPPRESSION: Do you have a history of any of the following:  Systemic Immunosuppression such as Diabetes, Biologic or Immunotherapy, Chemotherapy, Organ Transplantation, Bone Marrow Transplantation?  No     Answering \"YES\" requires the addition of the dotphrase \"IMMUNOSUPPRESSED\" as the first diagnosis of the patient's visit.   FAMILY HISTORY:  Any \"first degree relatives\" (parent, brother, sister, or child) with the following?    No changes in my family's known health.   PATIENT EXPERIENCE:    Do you want the Dermatologist to perform a COMPLETE skin exam today including a clinical examination under the \"bra and underwear\" areas?  Yes  If necessary, do we have your permission to call and leave a detailed message on your Preferred Phone number that includes your specific medical information?  Yes      No Known Allergies   Current Outpatient Medications:     benzonatate (TESSALON PERLES) 100 mg capsule, Take 1 capsule (100 mg total) by mouth 3 (three) times a day as needed for cough, Disp: 20 capsule, Rfl: 0    Calcium Carbonate-Vit D-Min (CALCIUM 1200 PO), Take by mouth 2 (two) times a day, Disp: , Rfl:     Cholecalciferol (VITAMIN D) 50 MCG (2000 UT) tablet, Take 2,000 Units by mouth daily, Disp: , Rfl:     dextromethorphan-guaifenesin (MUCINEX DM) "  MG per 12 hr tablet, Take 1 twice a day with 8 oz of water, Disp: , Rfl: 0    estradiol (ESTRACE) 0.1 mg/g vaginal cream, Insert 1 g into the vagina 2 (two) times a week, Disp: 42.5 g, Rfl: 4    lisinopril (ZESTRIL) 40 mg tablet, Take 1 tablet (40 mg total) by mouth daily, Disp: 90 tablet, Rfl: 3    Multiple Vitamins-Minerals (MULTIVITAMIN WOMEN PO), Take 1 tablet by mouth daily , Disp: , Rfl:           Whom besides the patient is providing clinical information about today's encounter?   NO ADDITIONAL HISTORIAN (patient alone provided history)    Physical Exam and Assessment/Plan by Diagnosis:      HISTORY OF BASAL CELL CARCINOMA    Physical Exam:  Anatomic Location Affected:  Left forearm   Morphological Description of scar:  Well healed surgical scar  Suspected Recurrence: No      Additional History of Present Condition:  History of basal cell carcinoma with no sign of recurrence. Left forearm treated with curettage and desiccation procedure. Previous Accession number: [id number].     Assessment and Plan:  Based on a thorough discussion of this condition and the management approach to it (including a comprehensive discussion of the known risks, side effects and potential benefits of treatment), the patient (family) agrees to implement the following specific plan:  Monitor for recurrence.  When outside we recommend using a wide brim hat, sunglasses, long sleeve and pants, sunscreen with SPF 30+ with reapplication every 2 hours, or SPF specific clothing       How can basal cell carcinoma be prevented?  The most important way to prevent BCC is to avoid sunburn. This is especially important in childhood and early life. Fair skinned individuals and those with a personal or family history of BCC should protect their skin from sun exposure daily, year-round and lifelong.  Stay indoors or under the shade in the middle of the day   Wear covering clothing   Apply high protection factor SPF50+ broad-spectrum sunscreens  "generously to exposed skin if outdoors   Avoid indoor tanning (sun beds, solaria)  Oral nicotinamide (vitamin B3) in a dose of 500 mg twice daily may reduce the number and severity of BCCs.    What is the outlook for basal cell carcinoma?  Most BCCs are cured by treatment. Cure is most likely if treatment is undertaken when the lesion is small.  About 50% of people with BCC develop a second one within 3 years of the first. They are also at increased risk of other skin cancers, especially melanoma. Regular self-skin examinations and long-term annual skin checks by an experienced health professional are recommended.       HISTORY OF SQUAMOUS CELL CARCINOMA     Physical Exam:  Anatomic Location Affected:  Dorsal left hand  Morphological Description of Scar:  Well healed surgical scar  Suspected Recurrence: no  Regional adenopathy: no    Assessment and Plan:  Based on a thorough discussion of this condition and the management approach to it (including a comprehensive discussion of the known risks, side effects and potential benefits of treatment), the patient (family) agrees to implement the following specific plan:  Monitor for recurrence  When outside we recommend using a wide brim hat, sunglasses, long sleeve and pants, sunscreen with SPF 30+ with reapplication every 2 hours, or SPF specific clothing       NEOPLASM OF UNCERTAIN BEHAVIOR OF SKIN    Physical Exam:  (Anatomic Location); (Size and Morphological Description); (Differential Diagnosis):  Specimen A: Right lateral cheek; 0.9 cm verrucous papule; Ddx: wart versus inflamed seborrheic keratosis  Specimen B: left forehead; Skin; Shave; 83 year old female with 0.7 cm ill defined brown macule with fragmented pigment. Ddx. Lentigo versus MM      Assessment and Plan:  I have discussed with the patient that a sample of skin via a \"skin biopsy” would be potentially helpful to further make a specific diagnosis under the microscope.  Based on a thorough discussion of " this condition and the management approach to it (including a comprehensive discussion of the known risks, side effects and potential benefits of treatment), the patient (family) agrees to implement the following specific plan:    Procedure:  Skin Biopsy.  After a thorough discussion of treatment options and risk/benefits/alternatives (including but not limited to local pain, scarring, dyspigmentation, blistering, possible superinfection, and inability to confirm a diagnosis via histopathology), verbal and written consent were obtained and portion of the rash was biopsied for tissue sample.  See below for consent that was obtained from patient and subsequent Procedure Note.    PROCEDURE TANGENTIAL (SHAVE) BIOPSY NOTE:    Performing Physician:   Anatomic Location; Clinical Description with size (cm); Pre-Op Diagnosis:   Specimen A: Right lateral cheek; 0.9 cm verrucous papule;  wart versus inflamed seborrheic keratosis    Post-op diagnosis: Same   Local anesthesia: 1% xylocaine with epi    Topical anesthesia: None  Hemostasis: Electrocautery       Anatomic Location; Clinical Description with size (cm); Pre-Op Diagnosis:   Specimen B: left forehead; Skin; 0.7 cm ill defined brown macule with fragmented pigment. Ddx. Lentigo versus MM  Post-op diagnosis: Same   Local anesthesia: 1% xylocaine with epi    Topical anesthesia: None  Hemostasis: Electrocautery     After obtaining informed consent  at which time there was a discussion about the purpose of biopsy  and low risks of infection and bleeding.  The area was prepped and draped in the usual fashion. Anesthesia was obtained with 1% lidocaine with epinephrine. A shave biopsy to an appropriate sampling depth was obtained by Shave (Dermablade or 15 blade) The resulting wound was covered with surgical ointment and bandaged appropriately.     The patient tolerated the procedure well without complications and was without signs of functional compromise.      Specimen  "has been sent for review by Dermatopathology.    Standard post-procedure care has been explained and has been included in written form within the patient's copy of Informed Consent.    INFORMED CONSENT DISCUSSION AND POST-OPERATIVE INSTRUCTIONS FOR PATIENT    I.  RATIONALE FOR PROCEDURE  I understand that a skin biopsy allows the Dermatologist to test a lesion or rash under the microscope to obtain a diagnosis.  It usually involves numbing the area with numbing medication and removing a small piece of skin; sometimes the area will be closed with sutures. In this specific procedure, sutures are not usually needed.  If any sutures are placed, then they are usually need to be removed in 2 weeks or less.    I understand that my Dermatologist recommends that a skin \"shave\" biopsy be performed today.  A local anesthetic, similar to the kind that a dentist uses when filling a cavity, will be injected with a very small needle into the skin area to be sampled.  The injected skin and tissue underneath \"will go to sleep” and become numb so no pain should be felt afterwards.  An instrument shaped like a tiny \"razor blade\" (shave biopsy instrument) will be used to cut a small piece of tissue and skin from the area so that a sample of tissue can be taken and examined more closely under the microscope.  A slight amount of bleeding will occur, but it will be stopped with direct pressure and a pressure bandage and any other appropriate methods.  I understands that a scar will form where the wound was created.  Surgical ointment will be applied to help protect the wound.  Sutures are not usually needed.    II.  RISKS AND POTENTIAL COMPLICATIONS   I understand the risks and potential complications of a skin biopsy include but are not limited to the following:  Bleeding  Infection  Pain  Scar/keloid  Skin discoloration  Incomplete Removal  Recurrence  Nerve Damage/Numbness/Loss of Function  Allergic Reaction to Anesthesia  Biopsies are " "diagnostic procedures and based on findings additional treatment or evaluation may be required  Loss or destruction of specimen resulting in no additional findings    My Dermatologist has explained to me the nature of the condition, the nature of the procedure, and the benefits to be reasonably expected compared with alternative approaches.  My Dermatologist has discussed the likelihood of major risks or complications of this procedure including the specific risks listed above, such as bleeding, infection, and scarring/keloid.  I understand that a scar is expected after this procedure.  I understand that my physician cannot predict if the scar will form a \"keloid,\" which extends beyond the borders of the wound that is created.  A keloid is a thick, painful, and bumpy scar.  A keloid can be difficult to treat, as it does not always respond well to therapy, which includes injecting cortisone directly into the keloid every few weeks.  While this usually reduces the pain and size of the scar, it does not eliminate it.      I understand that photographs may be taken before and after the procedure.  These will be maintained as part of the medical providers confidential records and may not be made available to me.  I further authorize the medical provider to use the photographs for teaching purposes or to illustrate scientific papers, books, or lectures if in his/her judgment, medical research, education, or science may benefit from its use.    I have had an opportunity to fully inquire about the risks and benefits of this procedure and its alternatives.   I have been given ample time and opportunity to ask questions and to seek a second opinion if I wished to do so.  I acknowledge that there have specifically been no guarantees as to the cosmetic results from the procedure.  I am aware that with any procedure there is always the possibility of an unexpected complication.    III. POST-PROCEDURAL CARE (WHAT YOU WILL NEED TO " "DO \"AFTER THE BIOPSY\" TO OPTIMIZE HEALING)    Keep the area clean and dry.  Try NOT to remove the bandage or get it wet for the first 24 hours.    Gently clean the area and apply surgical ointment (such as Vaseline petrolatum ointment, which is available \"over the counter\" and not a prescription) to the biopsy site for up to 2 weeks straight.  This acts to protect the wound from the outside world.      Sutures are not usually placed in this procedure.  If any sutures were placed, return for suture removal as instructed (generally 1 week for the face, 2 weeks for the body).      Take Acetaminophen (Tylenol) for discomfort, if no contraindications.  Ibuprofen or aspirin could make bleeding worse.    Call our office immediately for signs of infection: fever, chills, increased redness, warmth, tenderness, discomfort/pain, or pus or foul smell coming from the wound.    WHAT TO DO IF THERE IS ANY BLEEDING?  If a small amount of bleeding is noticed, place a clean cloth over the area and apply firm pressure for ten minutes.  Check the wound after 10 minutes of direct pressure.  If bleeding persists, try one more time for an additional 10 minutes of direct pressure on the area.  If the bleeding becomes heavier or does not stop after the second attempt, or if you have any other questions about this procedure, then please call your [facility]'s Dermatologist by calling [phone] (SKIN).     I hereby acknowledge that I have reviewed and verified the site with my Dermatologist and have requested and authorized my Dermatologist to proceed with the procedure.      CHERRY ANGIOMAS     Physical Exam:  Anatomic Location Affected:  Trunk and extremities  Morphological Description:  Scattered cherry red papules  Denies pain, itch, bleeding. No treatments tried. Present for years. Present constantly; no modifying factors which make it worse or better.     Assessment and Plan:  Based on a thorough discussion of this condition and the " "management approach to it (including a comprehensive discussion of the known risks, side effects and potential benefits of treatment), the patient (family) agrees to implement the following specific plan:  Reassure benign        SEBORRHEIC KERATOSIS; NON-INFLAMED     Physical Exam:  Anatomic Location Affected:  Trunk and extremities  Morphological Description:  Waxy, smooth to warty textured, yellow to brownish-grey to dark brown to blackish, discrete, \"stuck-on\" appearing papules.  Present for years. Denies pain, itch, bleeding.      Additional History of Present Condition:  Present constantly; no modifying factors which make it worse or better. No prior treatment.       Assessment and Plan:  Based on a thorough discussion of this condition and the management approach to it (including a comprehensive discussion of the known risks, side effects and potential benefits of treatment), the patient (family) agrees to implement the following specific plan:  Reassure benign  Use sun protection.  Apply SPF 30 or higher at least three times a day.  Wear sun protecting clothing and hats.        SOLAR LENTIGINES   OTHER SKIN CHANGES DUE TO CHRONIC EXPOSURE TO NONIONIZING RADIATION     Physical Exam:  Anatomic Location Affected:  Sun exposed areas of back, chest, arms, legs  Morphological Description:  Multiple scattered brown to tan evenly pigmented macules   Denies pain, itch, bleeding. No treatments tried. Present for months - years. Reports getting newer lesions with sun exposure.         Assessment and Plan:  Based on a thorough discussion of this condition and the management approach to it (including a comprehensive discussion of the known risks, side effects and potential benefits of treatment), the patient (family) agrees to implement the following specific plan:  Reassure benign  Use sun protection.  Apply SPF 30 or higher at least three times a day.  Wear sun protecting clothing and hats.         MULTIPLE MELANOCYTIC " "NEVI (\"Moles\")     Physical Exam:  Anatomic Location Affected: Trunk and extremities  Morphological Description:  Scattered, round to ovoid, symmetrical-appearing, even bordered, skin colored to dark brown macules/papules  Denies pain, itch, bleeding. No treatments tried. Present for years. Present constantly; no modifying factors which make it worse or better. Denies actively changing or growing moles.      Assessment and Plan:  Based on a thorough discussion of this condition and the management approach to it (including a comprehensive discussion of the known risks, side effects and potential benefits of treatment), the patient (family) agrees to implement the following specific plan:  Reassure benign  Monitor for changes  Use sun protection.  Apply SPF 30 or higher at least three times a day.  Wear sun protecting clothing and hats.      Scribe Attestation      I,:  Florence Andrew am acting as a scribe while in the presence of the attending physician.:       I,:  Placido Grey MD personally performed the services described in this documentation    as scribed in my presence.:             " Statement Selected

## 2024-03-18 PROCEDURE — 88342 IMHCHEM/IMCYTCHM 1ST ANTB: CPT | Performed by: STUDENT IN AN ORGANIZED HEALTH CARE EDUCATION/TRAINING PROGRAM

## 2024-03-18 PROCEDURE — 88341 IMHCHEM/IMCYTCHM EA ADD ANTB: CPT | Performed by: STUDENT IN AN ORGANIZED HEALTH CARE EDUCATION/TRAINING PROGRAM

## 2024-03-18 PROCEDURE — 88305 TISSUE EXAM BY PATHOLOGIST: CPT | Performed by: STUDENT IN AN ORGANIZED HEALTH CARE EDUCATION/TRAINING PROGRAM

## 2024-03-19 NOTE — RESULT ENCOUNTER NOTE
DERMATOPATHOLOGY RESULT NOTE    Results reviewed by ordering physician.  Per Dr. Grey, called patient to discuss results which demonstrated a seborrheic keratosis of the right lateral cheek which is benign and pigmented basal cell carcinoma of the left forehead which is malignant requiring Moh's surgery. Further, skin exams every 6 months is recommended. No answer. Left message requesting a call back. Call back number provided.         Instructions for Clinical Derm Team:   (remember to route Result Note to appropriate staff):    Referral to Moh's scheduling and scheduling of 6 month skin exams by staff pending discussion with patient.     Result & Plan by Specimen:    Specimen A: benign  Plan: reassured benign      Specimen B: malignant; pigmented basal cell carcinoma   Plan: MOHs pending discussion with patient and 6 month skin exams.        [id number]  Order: [id number]  Status: Final result      Visible to patient: Yes (seen)      Dx: Neoplasm of uncertain behavior of skin    1 Result Note     Component   Case Report  Surgical Pathology Report                         Case: [id number]                                  Authorizing Provider:  Placido Grey MD           Collected:           03/13/2024 [facility]              Ordering Location:     [facility]      Received:            03/13/2024 [address] [place] [address]                                                                Pathologist:           Anyi Francis MD                                                          Specimens:   A) - Lesion, Specimen A: right lateral cheek                                                       B) - Lesion, Specimen B: left forehead                                                  Final Diagnosis  A. Skin, right lateral cheek, shave biopsy:    SEBORRHEIC KERATOSIS         B. Skin, left forehead, shave biopsy:    Superficial portion of pigmented BASAL CELL CARCINOMA (at least SUPERFICIAL  "TYPE); transected.   Electronically signed by Anyi Francis MD on 3/18/2024 at  4:57 PM  Additional Information   All reported additional testing was performed with appropriately reactive controls.  These tests were developed and their performance characteristics determined by [facility] or appropriate performing facility, though some tests may be performed on tissues which have not been validated for performance characteristics (such as staining performed on alcohol exposed cell blocks and decalcified tissues).  Results should be interpreted with caution and in the context of the patients' clinical condition. These tests may not be cleared or approved by the U.S. Food and Drug Administration, though the FDA has determined that such clearance or approval is not necessary. These tests are used for clinical purposes and they should not be regarded as investigational or for research. This laboratory has been approved by [place] 88, designated as a high-complexity laboratory and is qualified to perform these tests.  .  Gross Description   A. The specimen is received in formalin, labeled with the patient's name and hospital number, and is designated \" lesion right lateral cheek\".  It consists of a 1.5 x 0.6 x 0.2 cm aggregate of tan-pink keratotic tissue fragments.  Entirely submitted in an embedding bag in cassette A1.  B. The specimen is received in formalin, labeled with the patient's name and hospital number, and is designated \" lesion left forehead\".  It consists of two 0.3 cm tan-pink and translucent soft tissue fragments.  Entirely submitted in an embedding bag in cassette B1.    Note: The estimated total formalin fixation time based upon information provided by the submitting clinician and the standard processing schedule is under 72 hours.  [facility]  Clinical Information   ATTN DERMPATH    Skin; shave biopsies; 83 year old female  Specimen A: Right lateral cheek; Skin; Shave; 0.9 cm " verrucous papule;  wart versus inflamed seborrheic keratosis  Specimen B: left forehead; Skin; Shave; 0.7 cm ill defined brown macule with fragmented pigment. Ddx. Lentigo versus MM  Resulting Agency [id number]          Specimen Collected: 03/13/24  2:51 PM Last Resulted: 03/18/24  4:57 PM

## 2024-03-20 DIAGNOSIS — C44.310 BASAL CELL CARCINOMA (BCC) OF SKIN OF FACE, UNSPECIFIED PART OF FACE: Primary | ICD-10-CM

## 2024-03-20 NOTE — RESULT ENCOUNTER NOTE
Called patient to discuss results and treatment plan. She expressed understanding. Referral from Mohs surgery placed and staff message sent to reach out to patient to schedule 6 month skin exams.

## 2024-03-21 DIAGNOSIS — Z79.2 PROPHYLACTIC ANTIBIOTIC: Primary | ICD-10-CM

## 2024-03-21 NOTE — TELEPHONE ENCOUNTER
Pre- operative Mohs Telephone Scheduling Note    Do you have a pacemaker, defibrillator, spinal or brain stimulator? no    Do you take antibiotics before skin or dental procedures? yes: amoxicillin  If yes, will likely require pre-operative antibiotics. Ask  the patient why they take the antibiotics (usually because of joint replacement).    Do you have a history of a joint replacements within the past 2 years? yes: sept 2022/ right shoulder replacement    If yes, will likely require pre-operative antibiotics. Ask if orthopaedic surgeon has prescribed pre-operative antibiotics to take before procedures/dental work?    Do you take any OTC medications that thin your blood (Aspirin, Aleve, Ibuprofen) or supplements that thin your blood (fish oil, garlic, vitamin E, Ginko Biloba)? no    Do you take any prescribed medications that thin your blood (Coumadin, Plavix, Xarelto, Eliquis or another prescribed blood thinner)? no    Do you have an allergy to lidocaine or epinephrine? no    Do you have allergies to Iodine? no    Do you wear a lidocaine patch? no    Have you ever been diagnosed with HIV, AIDS, Hep B and Hep C? no    Do you use a cane, walker or wheelchair? no    Is the patient from a nursing home? no If yes, Is there any special accommodations that is needed for patient n/a    Do you smoke? no      If yes,  patient to try and stop 2 days before surgery and 7 days after the surgery. Minimizing smoking as much as possible during this time will improve healing and the cosmetic result after surgery.    Do you use supplemental oxygen? If so, how many liters and can you be off it for a short period of time? N/a    Date scheduled: June 5, 2024 at 10 am with Dr. Gurrola     Coordination of Care with other provider (Oculoplastics, Plastics, ENT) required? no   IF YES, PLEASE FORWARD TO APPROPRIATE PERSONNEL TO HELP COORDINATE.    Are there remaining tumors to be scheduled? no    Was Prior Authorization obtained? No  (please use [email or web address] to document prior auth)

## 2024-03-21 NOTE — LETTER
Sofia Gurrola     1940    [address] [id number]    Dear Sofia Gurrola,    You are scheduled to have the MOHS procedure on June 5, 2024 at 10 am for forehead with Dr.Ryan Gurrola. Our office is located in [facility] our address is [address] [place] [address]. Once you arrive please check in with our front staff in [address] and they will escort you to the MOHS waiting room.  If you have someone bringing you to your appointment they may wait in the waiting room or accompany you in your visit.      Below you will find some pre-op instructions along with some information regarding the MOHS procedure.     If you have any questions please call our office at [phone].       Thank you,    [facility]         PRE-OPERATIVE INSTRUCTIONS - MOHS    Before your scheduled surgery, there are a number of important precautions and positive steps you should take to help prepare yourself for a successful treatment and speedy recovery.    Some of the steps, which are listed below, may seem unnecessary and inconvenient, but they are important. For example, when you stop smoking, you increase your ability to heal. Occasionally, there may be valid reasons, personal or medical, why you can't comply. In such cases, please call the office so we can discuss possible ways to overcome any obstacles you may be encountering.    If you have any questions about the surgery, or remember additional medical information that you forgot to mention to our staff, please contact the office prior to your surgery.    GENERAL INFORMATION REGARDING MOHS MICROGRAPHIC SURGERY    Mohs surgery is a specialized technique for the removal of skin cancer developed by Dr. Frederick Mohs over 50 years ago to improve the cure rates of skin cancer. Traditionally, skin cancers are treated by destructive methods (radiation, freezing, scraping, and burning)  or excision (cutting out the tissue with standards margins and sending it to an outside laboratory for testing). These methods all yield cure rates between 65%-94%. However, for cancers located in cosmetically sensitive areas, large tumors, or tumors unsuccessfully treated by other means, Mohs surgery offers a higher cure rate. In most cases, Mohs surgery provides you with a 99% cure rate for primary (previously untreated) basal cell cancer and a 95% cure rate for primary squamous cell cancer. In Mohs surgery, tissue is removed and processed in a way that we are able to check 100% of the margins, giving the highest cure rate for any method of treating skin cancers while providing maximal preservation of normal skin. This allows the surgeon to produce an optimal cosmetic result for the patient by maximizing the amount of tissue removed yielding as small a scar as possible    On the day of surgery, you will be given local anesthesia only (similar to what was given to you during your initial biopsy). You will remain awake. You will verify the location of the skin cancer prior to the onset of the surgery. Once the area is numb, the tissue containing the skin cancer will be removed, taking a small safety margin. This margin is usually smaller than what would be taken with a standard excision. Once the tissue is removed, it is marked and oriented. The first layer (“Stage I”) will be processed in our laboratory. The wound will be treated for bleeding and a bandage will be placed to keep you comfortable while you wait an approximate 45 minutes-1 hour (for the processing of the tissue) in your room. Your Mohs surgeon will examine the pathology in the lab, checking all the margins. If any tumor remains, you will need to take a second layer of skin (“Stage 2”). The area will be re-anesthetized and your Mohs surgeon will remove more skin only in the area where the tumor exists. This process will continue until all the skin  cancer is removed. Unfortunately, there is no method to predict how many layers or stages will be taken.    Once the tumor has been removed completely, we will discuss the best ways to close the defect. Most wounds may be closed with stitches. A larger wound may require a skin graft or a flap. In rare instances, especially for cancers around the eye or for larger cancers, we may work with another surgeon (oculoplastic, ENT, plastics) with special skills to assist with reconstruction.    Medications: Please take all your normal medications the morning of your surgery. If you are a diabetic, please bring your insulin or medications with you, as well as a snack to avoid having low blood sugar during your day with us.     Blood Thinners    VERY IMPORTANT: We do NOT stop or hold prescribed blood thinners (such as Coumadin/Warfarin, Plavix, Eliquis, Pradaxa, Brilinta, Apixaban, Xarelto, Lovonox, Rivaroxaban, or Aggrenox) before Mohs surgery. Additionally, If you take aspirin because you have had a stroke, heart attack, heart disease, other condition, or your physician has prescribed you to take it, please continue your aspirin.    Although there is a risk of increased bruising and bleeding, we are still able to safely perform surgery while continuing these medications. Please NEVER stop your prescribed blood thinner without the managing doctor's (the doctor that prescribed the medication) permission or knowledge. If you have any concerns about not holding your blood thinner, please address these with your Mohs surgeon.    Most people should stop all non-steroidal anti-inflammatory medications (Motrin, Naproxen, Advil, Midol, Aleve, etc.) for 7 days prior to your scheduled surgery and 2 days after (unless instructed otherwise after surgery).  You may take Tylenol for pain.     Antibiotics  If you usually require antibiotics prior to dental work, please let the office know at least 24 hours prior to your surgery. Medical  conditions that sometimes require preoperative antibiotics include artificial heart valves, heart murmurs, artificial joints, and related problems. We may give you a different medication than the dentist, so please contact us for the correct antibiotic.  If you were prescribed pre-operative antibiotics by our office, please take the medication 2 hours prior to your procedure.    Vitamins and Supplements  Avoid taking any supplements with Vitamin E, Fish Oil, Gingko, Ginseng, and Garlic for 2 weeks before and 2 days after your surgery. These thin your blood.    Alcohol: Avoid drinking alcohol for 2 days prior to your surgery, and for 2 days afterwards (it thins the blood and causes more bruising and swelling).    Smoking: Try to STOP or reduce smoking significantly the week before your surgery, and especially the week afterwards (it greatly improves how well you heal). Tobacco smoke deprives the blood of oxygen, which is urgently needed by the wound during the healing process.    Contact Lenses: Do not wear them on the day of the surgery. Instead, wear glasses and bring your case, in case we need to remove them.    Clothing: Do not wear your nicest clothing on your surgery day. We recommend wearing a button down shirt that will not disrupt your post-operative dressing when changing later that night.    Bathing: On the morning of your surgery, you may bathe or shower normally. If you get your hair done on a weekly basis, remember to get your hair washed the day before surgery.   - You will need to keep your surgical site dry for a minimum of 48 hours after surgery.    Makeup: If your surgery is on the face, please do not wear any makeup on the day of the surgery.    Jewelry: Please try to avoid wearing jewelry on the day of surgery.    Food: On the morning of surgery, have breakfast but limit your intake of caffeinated beverages. They are diuretic and may inconvenience you during surgery. If you are following up with  another surgeon the same day as your Mohs surgery, you must receive permission to eat breakfast from that surgeon.      What to bring with you on the day of your surgery:  Bring snacks - Since you could be at the office long, you may bring snacks and/or lunch with you. Some snacks and drinks are available at the office as well.   Bring a sweater - Bring a sweater or jacket that buttons or zips down the front and will not disturb your wound dressing during removal.  Bring something to do - You will be spending much of the day in our office. There will be 45-60 minute waiting periods  between layers/stages, and while there is a television with cable in every room, it is nice to have something to keep you occupied such as books, magazines, knitting, music, or work.     Planning Ahead:  Other Appointments - It is important to realize that no matter how small the skin cancer appears to be, looks can be deceiving. Since your surgery may last the entire day, you should not schedule any other appointments that day.  Special Occasions - Surgery often creates swelling and bruising. Also, the post-op dressing may be rather large and obvious. Keep this in mind as you arrange your social/work schedule. If an important event is already planned, please check with your referring physician or your Mohs surgeon to see if the surgery can be postponed.  Activity Limits after Surgery - If surgery was performed on your face, we recommend that you keep your activity level to a minimum for 2-3 days (the blood pressure elevation related to exercise can lead to bleeding). If you have stitches in an area that will be under tension or significant movement (neck, back, arms, legs), you will need to avoid heavy lifting (anything over 5 lbs) or exercise for at least 2 weeks and possibly longer. We also advise that you limit out of town travel for the first 7 days after surgery. You should also wait at least 7 days before going into a pool or the  ocean.  Housework - Since you will need to minimize activity after surgery, plan to do your groceries, laundry, gardening, and other heavy household chores prior to your surgery. Please make arrangements for assistance during the post-op period. If surgery is around your mouth area, you may need to eat soft foods, such as soup, milkshakes, or yogurt for 48 hours.    Purchasing bandage supplies: Prior to surgery, please purchase the following items to care for your surgical wound properly.  Cotton swabs (Q-tips)  Vaseline or Aquaphor  Telfa pads (or any non-stick dressing)  Paper tape or Hypafix tape  Gauze pads (3x3)      We will provide you with some bandage supplies after surgery to get you started.    Transportation: It is often reassuring and comforting to have a  drive you to and from the surgery. He or she is welcome to wait in the office during the surgery. Please note that for safety reasons, only the patient is allowed in the procedure room during surgery. Thank you for your cooperation.    Rescheduling: If you need to reschedule your surgery, please notify the office as soon as possible.

## 2024-03-22 RX ORDER — AMOXICILLIN 500 MG/1
CAPSULE ORAL
Qty: 4 CAPSULE | Refills: 0 | Status: CANCELLED | OUTPATIENT
Start: 2024-03-22 | End: 2024-06-05

## 2024-03-25 NOTE — TELEPHONE ENCOUNTER
Spoke to patient- she will take 4 500mg amoxicillin 1 hour before mohs- does not need any- she has some at home.

## 2024-03-27 DIAGNOSIS — I10 ESSENTIAL HYPERTENSION: ICD-10-CM

## 2024-03-27 DIAGNOSIS — N95.2 VAGINAL ATROPHY: ICD-10-CM

## 2024-03-27 RX ORDER — ESTRADIOL 0.1 MG/G
CREAM VAGINAL
Qty: 42.5 G | Refills: 1 | Status: SHIPPED | OUTPATIENT
Start: 2024-03-27

## 2024-03-27 RX ORDER — LISINOPRIL 40 MG/1
40 TABLET ORAL DAILY
Qty: 90 TABLET | Refills: 3 | Status: SHIPPED | OUTPATIENT
Start: 2024-03-27

## 2024-03-27 NOTE — TELEPHONE ENCOUNTER
Reason for call:   [x] Refill   [] Prior Auth  [] Other:     Office:   [] PCP/Provider -   [x] Specialty/Provider - OBGYN    Medication:   Estrace cream- Insert 1 g into the vagina 2 times a week      Pharmacy: [facility]    Does the patient have enough for 3 days?   [x] Yes   [] No - Send as HP to POD

## 2024-04-19 ENCOUNTER — APPOINTMENT (OUTPATIENT)
Dept: LAB | Facility: CLINIC | Age: 84
End: 2024-04-19
Payer: COMMERCIAL

## 2024-04-19 DIAGNOSIS — I10 ESSENTIAL HYPERTENSION: ICD-10-CM

## 2024-04-19 DIAGNOSIS — E87.1 HYPONATREMIA: ICD-10-CM

## 2024-04-19 LAB
ALBUMIN SERPL BCP-MCNC: 4.2 G/DL (ref 3.5–5)
ALP SERPL-CCNC: 62 U/L (ref 34–104)
ALT SERPL W P-5'-P-CCNC: 20 U/L (ref 7–52)
ANION GAP SERPL CALCULATED.3IONS-SCNC: 7 MMOL/L (ref 4–13)
AST SERPL W P-5'-P-CCNC: 27 U/L (ref 13–39)
BASOPHILS # BLD AUTO: 0.03 THOUSANDS/ÂΜL (ref 0–0.1)
BASOPHILS NFR BLD AUTO: 1 % (ref 0–1)
BILIRUB SERPL-MCNC: 0.72 MG/DL (ref 0.2–1)
BUN SERPL-MCNC: 24 MG/DL (ref 5–25)
CALCIUM SERPL-MCNC: 9.9 MG/DL (ref 8.4–10.2)
CHLORIDE SERPL-SCNC: 97 MMOL/L (ref 96–108)
CHOLEST SERPL-MCNC: 180 MG/DL
CO2 SERPL-SCNC: 30 MMOL/L (ref 21–32)
CREAT SERPL-MCNC: 0.56 MG/DL (ref 0.6–1.3)
EOSINOPHIL # BLD AUTO: 0.23 THOUSAND/ÂΜL (ref 0–0.61)
EOSINOPHIL NFR BLD AUTO: 4 % (ref 0–6)
ERYTHROCYTE [DISTWIDTH] IN BLOOD BY AUTOMATED COUNT: 12.6 % (ref 11.6–15.1)
GFR SERPL CREATININE-BSD FRML MDRD: 87 ML/MIN/1.73SQ M
GLUCOSE P FAST SERPL-MCNC: 100 MG/DL (ref 65–99)
HCT VFR BLD AUTO: 42.5 % (ref 34.8–46.1)
HDLC SERPL-MCNC: 87 MG/DL
HGB BLD-MCNC: 14.2 G/DL (ref 11.5–15.4)
IMM GRANULOCYTES # BLD AUTO: 0.01 THOUSAND/UL (ref 0–0.2)
IMM GRANULOCYTES NFR BLD AUTO: 0 % (ref 0–2)
LDLC SERPL CALC-MCNC: 77 MG/DL (ref 0–100)
LYMPHOCYTES # BLD AUTO: 2.08 THOUSANDS/ÂΜL (ref 0.6–4.47)
LYMPHOCYTES NFR BLD AUTO: 35 % (ref 14–44)
MCH RBC QN AUTO: 32.5 PG (ref 26.8–34.3)
MCHC RBC AUTO-ENTMCNC: 33.4 G/DL (ref 31.4–37.4)
MCV RBC AUTO: 97 FL (ref 82–98)
MONOCYTES # BLD AUTO: 0.62 THOUSAND/ÂΜL (ref 0.17–1.22)
MONOCYTES NFR BLD AUTO: 11 % (ref 4–12)
NEUTROPHILS # BLD AUTO: 2.94 THOUSANDS/ÂΜL (ref 1.85–7.62)
NEUTS SEG NFR BLD AUTO: 49 % (ref 43–75)
NRBC BLD AUTO-RTO: 0 /100 WBCS
PLATELET # BLD AUTO: 312 THOUSANDS/UL (ref 149–390)
PMV BLD AUTO: 9.6 FL (ref 8.9–12.7)
POTASSIUM SERPL-SCNC: 4.4 MMOL/L (ref 3.5–5.3)
PROT SERPL-MCNC: 6.5 G/DL (ref 6.4–8.4)
RBC # BLD AUTO: 4.37 MILLION/UL (ref 3.81–5.12)
SODIUM SERPL-SCNC: 134 MMOL/L (ref 135–147)
TRIGL SERPL-MCNC: 78 MG/DL
WBC # BLD AUTO: 5.91 THOUSAND/UL (ref 4.31–10.16)

## 2024-04-19 PROCEDURE — 85025 COMPLETE CBC W/AUTO DIFF WBC: CPT

## 2024-04-19 PROCEDURE — 80061 LIPID PANEL: CPT

## 2024-04-19 PROCEDURE — 36415 COLL VENOUS BLD VENIPUNCTURE: CPT

## 2024-04-19 PROCEDURE — 80053 COMPREHEN METABOLIC PANEL: CPT

## 2024-04-23 ENCOUNTER — RA CDI HCC (OUTPATIENT)
Dept: OTHER | Facility: HOSPITAL | Age: 84
End: 2024-04-23

## 2024-04-30 ENCOUNTER — OFFICE VISIT (OUTPATIENT)
Dept: FAMILY MEDICINE CLINIC | Facility: CLINIC | Age: 84
End: 2024-04-30
Payer: COMMERCIAL

## 2024-04-30 VITALS
OXYGEN SATURATION: 98 % | TEMPERATURE: 97.7 F | WEIGHT: 117.2 LBS | HEART RATE: 75 BPM | SYSTOLIC BLOOD PRESSURE: 120 MMHG | BODY MASS INDEX: 20.77 KG/M2 | RESPIRATION RATE: 16 BRPM | DIASTOLIC BLOOD PRESSURE: 70 MMHG | HEIGHT: 63 IN

## 2024-04-30 DIAGNOSIS — E04.1 THYROID NODULE: ICD-10-CM

## 2024-04-30 DIAGNOSIS — Z96.611 HISTORY OF REVISION OF TOTAL REPLACEMENT OF RIGHT SHOULDER JOINT: ICD-10-CM

## 2024-04-30 DIAGNOSIS — I10 ESSENTIAL HYPERTENSION: Primary | ICD-10-CM

## 2024-04-30 DIAGNOSIS — M81.0 AGE-RELATED OSTEOPOROSIS WITHOUT CURRENT PATHOLOGICAL FRACTURE: ICD-10-CM

## 2024-04-30 DIAGNOSIS — E87.1 HYPONATREMIA: ICD-10-CM

## 2024-04-30 DIAGNOSIS — M25.511 RIGHT SHOULDER PAIN, UNSPECIFIED CHRONICITY: ICD-10-CM

## 2024-04-30 PROBLEM — E44.1 MILD PROTEIN-CALORIE MALNUTRITION (HCC): Status: ACTIVE | Noted: 2024-04-30

## 2024-04-30 PROCEDURE — 3078F DIAST BP <80 MM HG: CPT | Performed by: FAMILY MEDICINE

## 2024-04-30 PROCEDURE — 99214 OFFICE O/P EST MOD 30 MIN: CPT | Performed by: FAMILY MEDICINE

## 2024-04-30 PROCEDURE — 3725F SCREEN DEPRESSION PERFORMED: CPT | Performed by: FAMILY MEDICINE

## 2024-04-30 PROCEDURE — G2211 COMPLEX E/M VISIT ADD ON: HCPCS | Performed by: FAMILY MEDICINE

## 2024-04-30 PROCEDURE — 1159F MED LIST DOCD IN RCRD: CPT | Performed by: FAMILY MEDICINE

## 2024-04-30 PROCEDURE — 3074F SYST BP LT 130 MM HG: CPT | Performed by: FAMILY MEDICINE

## 2024-04-30 PROCEDURE — 1160F RVW MEDS BY RX/DR IN RCRD: CPT | Performed by: FAMILY MEDICINE

## 2024-04-30 RX ORDER — AMOXICILLIN 500 MG/1
CAPSULE ORAL
COMMUNITY
Start: 2024-04-15

## 2024-04-30 NOTE — PROGRESS NOTES
Assessment/Plan:    Essential hypertension  - well controlled on lisinopril 40 mg daily  - Comprehensive metabolic panel; Future  - CBC and differential; Future  - UA (URINE) with reflex to Scope; Future    Hyponatremia  - sodium 134, stable, will recheck labs in 6 months  - Comprehensive metabolic panel; Future    Thyroid nodule  - US thyroid 1/10/2024 showed stability over 5 years, previous FNAB was benign, will check TFT's and repeat US in 2 years  - TSH, 3rd generation with Free T4 reflex; Future    Osteoporosis   - continue calcium/ vitamin D supplements and regular weight bearing exercises, follow up with Endocrine, due for repeat DEXA scan in March 2025     Right shoulder pain  - 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   The patient understands and agrees with the treatment plan.      Subjective:   Chief Complaint   Patient presents with    Hypertension     6 month recheck    Hyponatremia      Patient ID: Sofia Gurrola is a 84 y.o. female presents today for follow up visit for hypertension, hyponatremia, thyroid nodule and review her lab and thyroid US results. Patient is following with [facility] for right shoulder pain, she underwent revision to reverse total shoulder arthroplasty on 9/7/2022, she continues having pain and reduced ROM and taking Tylenol as needed with good relief. Patient offers no other complaints, she is taking her lisinopril as prescribed and reports good home BP readings in 120's/70's.       The following portions of the patient's history were reviewed and updated as appropriate: allergies, current medications, past family history, past medical history, past social history, past surgical history and problem list.    Past Medical History:   Diagnosis Date    Arthritis     Basal cell carcinoma     Disease of thyroid gland      thyroid nodules    Dry eye     Hard of hearing     Hematuria     Hypertension     Leaking of urine     Osteoarthritis     Peritonitis (HCC)     in age 40's due to ruptured apendix    Shoulder joint pain     right- reverse arthroplasty today 2020    Squamous cell skin cancer     Wears glasses     Wears partial dentures     upper     Past Surgical History:   Procedure Laterality Date    APPENDECTOMY      BREAST BIOPSY Right 02/15/2021    stereo, benign    CHOLECYSTECTOMY LAPAROSCOPIC      COLONOSCOPY      EYE SURGERY      HIP SURGERY      JOINT REPLACEMENT Right     hip    LAPAROSCOPIC CHOLECYSTECTOMY      LAPAROSCOPY      MAMMO STEREOTACTIC BREAST BIOPSY RIGHT (ALL INC) Right 02/15/2021    CT ARTHROPLASTY GLENOHUMERAL JOINT TOTAL SHOULDER Right 2020    Procedure: ARTHROPLASTY SHOULDER REVERSE;  Surgeon: Red Peoples MD;  Location: [facility];  Service: Orthopedics    SHOULDER SURGERY Right 2022    TONSILLECTOMY      TUBAL LIGATION      WISDOM TOOTH EXTRACTION       Family History   Problem Relation Age of Onset    Hypertension Mother             Coronary artery disease Father     Hypertension Father             Hyperlipidemia Father     No Known Problems Brother     No Known Problems Daughter     No Known Problems Son     No Known Problems Maternal Grandmother     No Known Problems Maternal Grandfather     No Known Problems Paternal Grandmother     No Known Problems Paternal Grandfather     Alcohol abuse Neg Hx     Substance Abuse Neg Hx     Mental illness Neg Hx      Social History     Socioeconomic History    Marital status: /Civil Union     Spouse name: Not on file    Number of children: Not on file    Years of education: Not on file    Highest education level: Not on file   Occupational History    Not on file   Tobacco Use    Smoking status: Former     Current packs/day: 0.00     Average packs/day: 0.3 packs/day for 15.0 years (3.8 ttl pk-yrs)     Types: Cigarettes      Start date: 3/4/1978     Quit date:      Years since quittin.3    Smokeless tobacco: Never   Vaping Use    Vaping status: Never Used   Substance and Sexual Activity    Alcohol use: Yes     Alcohol/week: 4.0 standard drinks of alcohol     Types: 4 Glasses of wine per week    Drug use: Never    Sexual activity: Not Currently     Partners: Male     Birth control/protection: None   Other Topics Concern    Not on file   Social History Narrative    Not on file     Social Determinants of Health     Financial Resource Strain: Low Risk  (10/3/2023)    Overall Financial Resource Strain (CARDIA)     Difficulty of Paying Living Expenses: Not hard at all   Food Insecurity: Not on file   Transportation Needs: No Transportation Needs (10/3/2023)    PRAPARE - Transportation     Lack of Transportation (Medical): No     Lack of Transportation (Non-Medical): No   Physical Activity: Not on file   Stress: Not on file   Social Connections: Not on file   Intimate Partner Violence: Not on file   Housing Stability: Not on file       Current Outpatient Medications:     amoxicillin (AMOXIL) 500 mg capsule, TAKE 4 CAPSULES BY MOUTH 1 HOUR PRIOR TO PROCEDURE, Disp: , Rfl:     Calcium Carbonate-Vit D-Min (CALCIUM 1200 PO), Take by mouth 2 (two) times a day, Disp: , Rfl:     Cholecalciferol (VITAMIN D) 50 MCG (2000 UT) tablet, Take 2,000 Units by mouth daily, Disp: , Rfl:     estradiol (ESTRACE) 0.1 mg/g vaginal cream, INSERT 1 GRAM INTO THE VAGINA 2 TIMES A WEEK, Disp: 42.5 g, Rfl: 1    lisinopril (ZESTRIL) 40 mg tablet, TAKE 1 TABLET BY MOUTH EVERY DAY, Disp: 90 tablet, Rfl: 3    Multiple Vitamins-Minerals (MULTIVITAMIN WOMEN PO), Take 1 tablet by mouth daily , Disp: , Rfl:     Review of Systems   Constitutional:  Negative for appetite change, chills, fatigue, fever and unexpected weight change.   Respiratory:  Negative for cough, shortness of breath and wheezing.    Cardiovascular:  Negative for chest pain, palpitations and leg  "swelling.   Gastrointestinal:  Negative for abdominal pain, blood in stool, constipation, diarrhea, nausea and vomiting.   Genitourinary:  Negative for dysuria, flank pain, frequency, pelvic pain and urgency.   Musculoskeletal:  Negative for neck pain.        Right shoulder pain   Neurological:  Negative for dizziness, syncope, weakness, numbness and headaches.   Hematological:  Negative for adenopathy.   Psychiatric/Behavioral:  Negative for dysphoric mood and sleep disturbance. The patient is not nervous/anxious.            Objective:    Vitals:    04/30/24 1006   BP: 120/70   Pulse: 75   Resp: 16   Temp: 97.7 °F (36.5 °C)   SpO2: 98%   Weight: 53.2 kg (117 lb 3.2 oz)   Height: 5' 3\" (1.6 m)     Wt Readings from Last 3 Encounters:   04/30/24 53.2 kg (117 lb 3.2 oz)   03/13/24 54 kg (119 lb)   03/13/24 53.6 kg (118 lb 3.2 oz)     BP Readings from Last 3 Encounters:   04/30/24 120/70   10/10/23 138/78   04/04/23 136/68         Physical Exam  Constitutional:       General: She is not in acute distress.     Appearance: She is well-developed.   Cardiovascular:      Rate and Rhythm: Normal rate and regular rhythm.      Heart sounds: Normal heart sounds. No murmur heard.  Pulmonary:      Effort: Pulmonary effort is normal. No respiratory distress.      Breath sounds: Normal breath sounds. No wheezing, rhonchi or rales.   Abdominal:      General: There is no distension.      Palpations: Abdomen is soft. There is no mass.      Tenderness: There is no abdominal tenderness.   Musculoskeletal:      Cervical back: Neck supple.      Right lower leg: No edema.      Left lower leg: No edema.   Lymphadenopathy:      Cervical: No cervical adenopathy.   Neurological:      Mental Status: She is alert and oriented to person, place, and time.   Psychiatric:         Mood and Affect: Mood normal.         Behavior: Behavior normal.         Thought Content: Thought content normal.        Lab Results   Component Value Date    CHOLESTEROL " 180 04/19/2024    CHOLESTEROL 150 09/28/2022    CHOLESTEROL 196 09/29/2021     Lab Results   Component Value Date    HDL 87 04/19/2024    HDL 77 09/28/2022    HDL 99 09/29/2021     Lab Results   Component Value Date    TRIG 78 04/19/2024    TRIG 52 09/28/2022    TRIG 52 09/29/2021     Lab Results   Component Value Date    LDLCALC 77 04/19/2024     Lab Results   Component Value Date    UDZ3LIASCWCD 1.610 10/04/2023     Lab Results   Component Value Date    WBC 5.91 04/19/2024    HGB 14.2 04/19/2024    HCT 42.5 04/19/2024    MCV 97 04/19/2024     04/19/2024     Lab Results   Component Value Date    SODIUM 134 (L) 04/19/2024    K 4.4 04/19/2024    CL 97 04/19/2024    CO2 30 04/19/2024    AGAP 7 04/19/2024    BUN 24 04/19/2024    CREATININE 0.56 (L) 04/19/2024    GLUC 101 09/03/2022    GLUF 100 (H) 04/19/2024    CALCIUM 9.9 04/19/2024    AST 27 04/19/2024    ALT 20 04/19/2024    ALKPHOS 62 04/19/2024    TP 6.5 04/19/2024    TBILI 0.72 04/19/2024    EGFR 87 04/19/2024

## 2024-06-05 ENCOUNTER — PROCEDURE VISIT (OUTPATIENT)
Dept: DERMATOLOGY | Facility: CLINIC | Age: 84
End: 2024-06-05
Payer: COMMERCIAL

## 2024-06-05 VITALS
SYSTOLIC BLOOD PRESSURE: 128 MMHG | OXYGEN SATURATION: 99 % | HEART RATE: 82 BPM | DIASTOLIC BLOOD PRESSURE: 76 MMHG | BODY MASS INDEX: 20.34 KG/M2 | HEIGHT: 63 IN | WEIGHT: 114.8 LBS

## 2024-06-05 DIAGNOSIS — C44.310 BASAL CELL CARCINOMA (BCC) OF SKIN OF FACE, UNSPECIFIED PART OF FACE: ICD-10-CM

## 2024-06-05 PROCEDURE — 12051 INTMD RPR FACE/MM 2.5 CM/<: CPT | Performed by: DERMATOLOGY

## 2024-06-05 PROCEDURE — 17311 MOHS 1 STAGE H/N/HF/G: CPT | Performed by: DERMATOLOGY

## 2024-06-05 NOTE — PATIENT INSTRUCTIONS
"Mohs Microscopic Surgery After Care    WOUND CARE AFTER SURGERY:    Do NOT to remove the pressure bandage for 48 hours. Keep the area clean and dry while this bandage is on.    After removing the bandage for the first time, gently clean the area with soap and water. If the bandage is difficult to remove, getting the bandage wet in the shower will sometimes help soften the adhesive and allow it to be removed more easily.     You will now need to cleanse this area daily in the shower with gentle soap. There is no need to scrub the area. You will need to apply plain Vaseline ointment (this is over the counter and not a prescription) to the site for up to 2 weeks followed by a clean appropriately sized bandage to area.  Non stick dressing and paper tape (or Hypafix) are recommended for sensitive skin but a bandaid is fine if it covers the area well.    All your stitches will dissolve over the next two weeks. You will need to keep these moist with Vaseline and covered with a bandage over the next 2 weeks for them to dissolve appropriately.    RESTRICTIONS:     For two DAYS:   - You will need to take it very easy as this time is highest risk for bleeding. Being a \"couch potato\" during these two days is generally recommended.   - For surgeries on the face/neck/scalp: Avoid leaning down to pick things up off the floor as this brings blood up to your head. Instead, squat down to pick things up.     For two WEEKS:   - No heavy lifting (anything greater than 10 pounds)   - You can start to do slow, gentle activities such as slow walking but nothing to increase your heart rate and blood pressure too much (such as cardiovascular exercise). It is important to take it easy as there is still a risk for bleeding and a high risk popping of stitches open during this time.     If we did surgery near the eyes (including the nose, forehead, front part of your scalp, cheeks): It is VERY common to get a large amount of swelling around your " eyes (puffy eyes). Although less frequent, this can be enough to swell your eyes shut and can also come along with bruising. This should not hurt and is very expected and normal. It is typically worst at ~ 3 days out from your surgery and dramatically better 1 week post-operatively.     MANAGING YOUR PAIN AFTER SURGERY     You can expect to have some pain after surgery. This is normal. The pain is typically worse the first two days after surgery, and quickly begins to get better.     The best strategy for controlling your pain after surgery is around the clock pain control. You can take over the counter Acetaminophen (Tylenol) for discomfort, if no contraindications.     If you are taking this at the maximum dose, you can alternate this with Motrin (ibuprofen or Advil) as well. Alternating these medications with each other allows you to maximize your pain control. In addition to Tylenol and Motrin, you can use heating pads or ice packs on your incisions to help reduce your pain.     How will I alternate your regular strength over-the-counter pain medication?  You will take a dose of pain medication every three hours.   Start by taking 650 mg of Tylenol (2 pills of 325 mg)   3 hours later take 600 mg of Motrin (3 pills of 200 mg)   3 hours after taking the Motrin take 650 mg of Tylenol   3 hours after that take 600 mg of Motrin.    See example - if your first dose of Tylenol is at 12:00 PM     12:00 PM  Tylenol 650 mg (2 pills of 325 mg)    3:00 PM  Motrin 600 mg (3 pills of 200 mg)    6:00 PM  Tylenol 650 mg (2 pills of 325 mg)    9:00 PM  Motrin 600 mg (3 pills of 200 mg)    Continue alternating every 3 hours      Important:   Do not take more than 4000mg of Tylenol or 3200mg of Motrin in a 24-hour period.     What if I still have pain?   If you have pain that is not controlled with the over-the-counter pain medications (Tylenol and Motrin or Advil), don't hesitate to call our staff using the number provided. We  will help make sure you are managing your pain in the best way possible, and if necessary, we can provide a prescription for additional pain medication.       CALL OUR OFFICE IMMEDIATELY FOR ANY SIGNS OF INFECTION:    This includes fever, chills, increased redness, warmth, tenderness, severe discomfort/pain, or pus or foul smell coming from the wound. If you are experiencing any of the above, please call [facility]'s Mohs Department directly at [phone].    IF BLEEDING IS NOTICED:    Place a clean cloth over the area and apply firm pressure for thirty minutes.  Check the wound ONLY after 30 minutes of direct pressure; do not cheat and sneak a peak, as that does not count.  If bleeding persists after 30 minutes of legitimate direct pressure, then try one more round of direct pressure to the area.  Should the bleeding become heavier or not stop after the second attempt, call [facility] directly at [phone]. Your call will get routed to the dermatology surgeon on call even after hours.

## 2024-06-05 NOTE — PROGRESS NOTES
MOHS Procedure Note    Patient: Sofia Gurrola  : 1940  MRN: [id number]  Date: 2024    History of Present Illness: The patient is a 84 y.o. female who presents with complaints of Basal cell carcinoma of the left forehead.     Past Medical History:   Diagnosis Date    Arthritis     Basal cell carcinoma     Basal cell carcinoma 2024    left forehead, mohs    Disease of thyroid gland     thyroid nodules    Dry eye     Hard of hearing     Hematuria     Hypertension     Leaking of urine     Osteoarthritis     Peritonitis (HCC)     in age 40's due to ruptured apendix    Shoulder joint pain     right- reverse arthroplasty today 2020    Squamous cell skin cancer     Wears glasses     Wears partial dentures     upper       Past Surgical History:   Procedure Laterality Date    APPENDECTOMY      BREAST BIOPSY Right 02/15/2021    stereo, benign    CHOLECYSTECTOMY LAPAROSCOPIC      COLONOSCOPY      EYE SURGERY      HIP SURGERY      JOINT REPLACEMENT Right     hip    LAPAROSCOPIC CHOLECYSTECTOMY      LAPAROSCOPY      MAMMO STEREOTACTIC BREAST BIOPSY RIGHT (ALL INC) Right 02/15/2021    MOHS SURGERY Left 2024    BCC left forehead, Dr Gurrola    OR ARTHROPLASTY GLENOHUMERAL JOINT TOTAL SHOULDER Right 2020    Procedure: ARTHROPLASTY SHOULDER REVERSE;  Surgeon: Red Peoples MD;  Location: [facility];  Service: Orthopedics    SHOULDER SURGERY Right 2022    TONSILLECTOMY      TUBAL LIGATION      WISDOM TOOTH EXTRACTION           Current Outpatient Medications:     amoxicillin (AMOXIL) 500 mg capsule, TAKE 4 CAPSULES BY MOUTH 1 HOUR PRIOR TO PROCEDURE, Disp: , Rfl:     Calcium Carbonate-Vit D-Min (CALCIUM 1200 PO), Take by mouth 2 (two) times a day, Disp: , Rfl:     Cholecalciferol (VITAMIN D) 50 MCG (2000 UT) tablet, Take 2,000 Units by mouth daily, Disp: , Rfl:     estradiol (ESTRACE) 0.1 mg/g vaginal cream, INSERT 1 GRAM INTO THE VAGINA 2 TIMES A WEEK, Disp: 42.5 g, Rfl: 1    lisinopril  (ZESTRIL) 40 mg tablet, TAKE 1 TABLET BY MOUTH EVERY DAY, Disp: 90 tablet, Rfl: 3    Multiple Vitamins-Minerals (MULTIVITAMIN WOMEN PO), Take 1 tablet by mouth daily , Disp: , Rfl:     No Known Allergies    Physical Exam:   Vitals:    06/05/24 0942   BP: 128/76   Pulse: 82   SpO2: 99%     General: Awake, Alert, Oriented x 3, Mood and affect appropriate  Respiratory: Respirations even and unlabored  Cardiovascular: Peripheral pulses intact; no edema  Musculoskeletal Exam: n/a    Skin: 0.5 cm x 0.5 cm pink pearly macule    Assessment: Biopsy confirmed basal cell carcinoma at least superficial type of the left forehead.     Plan: Mohs    Time of H&P Completion:0938    MOHS Procedure Timeout      Flowsheet Row Most Recent Value   Timeout: 0951   Patient Identity Verified: Yes   Correct Site Verified: Yes   Correct Procedure Verified: Yes            MOHS Diagnosis/Indication/Location/ID      Flowsheet Row Most Recent Value   Pathology Type Basal cell carcinoma   Anatomic Site left forehead   Indications for [facility] tumor location   MOHS ID [id number]            MOHS Site/Accession/Pre-Post      Flowsheet Row Most Recent Value   Original Site Identified (as submitted by referring clinician) Photo, Referral   Biopsy Accession/Specimen # (as submitted by referring clincian) [id number]   Pre-MOHS Size Length (cm) 0.5   Pre-MOHS Size Width (cm) 0.5   Post-MOHS Size-Length (cm) 0.7   Post MOHS Size-Width (cm) 0.7   Repair Type Intermediate layered closure   Suture Type Vicryl, Fast absorbing gut   Fast Absorbing Suture Size 5   Vicryl Suture Size 5   Final repair length (cm): 2   Anesthetic Used 1% Lidocaine with epinephrine  [1.2]            MOHS Tumor Stage 1 Information      Flowsheet Row Most Recent Value   Tissue Sections (blocks) 2   Microscopic Exam Section 1: No tumor identified in section.   Microscopic Exam Section 2: No tumor identified in section.   Tumor Clear After Stage I? Yes                        Patient  identified, procedure verified, site identified and verified. Time out completed. Surgical removal of the lesion discussed with the patient (risks and benefits, including possibility of scarring, infection, recurrence or potential for further treatment)  I have specifically identified the site with the patient. I have discussed the fact that the patient will have a scar after the procedure regardless of granulation or repair with sutures. I have discussed that the repair options can range from granulation in some cases to linear or curvilinear closures to larger flaps or grafts.  There are sometimes flaps or grafts used that require multiples stages of surgery and will not be completed today, rather be completed over a series of appointments. I have discussed that occasionally due to location, size or depth of the lesion I may recommend consultation with and transfer of care for further removal or the reconstruction to another provider such as ophthalmology surgery, plastic surgery, ENT surgery, or surgical oncology. There are cases in which other testing such as imaging with MRI or CT scan or testing of lymph nodes is recommended because of the nature/depth/location of tumor seen during the removal. There is a risk of injury to nerves causing temporary or permanent numbness or the inability to move muscles full such as the inability to lift eyebrows. Questions answered and verbal and written consent was obtained.    The tumor qualifies for Mohs based on AUC criteria. Dr. Gurrola served as the surgeon and pathologist during the procedure.    With the patient in the supine position and under adequate local anesthesia with 1% lidocaine with epinephrine 1:100,000, the defect was scrubbed with Chlorhexidine. Sterile drapes were placed from the sterile tray.  Because of the location of the surgical defect, an intermediate closure was judged to give the best possible cosmetic and functional result.  The edges of the  defect were carefully debrided removing any dead or coagulated tissue.      Hemostasis was obtained by pinpoint electrocoagulation.  Careful planning of removal of redundant tissue at either end of the defect was drawn out so that the suture lines would fall in the optimal orientation with regard to the relaxed skin tension lines.  These were then removed with a #15 blade scalpel.  The wound was then approximated by a deep layer of buried vertical mattress sutures and the cutaneous margins were approximated and closed by superficial sutures as noted above.  Estimated blood loss was less than 5 mL.      The patient tolerated the procedure well.  The wound was dressed with petrolatum, a non-stick pad, and a compression dressing.     Pool Gurrola MD served as the surgeon and pathologist during the procedure.    Postoperative care: Wound care discussed at length.  I urged the patient to call us if any problems or question should arise.     Complications: none  Post-op medications: none  Patient condition after procedure: stable  Discharge plans: Plan for follow up as planned with general dermatologist for skin checks or sooner if needed for healing surgical site.     BCC cleared with 1 stage of mohs and repaired with 2cm closure.  Well tolerated.  S/R not needed as all dissolving sutures used.    Scribe Attestation      I,:  Carey Jimenez am acting as a scribe while in the presence of the attending physician.:       I,:  Pool Gurrola MD personally performed the services described in this documentation    as scribed in my presence.:

## 2024-06-07 NOTE — TELEPHONE ENCOUNTER
Insurance verification submitted for year 2021  [Admit to Program     (Add Program Admission information to a new column in the Admit/Discharge Flowsheet)] : Admit to program [Every ___ month(s)] : Medication Management: Every [unfilled] month(s) [Every ___ week(s)] : Psychotherapy: Every [unfilled] week(s) [Individual Therapy] : Individual Therapy [FreeTextEntry4] : Pt accepted to [facility] for psychiatry and other clinical services to treat his MDD and KURT. Pt will be seen for follow up appointment by Dr. Thaddeus Coombs and was added to individual psychotherapy waitlist.

## 2024-09-18 ENCOUNTER — OFFICE VISIT (OUTPATIENT)
Dept: DERMATOLOGY | Facility: CLINIC | Age: 84
End: 2024-09-18
Payer: COMMERCIAL

## 2024-09-18 VITALS — TEMPERATURE: 98.8 F | WEIGHT: 115 LBS | HEIGHT: 64 IN | BODY MASS INDEX: 19.63 KG/M2

## 2024-09-18 DIAGNOSIS — L82.1 SEBORRHEIC KERATOSIS: ICD-10-CM

## 2024-09-18 DIAGNOSIS — L57.0 KERATOSIS, ACTINIC: ICD-10-CM

## 2024-09-18 DIAGNOSIS — D18.01 CHERRY ANGIOMA: ICD-10-CM

## 2024-09-18 DIAGNOSIS — D22.62 MULTIPLE BENIGN MELANOCYTIC NEVI OF UPPER AND LOWER EXTREMITIES AND TRUNK: ICD-10-CM

## 2024-09-18 DIAGNOSIS — D22.72 MULTIPLE BENIGN MELANOCYTIC NEVI OF UPPER AND LOWER EXTREMITIES AND TRUNK: ICD-10-CM

## 2024-09-18 DIAGNOSIS — D22.71 MULTIPLE BENIGN MELANOCYTIC NEVI OF UPPER AND LOWER EXTREMITIES AND TRUNK: ICD-10-CM

## 2024-09-18 DIAGNOSIS — Z85.828 HISTORY OF BASAL CELL CARCINOMA: ICD-10-CM

## 2024-09-18 DIAGNOSIS — D22.61 MULTIPLE BENIGN MELANOCYTIC NEVI OF UPPER AND LOWER EXTREMITIES AND TRUNK: ICD-10-CM

## 2024-09-18 DIAGNOSIS — Z85.89 HISTORY OF SQUAMOUS CELL CARCINOMA: Primary | ICD-10-CM

## 2024-09-18 DIAGNOSIS — L81.4 LENTIGINES: ICD-10-CM

## 2024-09-18 DIAGNOSIS — D22.5 MULTIPLE BENIGN MELANOCYTIC NEVI OF UPPER AND LOWER EXTREMITIES AND TRUNK: ICD-10-CM

## 2024-09-18 PROCEDURE — 99214 OFFICE O/P EST MOD 30 MIN: CPT | Performed by: DERMATOLOGY

## 2024-09-18 PROCEDURE — 17000 DESTRUCT PREMALG LESION: CPT | Performed by: DERMATOLOGY

## 2024-09-18 PROCEDURE — 17003 DESTRUCT PREMALG LES 2-14: CPT | Performed by: DERMATOLOGY

## 2024-09-18 NOTE — PROGRESS NOTES
"[facility] Dermatology Clinic Note     Patient Name: Sofia Gurrola  Encounter Date: 9/18/24     Have you been cared for by a [facility] Dermatologist in the last 3 years and, if so, which description applies to you?    Yes.  I have been here within the last 3 years, and my medical history has NOT changed since that time.  I am FEMALE/of child-bearing potential.    REVIEW OF SYSTEMS:  Have you recently had or currently have any of the following? No changes in my recent health.   PAST MEDICAL HISTORY:  Have you personally ever had or currently have any of the following?  If \"YES,\" then please provide more detail. No changes in my medical history.   HISTORY OF IMMUNOSUPPRESSION: Do you have a history of any of the following:  Systemic Immunosuppression such as Diabetes, Biologic or Immunotherapy, Chemotherapy, Organ Transplantation, Bone Marrow Transplantation or Prednisone?  No     Answering \"YES\" requires the addition of the dotphrase \"IMMUNOSUPPRESSED\" as the first diagnosis of the patient's visit.   FAMILY HISTORY:  Any \"first degree relatives\" (parent, brother, sister, or child) with the following?    No changes in my family's known health.   PATIENT EXPERIENCE:    Do you want the Dermatologist to perform a COMPLETE skin exam today including a clinical examination under the \"bra and underwear\" areas?  Yes  If necessary, do we have your permission to call and leave a detailed message on your Preferred Phone number that includes your specific medical information?  Yes      No Known Allergies   Current Outpatient Medications:     Calcium Carbonate-Vit D-Min (CALCIUM 1200 PO), Take by mouth 2 (two) times a day, Disp: , Rfl:     Cholecalciferol (VITAMIN D) 50 MCG (2000 UT) tablet, Take 2,000 Units by mouth daily, Disp: , Rfl:     estradiol (ESTRACE) 0.1 mg/g vaginal cream, INSERT 1 GRAM INTO THE VAGINA 2 TIMES A WEEK, Disp: 42.5 g, Rfl: 1    lisinopril (ZESTRIL) 40 mg tablet, TAKE 1 TABLET BY MOUTH EVERY DAY, Disp: 90 " tablet, Rfl: 3    Multiple Vitamins-Minerals (MULTIVITAMIN WOMEN PO), Take 1 tablet by mouth daily , Disp: , Rfl:     amoxicillin (AMOXIL) 500 mg capsule, TAKE 4 CAPSULES BY MOUTH 1 HOUR PRIOR TO PROCEDURE (Patient not taking: Reported on 9/18/2024), Disp: , Rfl:         Patient present for 6 month skin check, hx of BCC x2 most recent was 6/5/24 and SCC. No SOC today.    Whom besides the patient is providing clinical information about today's encounter?   NO ADDITIONAL HISTORIAN (patient alone provided history)    Physical Exam and Assessment/Plan by Diagnosis:      ACTINIC KERATOSES  Physical Exam:  Anatomic Location Affected:  Left dorsal hand, left forearm  Morphological Description:  Thin pink papule(s) with gritty scale       Assessment and Plan:  Based on a thorough discussion of this condition and the management approach to it (including a comprehensive discussion of the known risks, side effects and potential benefits of treatment), the patient (family) agrees to implement the following specific plan:  Treated with cryotherapy today; written and verbal consent obtained     PROCEDURE:  DESTRUCTION OF PRE-MALIGNANT LESIONS  After a thorough discussion of treatment options and risk/benefits/alternatives (including but not limited to local pain, scarring, dyspigmentation, blistering, and possible superinfection), verbal and written consent were obtained and the aforementioned lesions were treated on with cryotherapy using liquid nitrogen x 2 cycles for 5-10 seconds. The patient tolerated the procedure well, and after-care instructions were provided.     TOTAL NUMBER of 4 pre-malignant lesions were treated today on the ANATOMIC LOCATION: left dorsal hand, left forearm.       Patient instructions:  Your pre-cancerous lesions (called actinic keratosis) were treated with liquid nitrogen today. The treated areas will get more red, crusted over the next few days. There might be some blistering. Apply vaseline to the  treated area for the next week to help it heal fully. Do not pick at the area. Return in 3-4 weeks for another round of liquid nitrogen treatment if lesion(s)  fails to fully resolve.        HISTORY OF BASAL CELL CARCINOMA     Physical Exam:  Anatomic Location Affected:  Left forearm, left forehead 6/5/24  Morphological Description of scar:  Well healed surgical scar  Suspected Recurrence: No        Additional History of Present Condition:  History of basal cell carcinoma with no sign of recurrence. Left forearm treated with curettage and desiccation procedure. Previous Accession number: [id number]. Left forehead done by Dr. Gurrola in [facility] on 6/5/24     Assessment and Plan:  Based on a thorough discussion of this condition and the management approach to it (including a comprehensive discussion of the known risks, side effects and potential benefits of treatment), the patient (family) agrees to implement the following specific plan:  Monitor for recurrence.  When outside we recommend using a wide brim hat, sunglasses, long sleeve and pants, sunscreen with SPF 30+ with reapplication every 2 hours, or SPF specific clothing         How can basal cell carcinoma be prevented?  The most important way to prevent BCC is to avoid sunburn. This is especially important in childhood and early life. Fair skinned individuals and those with a personal or family history of BCC should protect their skin from sun exposure daily, year-round and lifelong.  Stay indoors or under the shade in the middle of the day   Wear covering clothing   Apply high protection factor SPF50+ broad-spectrum sunscreens generously to exposed skin if outdoors   Avoid indoor tanning (sun beds, solaria)  Oral nicotinamide (vitamin B3) in a dose of 500 mg twice daily may reduce the number and severity of BCCs.     What is the outlook for basal cell carcinoma?  Most BCCs are cured by treatment. Cure is most likely if treatment is undertaken when the lesion is  "small.  About 50% of people with BCC develop a second one within 3 years of the first. They are also at increased risk of other skin cancers, especially melanoma. Regular self-skin examinations and long-term annual skin checks by an experienced health professional are recommended.         HISTORY OF SQUAMOUS CELL CARCINOMA      Physical Exam:  Anatomic Location Affected:  Dorsal left hand  Morphological Description of Scar:  Well healed surgical scar  Suspected Recurrence: no  Regional adenopathy: no     Assessment and Plan:  Based on a thorough discussion of this condition and the management approach to it (including a comprehensive discussion of the known risks, side effects and potential benefits of treatment), the patient (family) agrees to implement the following specific plan:  Monitor for recurrence  When outside we recommend using a wide brim hat, sunglasses, long sleeve and pants, sunscreen with SPF 30+ with reapplication every 2 hours, or SPF specific clothing          HIGGINS ANGIOMAS     Physical Exam:  Anatomic Location Affected:  Trunk and extremities  Morphological Description:  Scattered cherry red papules  Denies pain, itch, bleeding. No treatments tried. Present for years. Present constantly; no modifying factors which make it worse or better.     Assessment and Plan:  Based on a thorough discussion of this condition and the management approach to it (including a comprehensive discussion of the known risks, side effects and potential benefits of treatment), the patient (family) agrees to implement the following specific plan:  Reassure benign        SEBORRHEIC KERATOSIS; NON-INFLAMED     Physical Exam:  Anatomic Location Affected:  Trunk and extremities  Morphological Description:  Waxy, smooth to warty textured, yellow to brownish-grey to dark brown to blackish, discrete, \"stuck-on\" appearing papules.  Present for years. Denies pain, itch, bleeding.      Additional History of Present Condition:  " "Present constantly; no modifying factors which make it worse or better. No prior treatment.       Assessment and Plan:  Based on a thorough discussion of this condition and the management approach to it (including a comprehensive discussion of the known risks, side effects and potential benefits of treatment), the patient (family) agrees to implement the following specific plan:  Reassure benign  Use sun protection.  Apply SPF 30 or higher at least three times a day.  Wear sun protecting clothing and hats.        SOLAR LENTIGINES   OTHER SKIN CHANGES DUE TO CHRONIC EXPOSURE TO NONIONIZING RADIATION     Physical Exam:  Anatomic Location Affected:  Sun exposed areas of back, chest, arms, legs  Morphological Description:  Multiple scattered brown to tan evenly pigmented macules   Denies pain, itch, bleeding. No treatments tried. Present for months - years. Reports getting newer lesions with sun exposure.         Assessment and Plan:  Based on a thorough discussion of this condition and the management approach to it (including a comprehensive discussion of the known risks, side effects and potential benefits of treatment), the patient (family) agrees to implement the following specific plan:  Reassure benign  Use sun protection.  Apply SPF 30 or higher at least three times a day.  Wear sun protecting clothing and hats.         MULTIPLE MELANOCYTIC NEVI (\"Moles\")     Physical Exam:  Anatomic Location Affected: Trunk and extremities  Morphological Description:  Scattered, round to ovoid, symmetrical-appearing, even bordered, skin colored to dark brown macules/papules  Denies pain, itch, bleeding. No treatments tried. Present for years. Present constantly; no modifying factors which make it worse or better. Denies actively changing or growing moles.      Assessment and Plan:  Based on a thorough discussion of this condition and the management approach to it (including a comprehensive discussion of the known risks, side " effects and potential benefits of treatment), the patient (family) agrees to implement the following specific plan:  Reassure benign  Monitor for changes  Use sun protection.  Apply SPF 30 or higher at least three times a day.  Wear sun protecting clothing and hats.       Worrisome signs of skin malignancy discussed, questions answered. Regular self-skin check discussed. Advised to call or return to office if patient notices any spots of concern, rapidly growing/changing lesions, bleeding lesions, non-healing lesions. Advised regular SPF use.      Scalp not examined       Scribe Attestation      I,:  Tequila Greer am acting as a scribe while in the presence of the attending physician.:       I,:  Placido Grey MD personally performed the services described in this documentation    as scribed in my presence.:

## 2024-10-23 ENCOUNTER — APPOINTMENT (OUTPATIENT)
Dept: LAB | Facility: CLINIC | Age: 84
End: 2024-10-23
Payer: COMMERCIAL

## 2024-10-23 ENCOUNTER — RA CDI HCC (OUTPATIENT)
Dept: OTHER | Facility: HOSPITAL | Age: 84
End: 2024-10-23

## 2024-10-23 DIAGNOSIS — N95.2 VAGINAL ATROPHY: ICD-10-CM

## 2024-10-23 DIAGNOSIS — I10 ESSENTIAL HYPERTENSION: ICD-10-CM

## 2024-10-23 DIAGNOSIS — E87.1 HYPONATREMIA: ICD-10-CM

## 2024-10-23 LAB
ALBUMIN SERPL BCG-MCNC: 4.1 G/DL (ref 3.5–5)
ALP SERPL-CCNC: 77 U/L (ref 34–104)
ALT SERPL W P-5'-P-CCNC: 18 U/L (ref 7–52)
ANION GAP SERPL CALCULATED.3IONS-SCNC: 10 MMOL/L (ref 4–13)
AST SERPL W P-5'-P-CCNC: 24 U/L (ref 13–39)
BACTERIA UR QL AUTO: ABNORMAL /HPF
BASOPHILS # BLD AUTO: 0.03 THOUSANDS/ΜL (ref 0–0.1)
BASOPHILS NFR BLD AUTO: 1 % (ref 0–1)
BILIRUB SERPL-MCNC: 0.4 MG/DL (ref 0.2–1)
BILIRUB UR QL STRIP: NEGATIVE
BUN SERPL-MCNC: 28 MG/DL (ref 5–25)
CALCIUM SERPL-MCNC: 9.7 MG/DL (ref 8.4–10.2)
CHLORIDE SERPL-SCNC: 95 MMOL/L (ref 96–108)
CLARITY UR: CLEAR
CO2 SERPL-SCNC: 27 MMOL/L (ref 21–32)
COLOR UR: YELLOW
CREAT SERPL-MCNC: 0.58 MG/DL (ref 0.6–1.3)
EOSINOPHIL # BLD AUTO: 0.17 THOUSAND/ΜL (ref 0–0.61)
EOSINOPHIL NFR BLD AUTO: 3 % (ref 0–6)
ERYTHROCYTE [DISTWIDTH] IN BLOOD BY AUTOMATED COUNT: 12.5 % (ref 11.6–15.1)
GFR SERPL CREATININE-BSD FRML MDRD: 86 ML/MIN/1.73SQ M
GLUCOSE P FAST SERPL-MCNC: 87 MG/DL (ref 65–99)
GLUCOSE UR STRIP-MCNC: NEGATIVE MG/DL
HCT VFR BLD AUTO: 38.5 % (ref 34.8–46.1)
HGB BLD-MCNC: 13 G/DL (ref 11.5–15.4)
HGB UR QL STRIP.AUTO: ABNORMAL
IMM GRANULOCYTES # BLD AUTO: 0.04 THOUSAND/UL (ref 0–0.2)
IMM GRANULOCYTES NFR BLD AUTO: 1 % (ref 0–2)
KETONES UR STRIP-MCNC: ABNORMAL MG/DL
LEUKOCYTE ESTERASE UR QL STRIP: ABNORMAL
LYMPHOCYTES # BLD AUTO: 1.74 THOUSANDS/ΜL (ref 0.6–4.47)
LYMPHOCYTES NFR BLD AUTO: 29 % (ref 14–44)
MCH RBC QN AUTO: 32.3 PG (ref 26.8–34.3)
MCHC RBC AUTO-ENTMCNC: 33.8 G/DL (ref 31.4–37.4)
MCV RBC AUTO: 96 FL (ref 82–98)
MONOCYTES # BLD AUTO: 0.7 THOUSAND/ΜL (ref 0.17–1.22)
MONOCYTES NFR BLD AUTO: 12 % (ref 4–12)
MUCOUS THREADS UR QL AUTO: ABNORMAL
NEUTROPHILS # BLD AUTO: 3.41 THOUSANDS/ΜL (ref 1.85–7.62)
NEUTS SEG NFR BLD AUTO: 54 % (ref 43–75)
NITRITE UR QL STRIP: NEGATIVE
NON-SQ EPI CELLS URNS QL MICRO: ABNORMAL /HPF
NRBC BLD AUTO-RTO: 0 /100 WBCS
PH UR STRIP.AUTO: 6 [PH]
PLATELET # BLD AUTO: 362 THOUSANDS/UL (ref 149–390)
PMV BLD AUTO: 9.7 FL (ref 8.9–12.7)
POTASSIUM SERPL-SCNC: 4.8 MMOL/L (ref 3.5–5.3)
PROT SERPL-MCNC: 6.7 G/DL (ref 6.4–8.4)
PROT UR STRIP-MCNC: NEGATIVE MG/DL
RBC # BLD AUTO: 4.02 MILLION/UL (ref 3.81–5.12)
RBC #/AREA URNS AUTO: ABNORMAL /HPF
SODIUM SERPL-SCNC: 132 MMOL/L (ref 135–147)
SP GR UR STRIP.AUTO: 1.02 (ref 1–1.03)
TSH SERPL DL<=0.05 MIU/L-ACNC: 2.23 UIU/ML (ref 0.45–4.5)
UROBILINOGEN UR STRIP-ACNC: <2 MG/DL
WBC # BLD AUTO: 6.09 THOUSAND/UL (ref 4.31–10.16)
WBC #/AREA URNS AUTO: ABNORMAL /HPF

## 2024-10-23 PROCEDURE — 81001 URINALYSIS AUTO W/SCOPE: CPT

## 2024-10-23 PROCEDURE — 84443 ASSAY THYROID STIM HORMONE: CPT

## 2024-10-23 PROCEDURE — 36415 COLL VENOUS BLD VENIPUNCTURE: CPT

## 2024-10-23 PROCEDURE — 80053 COMPREHEN METABOLIC PANEL: CPT

## 2024-10-23 PROCEDURE — 85025 COMPLETE CBC W/AUTO DIFF WBC: CPT

## 2024-10-23 RX ORDER — ESTRADIOL 0.1 MG/G
CREAM VAGINAL
Qty: 42.5 G | Refills: 3 | Status: SHIPPED | OUTPATIENT
Start: 2024-10-23

## 2024-10-30 ENCOUNTER — OFFICE VISIT (OUTPATIENT)
Dept: FAMILY MEDICINE CLINIC | Facility: CLINIC | Age: 84
End: 2024-10-30
Payer: COMMERCIAL

## 2024-10-30 VITALS
HEART RATE: 80 BPM | RESPIRATION RATE: 15 BRPM | BODY MASS INDEX: 20.41 KG/M2 | SYSTOLIC BLOOD PRESSURE: 132 MMHG | OXYGEN SATURATION: 100 % | TEMPERATURE: 97.5 F | HEIGHT: 63 IN | WEIGHT: 115.2 LBS | DIASTOLIC BLOOD PRESSURE: 76 MMHG

## 2024-10-30 DIAGNOSIS — I10 ESSENTIAL HYPERTENSION: ICD-10-CM

## 2024-10-30 DIAGNOSIS — Z12.31 ENCOUNTER FOR SCREENING MAMMOGRAM FOR MALIGNANT NEOPLASM OF BREAST: ICD-10-CM

## 2024-10-30 DIAGNOSIS — Z00.00 MEDICARE ANNUAL WELLNESS VISIT, SUBSEQUENT: Primary | ICD-10-CM

## 2024-10-30 DIAGNOSIS — E87.1 HYPONATREMIA: ICD-10-CM

## 2024-10-30 DIAGNOSIS — R31.29 HEMATURIA, MICROSCOPIC: ICD-10-CM

## 2024-10-30 PROCEDURE — 99213 OFFICE O/P EST LOW 20 MIN: CPT | Performed by: FAMILY MEDICINE

## 2024-10-30 PROCEDURE — G0439 PPPS, SUBSEQ VISIT: HCPCS | Performed by: FAMILY MEDICINE

## 2024-10-30 NOTE — PROGRESS NOTES
Ambulatory Visit  Name: Sofia Gurrola      : 1940      MRN: [id number]  Encounter Provider: Madelyn Graham MD  Encounter Date: 10/30/2024   Encounter department: [facility]    Assessment & Plan    Medicare annual wellness visit, subsequent  - discussed Td and  RSV vaccine  - Mammo screening bilateral w 3d and cad    Essential hypertension  - well controlled on lisinopril 40 mg daily        - Lipid Panel with Direct LDL reflex       - Comprehensive metabolic panel    Hyponatremia  - sodium 132, stab;e, will recheck lab work prior to next visit       - Comprehensive metabolic panel    Hematuria, microscopic  - patient was previously evaluated by Urology and had negative cystoscopy and CT imaging in , will obtain US kidneys and repeat ua prior to next visit       - US kidney and bladder       - UA (URINE) with reflex to Scope    Right shoulder pain  - follow up with Ortho    Return in 6 months or sooner as needed. Preventive health issues were discussed with patient, and age appropriate screening tests were ordered as noted in patient's After Visit Summary. Personalized health advice and appropriate referrals for health education or preventive services given if needed, as noted in patient's After Visit Summary.    History of Present Illness     Patient presents today for AWV and follow up for hypertension. Patient continues having issues with her right shoulder after undergoing revision total shoulder arthroplasty on 2022 and following with Ortho at [facility]. Patient has no other complaints.       Hypertension  Pertinent negatives include no chest pain, headaches, palpitations or shortness of breath.      Patient Care Team:  Madelyn Graham MD as PCP - General (Family Medicine)  Magalys Perdomo MD as PCP - Endocrinology (Endocrinology)    Review of Systems   Constitutional:  Negative for appetite change, chills, fatigue, fever and unexpected  weight change.   HENT:  Negative for ear pain, rhinorrhea and sore throat.    Eyes:  Negative for pain, discharge, redness, itching and visual disturbance.   Respiratory:  Negative for cough, shortness of breath and wheezing.    Cardiovascular:  Negative for chest pain, palpitations and leg swelling.   Gastrointestinal:  Negative for abdominal pain, blood in stool, constipation, diarrhea, nausea and vomiting.   Endocrine: Negative for cold intolerance, heat intolerance, polydipsia and polyuria.   Genitourinary:  Negative for dysuria, flank pain, frequency, pelvic pain and urgency.   Musculoskeletal:         Right shoulder pain   Neurological:  Negative for dizziness, syncope, weakness, numbness and headaches.   Hematological:  Negative for adenopathy.   Psychiatric/Behavioral:  Negative for dysphoric mood and sleep disturbance. The patient is not nervous/anxious.      Medical History Reviewed by provider this encounter:       Annual Wellness Visit Questionnaire   Sofia is here for her Subsequent Wellness visit.     Health Risk Assessment:   Patient rates overall health as good. Patient feels that their physical health rating is slightly better. Patient is satisfied with their life. Eyesight was rated as same. Hearing was rated as same. Patient feels that their emotional and mental health rating is same. Patients states they are never, rarely angry. Patient states they are sometimes unusually tired/fatigued. Pain experienced in the last 7 days has been some. Patient's pain rating has been 3/10. Patient states that she has experienced no weight loss or gain in last 6 months.     Depression Screening:   PHQ-2 Score: 0      Fall Risk Screening:   In the past year, patient has experienced: no history of falling in past year      Urinary Incontinence Screening:   Patient has not leaked urine accidently in the last six months.     Home Safety:  Patient does not have trouble with stairs inside or outside of their home.  Patient has working smoke alarms and has working carbon monoxide detector. Home safety hazards include: none.     Nutrition:   Current diet is Regular.     Medications:   Patient is currently taking over-the-counter supplements. OTC medications include: see medication list. Patient is able to manage medications.     Activities of Daily Living (ADLs)/Instrumental Activities of Daily Living (IADLs):   Walk and transfer into and out of bed and chair?: Yes  Dress and groom yourself?: Yes    Bathe or shower yourself?: Yes    Feed yourself? Yes  Do your laundry/housekeeping?: Yes  Manage your money, pay your bills and track your expenses?: Yes  Make your own meals?: Yes    Do your own shopping?: Yes    Durable Medical Equipment Suppliers  None    Previous Hospitalizations:   Any hospitalizations or ED visits within the last 12 months?: No      Advance Care Planning:   Living will: Yes    Durable POA for healthcare: Yes    Advanced directive: Yes      Cognitive Screening:   Provider or family/friend/caregiver concerned regarding cognition?: No    PREVENTIVE SCREENINGS      Cardiovascular Screening:    General: Screening Current      Diabetes Screening:     General: Screening Current      Colorectal Cancer Screening:     General: Patient Declines      Breast Cancer Screening:     General: Screening Current      Cervical Cancer Screening:    General: Screening Not Indicated      Osteoporosis Screening:    General: Screening Not Indicated and History Osteoporosis      Abdominal Aortic Aneurysm (AAA) Screening:        General: Screening Not Indicated      Lung Cancer Screening:     General: Screening Not Indicated    Screening, Brief Intervention, and Referral to Treatment (SBIRT)    Screening  Typical number of drinks in a day: 1  Typical number of drinks in a week: 2  Interpretation: Low risk drinking behavior.    AUDIT-C Screenin) How often did you have a drink containing alcohol in the past year? 2 to 3 times a  week  2) How many drinks did you have on a typical day when you were drinking in the past year? 1 to 2  3) How often did you have 6 or more drinks on one occasion in the past year? never    AUDIT-C Score: 3  Interpretation: Score 3-12 (female): POSITIVE screen for alcohol misuse    AUDIT Screenin) How often during the last year have you found that you were not able to stop drinking once you had started? 0 - never  5) How often during the last year have you failed to do what was normally expected from you because of drinking? 0 - never  6) How often during the last year have you needed a first drink in the morning to get yourself going after a heavy drinking session? 0 - never  7) How often during the last year have you had a feeling of guilt or remorse after drinking? 0 - never  8) How often during the last year have you been unable to remember what happened the night before because you had been drinking? 0 - never  9) Have you or someone else been injured as a result of your drinking? 0 - no  10) Has a relative or friend or a doctor or another health worker been concerned about your drinking or suggested you cut down? 0 - no    AUDIT Score: 3  Interpretation: Low risk alcohol consumption    Single Item Drug Screening:  How often have you used an illegal drug (including marijuana) or a prescription medication for non-medical reasons in the past year? never    Single Item Drug Screen Score: 0  Interpretation: Negative screen for possible drug use disorder    Social Determinants of Health     Financial Resource Strain: Low Risk  (10/3/2023)    Overall Financial Resource Strain ([facility])     Difficulty of Paying Living Expenses: Not hard at all   Food Insecurity: No Food Insecurity (10/23/2024)    Hunger Vital Sign     Worried About Running Out of Food in the Last Year: Never true     Ran Out of Food in the Last Year: Never true   Transportation Needs: No Transportation Needs (10/23/2024)    PRAPARE -  "Transportation     Lack of Transportation (Medical): No     Lack of Transportation (Non-Medical): No   Housing Stability: Low Risk  (10/23/2024)    Housing Stability Vital Sign     Unable to Pay for Housing in the Last Year: No     Number of Times Moved in the Last Year: 0     Homeless in the Last Year: No   Utilities: Not At Risk (10/23/2024)    [facility] Utilities     Threatened with loss of utilities: No       Objective     /86   Pulse 80   Temp 97.5 °F (36.4 °C)   Resp 15   Ht 5' 3\" (1.6 m)   Wt 52.3 kg (115 lb 3.2 oz)   SpO2 100%   BMI 20.41 kg/m²     Physical Exam  Constitutional:       General: She is not in acute distress.  HENT:      Right Ear: Tympanic membrane and ear canal normal.      Left Ear: Tympanic membrane and ear canal normal.      Mouth/Throat:      Mouth: Mucous membranes are moist.      Pharynx: Oropharynx is clear.   Cardiovascular:      Rate and Rhythm: Normal rate and regular rhythm.   Pulmonary:      Effort: Pulmonary effort is normal. No respiratory distress.      Breath sounds: Normal breath sounds. No wheezing, rhonchi or rales.   Abdominal:      General: There is no distension.      Palpations: Abdomen is soft. There is no mass.      Tenderness: There is no abdominal tenderness.   Musculoskeletal:      Cervical back: Neck supple.      Right lower leg: No edema.      Left lower leg: No edema.   Lymphadenopathy:      Cervical: No cervical adenopathy.   Neurological:      Mental Status: She is alert and oriented to person, place, and time.   Psychiatric:         Mood and Affect: Mood normal.         Behavior: Behavior normal.       Lab Results   Component Value Date    CHOLESTEROL 180 04/19/2024    CHOLESTEROL 150 09/28/2022    CHOLESTEROL 196 09/29/2021     Lab Results   Component Value Date    HDL 87 04/19/2024    HDL 77 09/28/2022    HDL 99 09/29/2021     Lab Results   Component Value Date    TRIG 78 04/19/2024    TRIG 52 09/28/2022    TRIG 52 09/29/2021     .[facility]  Lab Results "   Component Value Date    SODIUM 132 (L) 10/23/2024    K 4.8 10/23/2024    CL 95 (L) 10/23/2024    CO2 27 10/23/2024    AGAP 10 10/23/2024    BUN 28 (H) 10/23/2024    CREATININE 0.58 (L) 10/23/2024    GLUC 101 09/03/2022    GLUF 87 10/23/2024    CALCIUM 9.7 10/23/2024    AST 24 10/23/2024    ALT 18 10/23/2024    ALKPHOS 77 10/23/2024    TP 6.7 10/23/2024    TBILI 0.40 10/23/2024    EGFR 86 10/23/2024     Lab Results   Component Value Date    [id number] 2.225 10/23/2024     Lab Results   Component Value Date    WBC 6.09 10/23/2024    HGB 13.0 10/23/2024    HCT 38.5 10/23/2024    MCV 96 10/23/2024     10/23/2024

## 2024-10-30 NOTE — PATIENT INSTRUCTIONS
Medicare Preventive Visit Patient Instructions  Thank you for completing your Welcome to Medicare Visit or Medicare Annual Wellness Visit today. Your next wellness visit will be due in one year (10/31/2025).  The screening/preventive services that you may require over the next 5-10 years are detailed below. Some tests may not apply to you based off risk factors and/or age. Screening tests ordered at today's visit but not completed yet may show as past due. Also, please note that scanned in results may not display below.  Preventive Screenings:  Service Recommendations Previous Testing/Comments   Colorectal Cancer Screening  * Colonoscopy    * Fecal Occult Blood Test (FOBT)/Fecal Immunochemical Test (FIT)  * Fecal DNA/Cologuard Test  * Flexible Sigmoidoscopy Age: 45-75 years old   Colonoscopy: every 10 years (may be performed more frequently if at higher risk)  OR  FOBT/FIT: every 1 year  OR  Cologuard: every 3 years  OR  Sigmoidoscopy: every 5 years  Screening may be recommended earlier than age 45 if at higher risk for colorectal cancer. Also, an individualized decision between you and your healthcare provider will decide whether screening between the ages of 76-85 would be appropriate. Colonoscopy: Not on file  FOBT/FIT: Not on file  Cologuard: Not on file  Sigmoidoscopy: Not on file          Breast Cancer Screening Age: 40+ years old  Frequency: every 1-2 years  Not required if history of left and right mastectomy Mammogram: 03/13/2024    Screening Current   Cervical Cancer Screening Between the ages of 21-29, pap smear recommended once every 3 years.   Between the ages of 30-65, can perform pap smear with HPV co-testing every 5 years.   Recommendations may differ for women with a history of total hysterectomy, cervical cancer, or abnormal pap smears in past. Pap Smear: 02/07/2023    Screening Not Indicated   Hepatitis C Screening Once for adults born between 1945 and 1965  More frequently in patients at high  risk for Hepatitis C Hep C Antibody: Not on file        Diabetes Screening 1-2 times per year if you're at risk for diabetes or have pre-diabetes Fasting glucose: 87 mg/dL (10/23/2024)  A1C: 5.4 % (8/11/2020)  Screening Current   Cholesterol Screening Once every 5 years if you don't have a lipid disorder. May order more often based on risk factors. Lipid panel: 04/19/2024    Screening Current     Other Preventive Screenings Covered by Medicare:  Abdominal Aortic Aneurysm (AAA) Screening: covered once if your at risk. You're considered to be at risk if you have a family history of AAA.  Lung Cancer Screening: covers low dose CT scan once per year if you meet all of the following conditions: (1) Age 55-77; (2) No signs or symptoms of lung cancer; (3) Current smoker or have quit smoking within the last 15 years; (4) You have a tobacco smoking history of at least 20 pack years (packs per day multiplied by number of years you smoked); (5) You get a written order from a healthcare provider.  Glaucoma Screening: covered annually if you're considered high risk: (1) You have diabetes OR (2) Family history of glaucoma OR (3)  aged 50 and older OR (4)  American aged 65 and older  Osteoporosis Screening: covered every 2 years if you meet one of the following conditions: (1) You're estrogen deficient and at risk for osteoporosis based off medical history and other findings; (2) Have a vertebral abnormality; (3) On glucocorticoid therapy for more than 3 months; (4) Have primary hyperparathyroidism; (5) On osteoporosis medications and need to assess response to drug therapy.   Last bone density test (DXA Scan): 03/29/2023.  HIV Screening: covered annually if you're between the age of 15-65. Also covered annually if you are younger than 15 and older than 65 with risk factors for HIV infection. For pregnant patients, it is covered up to 3 times per pregnancy.    Immunizations:  Immunization Recommendations    Influenza Vaccine Annual influenza vaccination during flu season is recommended for all persons aged >= 6 months who do not have contraindications   Pneumococcal Vaccine   * Pneumococcal conjugate vaccine = PCV13 (Prevnar 13), PCV15 (Vaxneuvance), PCV20 (Prevnar 20)  * Pneumococcal polysaccharide vaccine = PPSV23 (Pneumovax) Adults 19-63 yo with certain risk factors or if 65+ yo  If never received any pneumonia vaccine: recommend Prevnar 20 (PCV20)  Give PCV20 if previously received 1 dose of PCV13 or PPSV23   Hepatitis B Vaccine 3 dose series if at intermediate or high risk (ex: diabetes, end stage renal disease, liver disease)   Respiratory syncytial virus (RSV) Vaccine - COVERED BY MEDICARE PART D  * RSVPreF3 (Arexvy) CDC recommends that adults 60 years of age and older may receive a single dose of RSV vaccine using shared clinical decision-making (SCDM)   Tetanus (Td) Vaccine - COST NOT COVERED BY MEDICARE PART B Following completion of primary series, a booster dose should be given every 10 years to maintain immunity against tetanus. Td may also be given as tetanus wound prophylaxis.   Tdap Vaccine - COST NOT COVERED BY MEDICARE PART B Recommended at least once for all adults. For pregnant patients, recommended with each pregnancy.   Shingles Vaccine (Shingrix) - COST NOT COVERED BY MEDICARE PART B  2 shot series recommended in those 19 years and older who have or will have weakened immune systems or those 50 years and older     Health Maintenance Due:      Topic Date Due   • Cervical Cancer Screening  01/26/2023   • Breast Cancer Screening: Mammogram  03/13/2025   • DXA SCAN  03/29/2025     Immunizations Due:  There are no preventive care reminders to display for this patient.  Advance Directives   What are advance directives?  Advance directives are legal documents that state your wishes and plans for medical care. These plans are made ahead of time in case you lose your ability to make decisions for  yourself. Advance directives can apply to any medical decision, such as the treatments you want, and if you want to donate organs.   What are the types of advance directives?  There are many types of advance directives, and each state has rules about how to use them. You may choose a combination of any of the following:  Living will:  This is a written record of the treatment you want. You can also choose which treatments you do not want, which to limit, and which to stop at a certain time. This includes surgery, medicine, IV fluid, and tube feedings.   Durable power of  for healthcare (DPAHC):  This is a written record that states who you want to make healthcare choices for you when you are unable to make them for yourself. This person, called a proxy, is usually a family member or a friend. You may choose more than 1 proxy.  Do not resuscitate (DNR) order:  A DNR order is used in case your heart stops beating or you stop breathing. It is a request not to have certain forms of treatment, such as CPR. A DNR order may be included in other types of advance directives.  Medical directive:  This covers the care that you want if you are in a coma, near death, or unable to make decisions for yourself. You can list the treatments you want for each condition. Treatment may include pain medicine, surgery, blood transfusions, dialysis, IV or tube feedings, and a ventilator (breathing machine).  Values history:  This document has questions about your views, beliefs, and how you feel and think about life. This information can help others choose the care that you would choose.  Why are advance directives important?  An advance directive helps you control your care. Although spoken wishes may be used, it is better to have your wishes written down. Spoken wishes can be misunderstood, or not followed. Treatments may be given even if you do not want them. An advance directive may make it easier for your family to make  "difficult choices about your care.   Alcohol Use and Your Health    Drinking too much can harm your health.  Excessive alcohol use leads to about 88,000 death in the [place] each year, and shortens the life of those who diet by almost 30 years.  Further, excessive drinking cost the economy $249 billion in 2010.  Most excessive drinkers are not alcohol dependent.    Excessive alcohol use has immediate effects that increase the risk of many harmful health conditions.  These are most often the result of binge drinking.  Over time, excessive alcohol use can lead to the development of chronic diseases and other series health problems.    What is considered a \"drink\"?        Excessive alcohol use includes:  Binge Drinking: For women, 4 or more drinks consumed on one occasion. For men, 5 or more drinks consumed on one occasion.  Heavy Drinking: For women, 8 or more drinks per week. For men, 15 or more drinks per week  Any alcohol used by pregnant women  Any alcohol used by those under the age of 21 years    If you choose to drink, do so in moderation:  Do not drink at all if you are under the age of 21, or if you are or may be pregnant, or have health problems that could be made worse by drinking.  For women, up to 1 drink per day  For men, up to 2 drinks a day    No one should begin drinking or drink more frequently based on potential health benefits    Short-Term Health Risks:  Injuries: motor vehicle crashes, falls, drownings, burns  Violence: homicide, suicide, sexual assault, intimate partner violence  Alcohol poisoning  Reproductive health: risky sexual behaviors, unintended prengnacy, sexually transmitted diseases, miscarriage, stillbirth, fetal alcohol syndrome    Long-Term Health Risks:  Chronic diseases: high blood pressure, heart disease, stroke, liver disease, digestive problems  Cancers: breast, mouth and throat, liver, colon  Learning and memory problems: dementia, poor school performance  Mental " health: depression, anxiety, insomnia  Social problems: lost productivity, family problems, unemployment  Alcohol dependence    For support and more information:  Substance Abuse and Mental Health Services Administration  [address]  [place] [address]  Web Address: http://www.[facility].gov    Alcoholics Anonymous        Web Address: http://www.aa.org    https://www.cdc.gov/alcohol/fact-sheets/alcohol-use.htm     © Copyright [employer] 2018 Information is for End User's use only and may not be sold, redistributed or otherwise used for commercial purposes. All illustrations and images included in CareNotes® are the copyrighted property of [employer].A[employer]M., Inc[employer] or [employer]

## 2024-11-12 ENCOUNTER — TELEPHONE (OUTPATIENT)
Dept: OBGYN CLINIC | Facility: CLINIC | Age: 84
End: 2024-11-12

## 2024-11-12 NOTE — TELEPHONE ENCOUNTER
Left message stating pt needs to reschedule appt on 2/11 due to Dr. Syed being out of office that day. Left office number to call back and reschedule.

## 2024-12-20 ENCOUNTER — TELEPHONE (OUTPATIENT)
Dept: DERMATOLOGY | Facility: CLINIC | Age: 84
End: 2024-12-20

## 2024-12-20 NOTE — TELEPHONE ENCOUNTER
Called and left VM regarding rescheduling patient's appointment with Dr Grey on 3/6/25. Left message stating I rescheduled her appointment to 3/11/25 at 10:20 with Manuel Jacobson, who will be working in parallel with Dr Grey that day. I advised patient to call our office back if this reschedule does not work for her.

## 2025-02-11 NOTE — PROGRESS NOTES
Assessment        Diagnoses and all orders for this visit:    Encntr for gyn exam (general) (routine) w/o abn findings    Vaginal atrophy  -     estradiol (ESTRACE) 0.1 mg/g vaginal cream; Insert 1 g into the vagina 2 (two) times a week    Cervical cancer screening  -     Liquid-based pap, screening    Encounter for screening mammogram for breast cancer  -     Mammo screening bilateral w 3d and cad; Future    Other orders  -     Acetaminophen (Tylenol) 325 MG CAPS; Take by mouth  -     Naproxen Sodium (ALEVE PO); Take by mouth             Plan      All questions answered.  Await pap smear results.  Educational material distributed.  Follow up in 2 years.  Follow up as needed.  Mammogram.  Pap smear.   Declines DEXA  Previously declined colorectal cancer screening  Continue Estrace cream vaginal      Subjective      Sofia Gurrola is a 84 y.o. female who presents for annual exam.      Chief Complaint   Patient presents with    Gynecologic Exam       Not sexually active  She has no vaginal bleeding    On Estrace 2x/week      Last Pap:   Last mammogram: 2024, scheduled for 3/18/25  Colorectal cancer screening: Cologuard: Not on file Colonoscopy: Not on file   DEXA: 2023 osteoporosis, no treatment, declines repeat    Current contraception: post menopausal status  History of abnormal Pap smear: no  History of abnormal mammogram: no  Family history of uterine or ovarian cancer: no  Family history of breast cancer: no  Family history of colon cancer: no    OB History    Para Term  AB Living   2 2 2 0 0 2   SAB IAB Ectopic Multiple Live Births   0 0 0 0 2      # Outcome Date GA Lbr Eugenio/2nd Weight Sex Type Anes PTL Lv   2 Term      Vag-Spont      1 Term      Vag-Spont          Menstrual History:  OB History          2    Para   2    Term   2       0    AB   0    Living   2         SAB   0    IAB   0    Ectopic   0    Multiple   0    Live Births   2                Menarche age:  15  No LMP recorded. Patient is postmenopausal.       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's due to ruptured apendix    Shoulder joint pain     right- reverse arthroplasty today 2020    Squamous cell skin cancer     Wears glasses     Wears partial dentures     upper     Past Surgical History:   Procedure Laterality Date    APPENDECTOMY      BREAST BIOPSY Right 02/15/2021    stereo, benign    CHOLECYSTECTOMY LAPAROSCOPIC      COLONOSCOPY      EYE SURGERY      HIP SURGERY      JOINT REPLACEMENT Right     hip    LAPAROSCOPIC CHOLECYSTECTOMY      LAPAROSCOPY      MAMMO STEREOTACTIC BREAST BIOPSY RIGHT (ALL INC) Right 02/15/2021    MOHS SURGERY Left 2024    BCC left forehead, Dr Gurrola    TX ARTHROPLASTY GLENOHUMERAL JOINT TOTAL SHOULDER Right 2020    Procedure: ARTHROPLASTY SHOULDER REVERSE;  Surgeon: Red Peoples MD;  Location: [facility];  Service: Orthopedics    SHOULDER SURGERY Right 2022    TONSILLECTOMY      TUBAL LIGATION      WISDOM TOOTH EXTRACTION       Family History   Problem Relation Age of Onset    Hypertension Mother             Coronary artery disease Father     Hypertension Father             Hyperlipidemia Father     No Known Problems Brother     No Known Problems Daughter     No Known Problems Son     No Known Problems Maternal Grandmother     No Known Problems Maternal Grandfather     No Known Problems Paternal Grandmother     No Known Problems Paternal Grandfather     Alcohol abuse Neg Hx     Substance Abuse Neg Hx     Mental illness Neg  "Hx        Social History     Tobacco Use    Smoking status: Former     Current packs/day: 0.00     Average packs/day: 0.3 packs/day for 15.0 years (3.8 ttl pk-yrs)     Types: Cigarettes     Start date: 3/4/1978     Quit date:      Years since quittin.1    Smokeless tobacco: Never   Vaping Use    Vaping status: Never Used   Substance Use Topics    Alcohol use: Yes     Alcohol/week: 4.0 standard drinks of alcohol     Types: 4 Glasses of wine per week    Drug use: Never          Current Outpatient Medications:     Acetaminophen (Tylenol) 325 MG CAPS, Take by mouth, Disp: , Rfl:     amoxicillin (AMOXIL) 500 mg capsule, Prior to dental appts, Disp: , Rfl:     Calcium Carbonate-Vit D-Min (CALCIUM 1200 PO), Take by mouth 2 (two) times a day, Disp: , Rfl:     Cholecalciferol (VITAMIN D) 50 MCG (2000 UT) tablet, Take 2,000 Units by mouth daily, Disp: , Rfl:     [START ON 2025] estradiol (ESTRACE) 0.1 mg/g vaginal cream, Insert 1 g into the vagina 2 (two) times a week, Disp: 42.5 g, Rfl: 3    lisinopril (ZESTRIL) 40 mg tablet, TAKE 1 TABLET BY MOUTH EVERY DAY, Disp: 90 tablet, Rfl: 3    Multiple Vitamins-Minerals (MULTIVITAMIN WOMEN PO), Take 1 tablet by mouth daily , Disp: , Rfl:     Naproxen Sodium (ALEVE PO), Take by mouth, Disp: , Rfl:     No Known Allergies        Review of Systems   Constitutional: Negative.    HENT: Negative.     Eyes: Negative.    Respiratory: Negative.     Cardiovascular: Negative.    Gastrointestinal: Negative.    Endocrine: Negative.    Genitourinary:         As noted in HPI   Musculoskeletal: Negative.    Skin: Negative.    Allergic/Immunologic: Negative.    Neurological: Negative.    Hematological: Negative.    Psychiatric/Behavioral: Negative.         /80 (BP Location: Right arm, Patient Position: Sitting, Cuff Size: Adult)   Ht 5' 3\" (1.6 m)   Wt 52 kg (114 lb 9.6 oz)   BMI 20.30 kg/m²         Physical Exam  Constitutional:       Appearance: She is well-developed. "   Genitourinary:      Vulva, bladder and rectum normal.      No lesions in the vagina.      Genitourinary Comments:         Right Labia: No rash, tenderness, lesions, skin changes or Bartholin's cyst.     Left Labia: No tenderness, lesions, skin changes, Bartholin's cyst or rash.     No inguinal adenopathy present in the right or left side.     No vaginal discharge, tenderness or bleeding.      No vaginal prolapse present.     Moderate vaginal atrophy present.       Right Adnexa: not tender, not full and no mass present.     Left Adnexa: not tender, not full and no mass present.     No cervical motion tenderness, friability, lesion or polyp.      Uterus is not enlarged or tender.      Pelvic exam was performed with patient in the lithotomy position.   Rectum:      No external hemorrhoid.   Breasts:     Right: No mass, nipple discharge, skin change or tenderness.      Left: No mass, nipple discharge, skin change or tenderness.   HENT:      Head: Normocephalic.      Nose: Nose normal.   Eyes:      Conjunctiva/sclera: Conjunctivae normal.   Neck:      Thyroid: No thyromegaly.   Cardiovascular:      Rate and Rhythm: Normal rate and regular rhythm.      Heart sounds: Normal heart sounds. No murmur heard.  Pulmonary:      Effort: Pulmonary effort is normal. No respiratory distress.      Breath sounds: Normal breath sounds. No wheezing or rales.   Abdominal:      General: There is no distension.      Palpations: Abdomen is soft. There is no mass.      Tenderness: There is no abdominal tenderness. There is no guarding or rebound.   Musculoskeletal:         General: No tenderness.      Cervical back: Neck supple. No muscular tenderness.   Lymphadenopathy:      Cervical: No cervical adenopathy.      Lower Body: No right inguinal adenopathy. No left inguinal adenopathy.   Neurological:      Mental Status: She is alert and oriented to person, place, and time.   Skin:     General: Skin is warm and dry.   Psychiatric:          Mood and Affect: Mood normal.         Behavior: Behavior normal.   Vitals and nursing note reviewed. Exam conducted with a chaperone present.             Future Appointments   Date Time Provider Department Center   3/18/2025 10:30 AM [facility]   4/1/2025 10:30 AM [facility]   4/8/2025 10:45 AM Madelyn Graham MD [facility]   8/27/2025  4:20 PM Placido Grey MD [facility]

## 2025-02-12 ENCOUNTER — ANNUAL EXAM (OUTPATIENT)
Dept: OBGYN CLINIC | Facility: CLINIC | Age: 85
End: 2025-02-12
Payer: COMMERCIAL

## 2025-02-12 VITALS
HEIGHT: 63 IN | DIASTOLIC BLOOD PRESSURE: 80 MMHG | WEIGHT: 114.6 LBS | SYSTOLIC BLOOD PRESSURE: 134 MMHG | BODY MASS INDEX: 20.3 KG/M2

## 2025-02-12 DIAGNOSIS — Z12.31 ENCOUNTER FOR SCREENING MAMMOGRAM FOR BREAST CANCER: ICD-10-CM

## 2025-02-12 DIAGNOSIS — Z12.4 CERVICAL CANCER SCREENING: ICD-10-CM

## 2025-02-12 DIAGNOSIS — Z01.419 ENCNTR FOR GYN EXAM (GENERAL) (ROUTINE) W/O ABN FINDINGS: Primary | ICD-10-CM

## 2025-02-12 DIAGNOSIS — N95.2 VAGINAL ATROPHY: ICD-10-CM

## 2025-02-12 PROCEDURE — G0101 CA SCREEN;PELVIC/BREAST EXAM: HCPCS | Performed by: OBSTETRICS & GYNECOLOGY

## 2025-02-12 PROCEDURE — G0145 SCR C/V CYTO,THINLAYER,RESCR: HCPCS | Performed by: OBSTETRICS & GYNECOLOGY

## 2025-02-12 RX ORDER — ESTRADIOL 0.1 MG/G
1 CREAM VAGINAL 2 TIMES WEEKLY
Qty: 42.5 G | Refills: 3 | Status: SHIPPED | OUTPATIENT
Start: 2025-02-13

## 2025-02-17 ENCOUNTER — RESULTS FOLLOW-UP (OUTPATIENT)
Dept: OTHER | Facility: HOSPITAL | Age: 85
End: 2025-02-17

## 2025-02-17 LAB
LAB AP GYN PRIMARY INTERPRETATION: NORMAL
Lab: NORMAL

## 2025-03-18 ENCOUNTER — HOSPITAL ENCOUNTER (OUTPATIENT)
Dept: MAMMOGRAPHY | Facility: IMAGING CENTER | Age: 85
Discharge: HOME | End: 2025-03-18
Payer: COMMERCIAL

## 2025-03-18 VITALS — WEIGHT: 114 LBS | BODY MASS INDEX: 20.2 KG/M2 | HEIGHT: 63 IN

## 2025-03-18 DIAGNOSIS — Z12.31 ENCOUNTER FOR SCREENING MAMMOGRAM FOR MALIGNANT NEOPLASM OF BREAST: ICD-10-CM

## 2025-03-18 PROCEDURE — 77063 BREAST TOMOSYNTHESIS BI: CPT

## 2025-03-18 PROCEDURE — 77067 SCR MAMMO BI INCL CAD: CPT

## 2025-03-24 ENCOUNTER — RESULTS FOLLOW-UP (OUTPATIENT)
Age: 85
End: 2025-03-24

## 2025-03-30 ENCOUNTER — RA CDI HCC (OUTPATIENT)
Dept: OTHER | Facility: HOSPITAL | Age: 85
End: 2025-03-30

## 2025-04-01 ENCOUNTER — HOSPITAL ENCOUNTER (OUTPATIENT)
Dept: ULTRASOUND IMAGING | Facility: HOSPITAL | Age: 85
Discharge: HOME | End: 2025-04-01
Payer: COMMERCIAL

## 2025-04-01 DIAGNOSIS — R31.29 HEMATURIA, MICROSCOPIC: ICD-10-CM

## 2025-04-01 PROCEDURE — 76775 US EXAM ABDO BACK WALL LIM: CPT

## 2025-04-02 ENCOUNTER — TELEPHONE (OUTPATIENT)
Dept: DERMATOLOGY | Facility: CLINIC | Age: 85
End: 2025-04-02

## 2025-04-02 NOTE — TELEPHONE ENCOUNTER
Called and left message for patient to call back and confirm. Rescheduled patient to same date and location. However patient orginally scheduled for Dr. Grey but /Bryan Grey no longer seeing patients in cv. Reschedule to 8/27 with Vivian at 3pm/ Requested patient to call back and confirm if she is ok with this change

## 2025-04-03 NOTE — TELEPHONE ENCOUNTER
Rec'd call from patient highly upset that her appointment was changed again from doctor to doctor will not continue with us and wants to cxl the appointment with Vivian.

## 2025-04-07 ENCOUNTER — APPOINTMENT (OUTPATIENT)
Dept: LAB | Facility: CLINIC | Age: 85
End: 2025-04-07
Payer: COMMERCIAL

## 2025-04-07 DIAGNOSIS — I10 ESSENTIAL HYPERTENSION: ICD-10-CM

## 2025-04-07 DIAGNOSIS — R31.29 HEMATURIA, MICROSCOPIC: ICD-10-CM

## 2025-04-07 DIAGNOSIS — E87.1 HYPONATREMIA: ICD-10-CM

## 2025-04-07 LAB
ALBUMIN SERPL BCG-MCNC: 4.2 G/DL (ref 3.5–5)
ALP SERPL-CCNC: 72 U/L (ref 34–104)
ALT SERPL W P-5'-P-CCNC: 20 U/L (ref 7–52)
ANION GAP SERPL CALCULATED.3IONS-SCNC: 6 MMOL/L (ref 4–13)
AST SERPL W P-5'-P-CCNC: 26 U/L (ref 13–39)
BILIRUB SERPL-MCNC: 0.57 MG/DL (ref 0.2–1)
BUN SERPL-MCNC: 27 MG/DL (ref 5–25)
CALCIUM SERPL-MCNC: 9.5 MG/DL (ref 8.4–10.2)
CHLORIDE SERPL-SCNC: 99 MMOL/L (ref 96–108)
CHOLEST SERPL-MCNC: 168 MG/DL (ref ?–200)
CO2 SERPL-SCNC: 28 MMOL/L (ref 21–32)
CREAT SERPL-MCNC: 0.56 MG/DL (ref 0.6–1.3)
GFR SERPL CREATININE-BSD FRML MDRD: 87 ML/MIN/1.73SQ M
GLUCOSE P FAST SERPL-MCNC: 97 MG/DL (ref 65–99)
HDLC SERPL-MCNC: 79 MG/DL
LDLC SERPL CALC-MCNC: 76 MG/DL (ref 0–100)
POTASSIUM SERPL-SCNC: 4.7 MMOL/L (ref 3.5–5.3)
PROT SERPL-MCNC: 6.2 G/DL (ref 6.4–8.4)
SODIUM SERPL-SCNC: 133 MMOL/L (ref 135–147)
TRIGL SERPL-MCNC: 65 MG/DL (ref ?–150)

## 2025-04-07 PROCEDURE — 36415 COLL VENOUS BLD VENIPUNCTURE: CPT

## 2025-04-07 PROCEDURE — 80061 LIPID PANEL: CPT

## 2025-04-07 PROCEDURE — 80053 COMPREHEN METABOLIC PANEL: CPT

## 2025-04-08 ENCOUNTER — APPOINTMENT (OUTPATIENT)
Dept: LAB | Facility: CLINIC | Age: 85
End: 2025-04-08

## 2025-04-08 DIAGNOSIS — I10 ESSENTIAL HYPERTENSION: ICD-10-CM

## 2025-04-08 DIAGNOSIS — R31.29 HEMATURIA, MICROSCOPIC: ICD-10-CM

## 2025-04-23 ENCOUNTER — OFFICE VISIT (OUTPATIENT)
Dept: FAMILY MEDICINE CLINIC | Facility: CLINIC | Age: 85
End: 2025-04-23
Payer: COMMERCIAL

## 2025-04-23 VITALS
TEMPERATURE: 97.8 F | WEIGHT: 111.8 LBS | SYSTOLIC BLOOD PRESSURE: 132 MMHG | DIASTOLIC BLOOD PRESSURE: 76 MMHG | HEIGHT: 63 IN | HEART RATE: 80 BPM | OXYGEN SATURATION: 99 % | BODY MASS INDEX: 19.81 KG/M2 | RESPIRATION RATE: 15 BRPM

## 2025-04-23 DIAGNOSIS — I10 ESSENTIAL HYPERTENSION: Primary | ICD-10-CM

## 2025-04-23 DIAGNOSIS — R31.29 HEMATURIA, MICROSCOPIC: ICD-10-CM

## 2025-04-23 DIAGNOSIS — M81.0 AGE-RELATED OSTEOPOROSIS WITHOUT CURRENT PATHOLOGICAL FRACTURE: ICD-10-CM

## 2025-04-23 DIAGNOSIS — E87.1 HYPONATREMIA: ICD-10-CM

## 2025-04-23 DIAGNOSIS — Z78.0 POSTMENOPAUSAL ESTROGEN DEFICIENCY: ICD-10-CM

## 2025-04-23 PROCEDURE — G2211 COMPLEX E/M VISIT ADD ON: HCPCS | Performed by: FAMILY MEDICINE

## 2025-04-23 PROCEDURE — 99214 OFFICE O/P EST MOD 30 MIN: CPT | Performed by: FAMILY MEDICINE

## 2025-04-23 RX ORDER — LISINOPRIL 40 MG/1
40 TABLET ORAL DAILY
Qty: 90 TABLET | Refills: 3 | Status: SHIPPED | OUTPATIENT
Start: 2025-04-23

## 2025-04-23 NOTE — ASSESSMENT & PLAN NOTE
- continue calcium and vitamin D supplements and regular weight bearing exercises, repeat DEXA scan ordered  Orders:    DXA bone density spine hip and pelvis; Future

## 2025-04-23 NOTE — PROGRESS NOTES
Name: Sofia Gurrola      : 1940      MRN: [id number]  Encounter Provider: Madelyn Graham MD  Encounter Date: 2025   Encounter department: [facility]  :  Assessment & Plan  Essential hypertension  - /76 at goal, continue lisinopril  Orders:    lisinopril (ZESTRIL) 40 mg tablet; Take 1 tablet (40 mg total) by mouth daily    Comprehensive metabolic panel; Future    CBC and differential; Future    TSH, 3rd generation with Free T4 reflex; Future    Hyponatremia  - sodium 133, stable, will recheck lab work in 6 months  Orders:    Comprehensive metabolic panel; Future    Hematuria, microscopic  - US kidneys 25 unremarkable, will repeat urinalysis    Age-related osteoporosis without current pathological fracture  - continue calcium and vitamin D supplements and regular weight bearing exercises, repeat DEXA scan ordered  Orders:    DXA bone density spine hip and pelvis; Future      Return in 6 months or sooner as needed.  Patient understands and agrees with the treatment plan.        History of Present Illness   Patient presents today for follow up hypertension, osteoporosis, hyponatremia and review her lab results. Patient has no concerns at this time, she is taking her lisinopril as prescribed and reports good home BP readings in 120's-130's/70's.       Review of Systems   Constitutional:  Negative for appetite change, chills, fatigue, fever and unexpected weight change.   Respiratory:  Negative for cough, shortness of breath and wheezing.    Cardiovascular:  Negative for chest pain, palpitations and leg swelling.   Gastrointestinal:  Negative for abdominal pain, blood in stool, constipation, diarrhea, nausea and vomiting.   Genitourinary:  Negative for dysuria, flank pain, frequency, pelvic pain and urgency.   Musculoskeletal:  Negative for neck pain.   Neurological:  Negative for dizziness, syncope, weakness, numbness and headaches.   Hematological:   "Negative for adenopathy.   Psychiatric/Behavioral:  Negative for dysphoric mood and sleep disturbance. The patient is not nervous/anxious.        Objective   /82   Pulse 80   Temp 97.8 °F (36.6 °C)   Resp 15   Ht 5' 3\" (1.6 m)   Wt 50.7 kg (111 lb 12.8 oz)   SpO2 99%   BMI 19.80 kg/m²      Physical Exam  Constitutional:       General: She is not in acute distress.  Cardiovascular:      Rate and Rhythm: Normal rate and regular rhythm.   Pulmonary:      Effort: Pulmonary effort is normal. No respiratory distress.      Breath sounds: Normal breath sounds. No wheezing, rhonchi or rales.   Abdominal:      General: There is no distension.      Palpations: Abdomen is soft. There is no mass.      Tenderness: There is no abdominal tenderness.   Musculoskeletal:      Cervical back: Neck supple.      Right lower leg: No edema.      Left lower leg: No edema.   Lymphadenopathy:      Cervical: No cervical adenopathy.   Neurological:      Mental Status: She is alert and oriented to person, place, and time.   Psychiatric:         Mood and Affect: Mood normal.         Behavior: Behavior normal.       Lab Results   Component Value Date    CHOLESTEROL 168 04/07/2025    CHOLESTEROL 180 04/19/2024    CHOLESTEROL 150 09/28/2022     Lab Results   Component Value Date    HDL 79 04/07/2025    HDL 87 04/19/2024    HDL 77 09/28/2022     Lab Results   Component Value Date    TRIG 65 04/07/2025    TRIG 78 04/19/2024    TRIG 52 09/28/2022     Lab Results   Component Value Date    LDLCALC 76 04/07/2025     Lab Results   Component Value Date    SODIUM 133 (L) 04/07/2025    K 4.7 04/07/2025    CL 99 04/07/2025    CO2 28 04/07/2025    AGAP 6 04/07/2025    BUN 27 (H) 04/07/2025    CREATININE 0.56 (L) 04/07/2025    GLUC 101 09/03/2022    GLUF 97 04/07/2025    CALCIUM 9.5 04/07/2025    AST 26 04/07/2025    ALT 20 04/07/2025    ALKPHOS 72 04/07/2025    TP 6.2 (L) 04/07/2025    TBILI 0.57 04/07/2025    EGFR 87 04/07/2025       "

## 2025-04-30 ENCOUNTER — APPOINTMENT (OUTPATIENT)
Dept: LAB | Facility: CLINIC | Age: 85
End: 2025-04-30
Payer: COMMERCIAL

## 2025-04-30 LAB
BACTERIA UR QL AUTO: ABNORMAL /HPF
BILIRUB UR QL STRIP: NEGATIVE
CLARITY UR: CLEAR
COLOR UR: YELLOW
GLUCOSE UR STRIP-MCNC: NEGATIVE MG/DL
HGB UR QL STRIP.AUTO: ABNORMAL
KETONES UR STRIP-MCNC: NEGATIVE MG/DL
LEUKOCYTE ESTERASE UR QL STRIP: NEGATIVE
MUCOUS THREADS UR QL AUTO: ABNORMAL
NITRITE UR QL STRIP: NEGATIVE
NON-SQ EPI CELLS URNS QL MICRO: ABNORMAL /HPF
PH UR STRIP.AUTO: 7 [PH]
PROT UR STRIP-MCNC: NEGATIVE MG/DL
RBC #/AREA URNS AUTO: ABNORMAL /HPF
SP GR UR STRIP.AUTO: 1.02 (ref 1–1.03)
UROBILINOGEN UR STRIP-ACNC: <2 MG/DL
WBC #/AREA URNS AUTO: ABNORMAL /HPF

## 2025-05-02 ENCOUNTER — RESULTS FOLLOW-UP (OUTPATIENT)
Dept: FAMILY MEDICINE CLINIC | Facility: CLINIC | Age: 85
End: 2025-05-02

## 2025-08-06 ENCOUNTER — TELEPHONE (OUTPATIENT)
Age: 85
End: 2025-08-06

## 2025-08-11 ENCOUNTER — TELEPHONE (OUTPATIENT)
Age: 85
End: 2025-08-11

## (undated) DEVICE — INTENDED FOR TISSUE SEPARATION, AND OTHER PROCEDURES THAT REQUIRE A SHARP SURGICAL BLADE TO PUNCTURE OR CUT.: Brand: BARD-PARKER ® CARBON RIB-BACK BLADES

## (undated) DEVICE — TIP SUCTION FRAZIER 12FR

## (undated) DEVICE — 3M™ MICROFOAM™ TAPE 1528-4: Brand: 3M™ MICROFOAM™

## (undated) DEVICE — GLOVE 8 PROTEXIS PI MICRO

## (undated) DEVICE — ***USE 124736***SYRINGE TOOMEY

## (undated) DEVICE — DRESSING SPONGE ALL GAUZE 4X4 STER 10PK

## (undated) DEVICE — POSITIONER OSI BEACH CHAIR FOAM

## (undated) DEVICE — NEEDLE HALF CIRCLE TAPER MED GALLES FASCIA

## (undated) DEVICE — COVER LIGHTHANDLE

## (undated) DEVICE — HOOD T7 PLUS

## (undated) DEVICE — PACK MLHS SHOULDER PACK RFID STDZ

## (undated) DEVICE — DRAPE POUCH IRRIGATION

## (undated) DEVICE — MIX-EVAC HIGH VACUUM KIT

## (undated) DEVICE — GLOVE SZ 8 LINER PROTEXIS PI BL

## (undated) DEVICE — SUT VICRYL 2-0 CT-2 27 IN J269H

## (undated) DEVICE — CAPIT SHOULDER REVERSE TOTAL

## (undated) DEVICE — TIP SUCTION YANKAUER

## (undated) DEVICE — SUTURE ETHIBOND 5 MB47G CCS 4PK

## (undated) DEVICE — BLADE SAGITTAL #376 HEAVY WIDE

## (undated) DEVICE — ADHESIVE SKIN DERMABOND ADVANCED 0.7ML

## (undated) DEVICE — GLOVE SRG BIOGEL 6.5

## (undated) DEVICE — SINGLE PORT MANIFOLD: Brand: NEPTUNE 2

## (undated) DEVICE — SURGICAL GOWN, XL SMARTSLEEVE: Brand: CONVERTORS

## (undated) DEVICE — SOLN IRRIG .9%SOD 3L

## (undated) DEVICE — 3M™ IOBAN™ 2 ANTIMICROBIAL INCISE DRAPE 6648EZ: Brand: IOBAN™ 2

## (undated) DEVICE — DRILL BIT 4MM STRIPED

## (undated) DEVICE — SURGICEL 2X14

## (undated) DEVICE — CHEST ROLL FOAM POSITIONER: Brand: CARDINAL HEALTH

## (undated) DEVICE — ***USE 57698*** SLEEVE FLOWTRON DVT CALF SINGLE USE

## (undated) DEVICE — SUT MONOCRYL 3-0 PS-2 27 IN Y427H

## (undated) DEVICE — DRILL TAP 6.5MM

## (undated) DEVICE — GOWN SURGICAL REINFORCED X-LAR

## (undated) DEVICE — DRAPE TOWEL 17X23 NON-STERILE

## (undated) DEVICE — NEEDLE SPINAL 18G X3-1-2IN

## (undated) DEVICE — SUT FIBERWIRE #2 1/2 CIRCLE T-5 38IN AR-7200

## (undated) DEVICE — FACEMASK FOR SHOULDER POSITONER

## (undated) DEVICE — SUT ETHIBOND 5 V-37 30 IN MB66G

## (undated) DEVICE — DRESSING ABD STER LGE 8X10

## (undated) DEVICE — GAUZE 8X4 16 PLY RFID DOUBLE XRAY

## (undated) DEVICE — SCD SEQUENTIAL COMPRESSION COMFORT SLEEVE MEDIUM KNEE LENGTH: Brand: KENDALL SCD

## (undated) DEVICE — DRAPE STERI TOWEL PLASTIC 18X24

## (undated) DEVICE — FRAZIER SUCTION INSTRUMENT 18 FR W/OBTURATOR, NO CONTROL VENT: Brand: FRAZIER

## (undated) DEVICE — PREP SURGICAL PURPREP 26ML

## (undated) DEVICE — MAT ABSORBANT SUPERMAT 50 X 56

## (undated) DEVICE — GLOVE INDICATOR PI UNDERGLOVE SZ 8 BLUE

## (undated) DEVICE — DRESSING TEGADERM 4X4 3/4

## (undated) DEVICE — SOLN IRRIG STER WATER 1000ML

## (undated) DEVICE — ***USE 143206***SYRINGE BULB

## (undated) DEVICE — BLADE SAGITTAL 63.0 X 19.5MM

## (undated) DEVICE — SET HANDPIECE INTERPULSE

## (undated) DEVICE — 3M™ STERI-STRIP™ REINFORCED ADHESIVE SKIN CLOSURES, R1547, 1/2 IN X 4 IN (12 MM X 100 MM), 6 STRIPS/ENVELOPE: Brand: 3M™ STERI-STRIP™

## (undated) DEVICE — COBAN 6 IN STERILE

## (undated) DEVICE — THE SIMPULSE SOLO SYSTEM WITH ULTREX RETRACTABLE SPLASH SHIELD TIP: Brand: SIMPULSE SOLO

## (undated) DEVICE — DRAPE IOBAN

## (undated) DEVICE — BLANKET UPPER BODY BAIR HUGGER

## (undated) DEVICE — BLADE SCALPEL #15

## (undated) DEVICE — BETHLEHEM TOTAL HIP, KIT: Brand: CARDINAL HEALTH

## (undated) DEVICE — GLOVE INDICATOR PI UNDERGLOVE SZ 7 BLUE

## (undated) DEVICE — APPLICATOR CHLORAPREP 26ML ORANGE TINT

## (undated) DEVICE — DRAPE 3/4 REINFORCED

## (undated) DEVICE — VEST STERILE

## (undated) DEVICE — GLOVE SRG BIOGEL 8

## (undated) DEVICE — SUTURE VICRYL PLUS 2-0 VCP869H

## (undated) DEVICE — Device

## (undated) DEVICE — MANIFOLD FOUR PORT NEPTUNE

## (undated) DEVICE — TUBING SMOKE EVAC PENCIL COATED

## (undated) DEVICE — PLUMEPEN PRO 10FT

## (undated) DEVICE — OCCLUSIVE GAUZE STRIP,3% BISMUTH TRIBROMOPHENATE IN PETROLATUM BLEND: Brand: XEROFORM

## (undated) DEVICE — SUTURETAPE 1.3MM SUTURE W NEEDLE

## (undated) DEVICE — SPONGE LAP 18X18 SAFE-T RFID ENHANCED XRAY

## (undated) DEVICE — GUIDE WIRE

## (undated) DEVICE — CHLORAPREP HI-LITE 26ML ORANGE

## (undated) DEVICE — IMPERVIOUS STOCKINETTE: Brand: DEROYAL

## (undated) DEVICE — DRAPE LARGE REVERSE FOLD

## (undated) DEVICE — SUTURE MONOCRYL 3-0  Y497G

## (undated) DEVICE — DRAPE-U-1015

## (undated) DEVICE — PAD GROUND ELECTROSURGICAL W/CORD